# Patient Record
Sex: FEMALE | Race: WHITE | NOT HISPANIC OR LATINO | Employment: FULL TIME | URBAN - METROPOLITAN AREA
[De-identification: names, ages, dates, MRNs, and addresses within clinical notes are randomized per-mention and may not be internally consistent; named-entity substitution may affect disease eponyms.]

---

## 2020-01-23 ENCOUNTER — HOSPITAL ENCOUNTER (OUTPATIENT)
Dept: RADIOLOGY | Facility: HOSPITAL | Age: 30
Discharge: HOME | End: 2020-01-23
Attending: NURSE PRACTITIONER
Payer: MEDICAID

## 2020-01-23 DIAGNOSIS — N64.4 BREAST PAIN: ICD-10-CM

## 2020-01-23 PROCEDURE — 76642 ULTRASOUND BREAST LIMITED: CPT | Mod: 26,LT,, | Performed by: RADIOLOGY

## 2020-01-23 PROCEDURE — 76642 US BREAST LEFT LIMITED: ICD-10-PCS | Mod: 26,LT,, | Performed by: RADIOLOGY

## 2020-01-23 PROCEDURE — 76642 ULTRASOUND BREAST LIMITED: CPT | Mod: TC,LT

## 2021-02-03 ENCOUNTER — LAB VISIT (OUTPATIENT)
Dept: LAB | Facility: HOSPITAL | Age: 31
End: 2021-02-03
Attending: ADVANCED PRACTICE MIDWIFE
Payer: MEDICAID

## 2021-02-03 ENCOUNTER — TELEPHONE (OUTPATIENT)
Dept: OBSTETRICS AND GYNECOLOGY | Facility: CLINIC | Age: 31
End: 2021-02-03

## 2021-02-03 ENCOUNTER — OFFICE VISIT (OUTPATIENT)
Dept: OBSTETRICS AND GYNECOLOGY | Facility: CLINIC | Age: 31
End: 2021-02-03
Payer: MEDICAID

## 2021-02-03 ENCOUNTER — PROCEDURE VISIT (OUTPATIENT)
Dept: OBSTETRICS AND GYNECOLOGY | Facility: CLINIC | Age: 31
End: 2021-02-03
Payer: MEDICAID

## 2021-02-03 VITALS
BODY MASS INDEX: 20.3 KG/M2 | WEIGHT: 107.5 LBS | DIASTOLIC BLOOD PRESSURE: 83 MMHG | SYSTOLIC BLOOD PRESSURE: 128 MMHG | HEIGHT: 61 IN

## 2021-02-03 DIAGNOSIS — Z32.01 PREGNANCY EXAMINATION OR TEST, POSITIVE RESULT: Primary | ICD-10-CM

## 2021-02-03 DIAGNOSIS — O36.80X0 ENCOUNTER TO DETERMINE FETAL VIABILITY OF PREGNANCY, SINGLE OR UNSPECIFIED FETUS: ICD-10-CM

## 2021-02-03 DIAGNOSIS — Z32.01 PREGNANCY EXAMINATION OR TEST, POSITIVE RESULT: ICD-10-CM

## 2021-02-03 PROCEDURE — 80307 DRUG TEST PRSMV CHEM ANLYZR: CPT

## 2021-02-03 PROCEDURE — 99204 PR OFFICE/OUTPT VISIT, NEW, LEVL IV, 45-59 MIN: ICD-10-PCS | Mod: S$PBB,TH,, | Performed by: ADVANCED PRACTICE MIDWIFE

## 2021-02-03 PROCEDURE — 99999 PR PBB SHADOW E&M-EST. PATIENT-LVL II: CPT | Mod: PBBFAC,,, | Performed by: ADVANCED PRACTICE MIDWIFE

## 2021-02-03 PROCEDURE — 99204 OFFICE O/P NEW MOD 45 MIN: CPT | Mod: S$PBB,TH,, | Performed by: ADVANCED PRACTICE MIDWIFE

## 2021-02-03 PROCEDURE — 87086 URINE CULTURE/COLONY COUNT: CPT

## 2021-02-03 PROCEDURE — 87591 N.GONORRHOEAE DNA AMP PROB: CPT

## 2021-02-03 PROCEDURE — 76801 PR US, OB <14WKS, TRANSABD, SINGLE GESTATION: ICD-10-PCS | Mod: 26,S$PBB,, | Performed by: OBSTETRICS & GYNECOLOGY

## 2021-02-03 PROCEDURE — 87491 CHLMYD TRACH DNA AMP PROBE: CPT

## 2021-02-03 PROCEDURE — 76801 OB US < 14 WKS SINGLE FETUS: CPT | Mod: PBBFAC,PN | Performed by: OBSTETRICS & GYNECOLOGY

## 2021-02-03 PROCEDURE — 99212 OFFICE O/P EST SF 10 MIN: CPT | Mod: PBBFAC,25,TH,PN | Performed by: ADVANCED PRACTICE MIDWIFE

## 2021-02-03 PROCEDURE — 76801 OB US < 14 WKS SINGLE FETUS: CPT | Mod: 26,S$PBB,, | Performed by: OBSTETRICS & GYNECOLOGY

## 2021-02-03 PROCEDURE — 99999 PR PBB SHADOW E&M-EST. PATIENT-LVL II: ICD-10-PCS | Mod: PBBFAC,,, | Performed by: ADVANCED PRACTICE MIDWIFE

## 2021-02-04 ENCOUNTER — TELEPHONE (OUTPATIENT)
Dept: OBSTETRICS AND GYNECOLOGY | Facility: CLINIC | Age: 31
End: 2021-02-04

## 2021-02-04 DIAGNOSIS — Z32.01 PREGNANCY EXAMINATION OR TEST, POSITIVE RESULT: Primary | ICD-10-CM

## 2021-02-04 LAB
AMPHET+METHAMPHET UR QL: NORMAL
BARBITURATES UR QL SCN>200 NG/ML: NEGATIVE
BENZODIAZ UR QL SCN>200 NG/ML: NEGATIVE
BZE UR QL SCN: NORMAL
CANNABINOIDS UR QL SCN: NEGATIVE
CREAT UR-MCNC: 112 MG/DL (ref 15–325)
METHADONE UR QL SCN>300 NG/ML: NEGATIVE
OPIATES UR QL SCN: NORMAL
PCP UR QL SCN>25 NG/ML: NEGATIVE
TOXICOLOGY INFORMATION: NORMAL

## 2021-02-05 LAB
BACTERIA UR CULT: NORMAL
C TRACH DNA SPEC QL NAA+PROBE: NOT DETECTED
N GONORRHOEA DNA SPEC QL NAA+PROBE: NOT DETECTED

## 2021-02-11 PROBLEM — O36.80X0 ENCOUNTER TO DETERMINE FETAL VIABILITY OF PREGNANCY: Status: ACTIVE | Noted: 2021-02-11

## 2022-02-10 ENCOUNTER — TELEPHONE (OUTPATIENT)
Dept: OBSTETRICS AND GYNECOLOGY | Facility: CLINIC | Age: 32
End: 2022-02-10
Payer: MEDICAID

## 2022-02-10 NOTE — TELEPHONE ENCOUNTER
----- Message from Saskia Lazaro sent at 2/10/2022  1:49 PM CST -----  Contact: Nomra  Type:  Sooner Apoointment Request    Caller is requesting a sooner appointment.  Caller declined first available appointment listed below.  Caller will not accept being placed on the waitlist and is requesting a message be sent to doctor.  Name of Caller: Norma  When is the first available appointment?4/2022  Symptoms:WWE, Pap, Hormone consult for fertility  Would the patient rather a call back or a response via [facility]? Call back  Best Call Back Number: [phone]  Additional Information: Soonest in [place]

## 2022-02-10 NOTE — TELEPHONE ENCOUNTER
Contacted pt to schedule an appointment. Pt confirmed appointment date/time/location and voiced understanding.

## 2022-02-23 ENCOUNTER — LAB VISIT (OUTPATIENT)
Dept: LAB | Facility: HOSPITAL | Age: 32
End: 2022-02-23
Attending: OBSTETRICS & GYNECOLOGY
Payer: MEDICAID

## 2022-02-23 ENCOUNTER — OFFICE VISIT (OUTPATIENT)
Dept: OBSTETRICS AND GYNECOLOGY | Facility: CLINIC | Age: 32
End: 2022-02-23
Payer: MEDICAID

## 2022-02-23 VITALS — WEIGHT: 104.25 LBS | BODY MASS INDEX: 19.7 KG/M2

## 2022-02-23 DIAGNOSIS — Z11.3 SCREEN FOR STD (SEXUALLY TRANSMITTED DISEASE): ICD-10-CM

## 2022-02-23 DIAGNOSIS — Z12.4 PAP SMEAR FOR CERVICAL CANCER SCREENING: Primary | ICD-10-CM

## 2022-02-23 DIAGNOSIS — Z01.419 ENCOUNTER FOR GYNECOLOGICAL EXAMINATION WITHOUT ABNORMAL FINDING: ICD-10-CM

## 2022-02-23 PROBLEM — Z32.01 PREGNANCY EXAMINATION OR TEST, POSITIVE RESULT: Status: RESOLVED | Noted: 2021-02-03 | Resolved: 2022-02-23

## 2022-02-23 PROBLEM — O36.80X0 ENCOUNTER TO DETERMINE FETAL VIABILITY OF PREGNANCY: Status: RESOLVED | Noted: 2021-02-11 | Resolved: 2022-02-23

## 2022-02-23 PROCEDURE — 87491 CHLMYD TRACH DNA AMP PROBE: CPT | Performed by: OBSTETRICS & GYNECOLOGY

## 2022-02-23 PROCEDURE — 87624 HPV HI-RISK TYP POOLED RSLT: CPT | Performed by: OBSTETRICS & GYNECOLOGY

## 2022-02-23 PROCEDURE — 88141 PR  CYTOPATH CERV/VAG INTERPRET: ICD-10-PCS | Mod: ,,, | Performed by: PATHOLOGY

## 2022-02-23 PROCEDURE — 1159F PR MEDICATION LIST DOCUMENTED IN MEDICAL RECORD: ICD-10-PCS | Mod: CPTII,,, | Performed by: OBSTETRICS & GYNECOLOGY

## 2022-02-23 PROCEDURE — 99999 PR PBB SHADOW E&M-EST. PATIENT-LVL II: CPT | Mod: PBBFAC,,, | Performed by: OBSTETRICS & GYNECOLOGY

## 2022-02-23 PROCEDURE — 86592 SYPHILIS TEST NON-TREP QUAL: CPT | Performed by: OBSTETRICS & GYNECOLOGY

## 2022-02-23 PROCEDURE — 80074 ACUTE HEPATITIS PANEL: CPT | Performed by: OBSTETRICS & GYNECOLOGY

## 2022-02-23 PROCEDURE — 88141 CYTOPATH C/V INTERPRET: CPT | Mod: ,,, | Performed by: PATHOLOGY

## 2022-02-23 PROCEDURE — 99212 OFFICE O/P EST SF 10 MIN: CPT | Mod: PBBFAC | Performed by: OBSTETRICS & GYNECOLOGY

## 2022-02-23 PROCEDURE — 99395 PREV VISIT EST AGE 18-39: CPT | Mod: S$PBB,,, | Performed by: OBSTETRICS & GYNECOLOGY

## 2022-02-23 PROCEDURE — 87389 HIV-1 AG W/HIV-1&-2 AB AG IA: CPT | Performed by: OBSTETRICS & GYNECOLOGY

## 2022-02-23 PROCEDURE — 88142 CYTOPATH C/V THIN LAYER: CPT | Performed by: PATHOLOGY

## 2022-02-23 PROCEDURE — 99395 PR PREVENTIVE VISIT,EST,18-39: ICD-10-PCS | Mod: S$PBB,,, | Performed by: OBSTETRICS & GYNECOLOGY

## 2022-02-23 PROCEDURE — 99999 PR PBB SHADOW E&M-EST. PATIENT-LVL II: ICD-10-PCS | Mod: PBBFAC,,, | Performed by: OBSTETRICS & GYNECOLOGY

## 2022-02-23 PROCEDURE — 3008F BODY MASS INDEX DOCD: CPT | Mod: CPTII,,, | Performed by: OBSTETRICS & GYNECOLOGY

## 2022-02-23 PROCEDURE — 87591 N.GONORRHOEAE DNA AMP PROB: CPT | Performed by: OBSTETRICS & GYNECOLOGY

## 2022-02-23 PROCEDURE — 1159F MED LIST DOCD IN RCRD: CPT | Mod: CPTII,,, | Performed by: OBSTETRICS & GYNECOLOGY

## 2022-02-23 PROCEDURE — 3008F PR BODY MASS INDEX (BMI) DOCUMENTED: ICD-10-PCS | Mod: CPTII,,, | Performed by: OBSTETRICS & GYNECOLOGY

## 2022-02-23 PROCEDURE — 36415 COLL VENOUS BLD VENIPUNCTURE: CPT | Performed by: OBSTETRICS & GYNECOLOGY

## 2022-02-23 RX ORDER — ARIPIPRAZOLE 2 MG/1
TABLET ORAL EVERY MORNING
COMMUNITY
Start: 2022-01-03 | End: 2023-01-11

## 2022-02-23 RX ORDER — BUPRENORPHINE HYDROCHLORIDE, NALOXONE HYDROCHLORIDE 8; 2 MG/1; MG/1
FILM, SOLUBLE BUCCAL; SUBLINGUAL 2 TIMES DAILY
COMMUNITY
Start: 2021-11-15 | End: 2022-03-30

## 2022-02-23 RX ORDER — QUETIAPINE FUMARATE 50 MG/1
TABLET, FILM COATED ORAL NIGHTLY
COMMUNITY
Start: 2022-01-03 | End: 2023-01-11

## 2022-02-23 RX ORDER — QUETIAPINE 150 MG/1
150 TABLET, FILM COATED, EXTENDED RELEASE ORAL NIGHTLY
COMMUNITY
Start: 2021-09-08 | End: 2022-02-23

## 2022-02-23 NOTE — PROGRESS NOTES
Subjective:       Patient ID: Norma De La Garza is a 31 y.o. female.    Chief Complaint:  Gynecologic Exam      History of Present Illness  HPI  The patient presents for exam with no complaints, regular menses, no gyn issues  Contraceptive measures are addressed.   Not interested in BC at this time   Reviewed OB and medical history with patient   Significant history of heart disease, Hep C with activity unknown, previous Hep B, Addiction history, Has ABO antibody titer positive   On multiple medications for mood, Positive Cig smoker   Advised that clearance of medical issues and cease smoking should be done prior to any pregnancy   Preventive testing reviewed and discussed.  Pap needed  STD screening       Health Maintenance   Topic Date Due    Hepatitis C Screening  Never done    Lipid Panel  Never done    TETANUS VACCINE  Never done     GYN & OB History  Patient's last menstrual period was 2022 (approximate).   Date of Last Pap: 2022    OB History    Para Term  AB Living   12 2 2   10 3   SAB IAB Ectopic Multiple Live Births   10     1 3      # Outcome Date GA Lbr Husyein/2nd Weight Sex Delivery Anes PTL Lv   12 SAB            11 SAB            10 SAB            9 SAB            8 SAB            7 SAB            6 SAB            5 SAB            4 SAB            3 SAB            2 Term     F CS-LTranv   ELTON   1A Term     F CS-LTranv   ELTON   1B Term     M CS-LTranv  N ELTON       Review of Systems  Review of Systems        Objective:   Wt 47.3 kg (104 lb 4.4 oz)   LMP 2022 (Approximate)   BMI 19.70 kg/m²    Physical Exam:   Constitutional: She appears well-developed and well-nourished. No distress.      Neck: No JVD present. No thyroid mass and no thyromegaly present.    Cardiovascular: Normal rate and regular rhythm.          Abdominal: Soft. Bowel sounds are normal. No hernia. Hernia confirmed negative in the ventral area, confirmed negative in the right inguinal area and  confirmed negative in the left inguinal area.     Genitourinary:    Vagina, uterus and rectum normal.   Labial bartholins normal.There is no rash, tenderness, lesion or injury on the right labia. There is no rash, tenderness, lesion or injury on the left labia. Cervix is normal. Right adnexum displays no mass, no tenderness and no fullness. Left adnexum displays no mass, no tenderness and no fullness. No erythema,  no vaginal discharge, tenderness or bleeding in the vagina.    No foreign body in the vagina.   Cervix exhibits no motion tenderness, no discharge and no friability. Additional cervical findings: pap smear abnormal Uterus is not deviated, not enlarged, not fixed and not tender.                 Cervical DNA screen done      Assessment:        1. Pap smear for cervical cancer screening    2. Screen for STD (sexually transmitted disease)    3. Encounter for gynecological examination without abnormal finding                Plan:            Norma was seen today for gynecologic exam.    Diagnoses and all orders for this visit:    Pap smear for cervical cancer screening  -     Liquid-Based Pap Smear, Screening  -     HPV High Risk Genotypes, PCR    Screen for STD (sexually transmitted disease)  -     C. trachomatis/N. gonorrhoeae by AMP DNA  -     HIV 1/2 Ag/Ab (4th Gen); Future  -     RPR; Future  -     Hepatitis Panel, Acute; Future    Encounter for gynecological examination without abnormal finding      Discussed Hep C treatment at [facility] and state of [place] if positive

## 2022-02-24 LAB — RPR SER QL: NORMAL

## 2022-02-25 LAB
C TRACH DNA SPEC QL NAA+PROBE: NOT DETECTED
HAV IGM SERPL QL IA: NEGATIVE
HBV CORE IGM SERPL QL IA: NEGATIVE
HBV SURFACE AG SERPL QL IA: NEGATIVE
HCV AB SERPL QL IA: POSITIVE
HIV 1+2 AB+HIV1 P24 AG SERPL QL IA: NEGATIVE
N GONORRHOEA DNA SPEC QL NAA+PROBE: NOT DETECTED

## 2022-02-27 PROBLEM — B19.20 HEPATITIS C VIRUS INFECTION WITHOUT HEPATIC COMA: Status: ACTIVE | Noted: 2022-02-27

## 2022-03-01 ENCOUNTER — TELEPHONE (OUTPATIENT)
Dept: OBSTETRICS AND GYNECOLOGY | Facility: CLINIC | Age: 32
End: 2022-03-01
Payer: MEDICAID

## 2022-03-01 LAB
HPV HR 12 DNA SPEC QL NAA+PROBE: NEGATIVE
HPV16 AG SPEC QL: POSITIVE
HPV18 DNA SPEC QL NAA+PROBE: NEGATIVE

## 2022-03-01 NOTE — TELEPHONE ENCOUNTER
DESTINY Mariee MD   2/28/2022  9:14 AM CST         Inform patient that her Hepatitis C antibody remains positive   She really needs to move forward with [facility] and the state for treatment      Called patient and after verifying with 2 identifiers the above results discussed.  Patient verbalized understanding.

## 2022-03-01 NOTE — TELEPHONE ENCOUNTER
----- Message from Judith Pierre sent at 3/1/2022  3:52 PM CST -----  Contact: pt  Type:  Patient Returning Call    Who Called: pt  Who Left Message for Patient: unknown   Does the patient know what this is regarding?: no  Would the patient rather a call back or a response via [facility]? Call back  Best Call Back Number: [phone]  Additional Information: n/a

## 2022-03-07 LAB
FINAL PATHOLOGIC DIAGNOSIS: ABNORMAL
Lab: ABNORMAL

## 2022-03-15 ENCOUNTER — TELEPHONE (OUTPATIENT)
Dept: OBSTETRICS AND GYNECOLOGY | Facility: CLINIC | Age: 32
End: 2022-03-15
Payer: MEDICAID

## 2022-03-15 NOTE — TELEPHONE ENCOUNTER
Called pt to notify that Dr. Mariee will not be in clinic on the day of her appt and to help reschedule. No answer. LVM.

## 2022-03-30 ENCOUNTER — PROCEDURE VISIT (OUTPATIENT)
Dept: OBSTETRICS AND GYNECOLOGY | Facility: CLINIC | Age: 32
End: 2022-03-30
Payer: MEDICAID

## 2022-03-30 VITALS
SYSTOLIC BLOOD PRESSURE: 120 MMHG | WEIGHT: 109.81 LBS | BODY MASS INDEX: 20.74 KG/M2 | DIASTOLIC BLOOD PRESSURE: 60 MMHG

## 2022-03-30 DIAGNOSIS — R87.611 ATYPICAL SQUAMOUS CELLS CANNOT EXCLUDE HIGH GRADE SQUAMOUS INTRAEPITHELIAL LESION ON CYTOLOGIC SMEAR OF CERVIX (ASC-H): Primary | ICD-10-CM

## 2022-03-30 PROCEDURE — 57454 BX/CURETT OF CERVIX W/SCOPE: CPT | Mod: S$PBB,,, | Performed by: NURSE PRACTITIONER

## 2022-03-30 PROCEDURE — 88305 TISSUE EXAM BY PATHOLOGIST: CPT | Mod: 59 | Performed by: PATHOLOGY

## 2022-03-30 PROCEDURE — 81025 URINE PREGNANCY TEST: CPT | Mod: PBBFAC | Performed by: NURSE PRACTITIONER

## 2022-03-30 PROCEDURE — 57454 BX/CURETT OF CERVIX W/SCOPE: CPT | Mod: PBBFAC | Performed by: NURSE PRACTITIONER

## 2022-03-30 PROCEDURE — 57454 COLPOSCOPY: ICD-10-PCS | Mod: S$PBB,,, | Performed by: NURSE PRACTITIONER

## 2022-03-30 RX ORDER — IBUPROFEN 200 MG
1 TABLET ORAL DAILY
COMMUNITY
Start: 2022-03-08 | End: 2023-01-11

## 2022-03-30 NOTE — LETTER
March 30, 2022    Norma De La Garza  [address] [place] [address]         [facility]  [address] [facility] [facility] [address]  Phone: [phone]  Fax: [phone] March 30, 2022     Patient: Norma De La Garza   YOB: 1990   Date of Visit: 3/30/2022       To Whom It May Concern:    It is my medical opinion that Norma De La Garza be put on pelvic rest for the next 7 days. No intercourse for the next 7 days as well as nothing inside of the vagina including a tampon.  If you have any questions or concerns, please don't hesitate to call.    Sincerely,        Swathi Kaur NP/Shruti Tse

## 2022-03-31 PROCEDURE — 88305 TISSUE EXAM BY PATHOLOGIST: CPT | Mod: 26,,, | Performed by: PATHOLOGY

## 2022-03-31 PROCEDURE — 88305 TISSUE EXAM BY PATHOLOGIST: ICD-10-PCS | Mod: 26,,, | Performed by: PATHOLOGY

## 2022-03-31 NOTE — PROCEDURES
Colposcopy    Date/Time: 3/30/2022 3:15 PM  Performed by: Swathi Kaur NP  Authorized by: Swathi Kaur NP     Consent Done?:  Yes (Verbal) and Yes (Written)  Prep:Patient was prepped and draped in the usual sterile fashion  Assistants?: Yes    List of assistants:  Shruti nation I was present for the entire procedure.    Colposcopy Site:  Cervix  Position:  Supine   Patient was prepped and draped in the normal sterile fashion.  Acrowhite Lesion? Yes    Atypical Vessels: No    Transformation Zone Adequate?: Yes    Biopsy?: Yes         Location:  Cervix ((9 00, 3 00, 6 00 and 12 00))  ECC Performed?: Yes    LEEP Performed?: No    Estimated blood loss (cc):  10   Patient tolerated the procedure well with no immediate complications.   Post-operative instructions were provided for the patient.   Patient was discharged and will follow up if any complications occur     PAP SMEAR:  ASC-H HPV POS   Impression CIN2

## 2022-04-01 ENCOUNTER — NURSE TRIAGE (OUTPATIENT)
Dept: ADMINISTRATIVE | Facility: CLINIC | Age: 32
End: 2022-04-01
Payer: MEDICAID

## 2022-04-01 NOTE — TELEPHONE ENCOUNTER
Biopsy done yesterday. Standing up a lot today. Bleeding a lot and having a lot of pain. Rates abominal pain as 8/10. She went to work and states her work requires lifting an straining and she will need a work excuse as she cannot lift and strain after her procedure as its causing her pain and bleeding. Recommended ED now for severe abdominal pain. Advised triage nurse would send request to doctor's office requesting excuse for a period of time doctor thinks is reasonable given her recent biopsy. Please contact caller with any further care advice.     Reason for Disposition   SEVERE abdominal pain    Additional Information   Negative: Shock suspected (e.g., cold/pale/clammy skin, too weak to stand, low BP, rapid pulse)   Negative: Difficult to awaken or acting confused  (e.g., disoriented, slurred speech)   Negative: Passed out (i.e., lost consciousness, collapsed and was not responding)   Negative: Sounds like a life-threatening emergency to the triager   Negative: Followed a genital area injury   Negative: Pregnant > 20 weeks  (5 months or more)   Negative: Pregnant < 20 weeks  (less than 5 months)   Negative: Bleeding occurring > 12 months after menopause   Negative: Bleeding from sexual abuse or rape   Negative: [1] Vaginal discharge is main symptom AND [2] small amount of blood    Protocols used: ST VAGINAL BLEEDING - [id number]

## 2022-04-06 ENCOUNTER — TELEPHONE (OUTPATIENT)
Dept: OBSTETRICS AND GYNECOLOGY | Facility: CLINIC | Age: 32
End: 2022-04-06
Payer: MEDICAID

## 2022-04-06 ENCOUNTER — PATIENT MESSAGE (OUTPATIENT)
Dept: OBSTETRICS AND GYNECOLOGY | Facility: CLINIC | Age: 32
End: 2022-04-06
Payer: MEDICAID

## 2022-04-06 LAB
FINAL PATHOLOGIC DIAGNOSIS: NORMAL
GROSS: NORMAL
Lab: NORMAL

## 2022-04-06 NOTE — TELEPHONE ENCOUNTER
Notified pt her pathology is still in process. Pt verbalized understanding.   
PAST SURGICAL HISTORY:  No significant past surgical history

## 2022-04-11 ENCOUNTER — TELEPHONE (OUTPATIENT)
Dept: OBSTETRICS AND GYNECOLOGY | Facility: CLINIC | Age: 32
End: 2022-04-11
Payer: MEDICAID

## 2022-04-11 DIAGNOSIS — D06.9 SEVERE DYSPLASIA OF CERVIX: Primary | ICD-10-CM

## 2022-04-11 NOTE — TELEPHONE ENCOUNTER
Pt called to try and schedule her surgery. Pt stated that she would like to be put under anesthesia for her excisional procedure. Would you like pt scheduled with you for a consult before surgery? Or would you like for me to send a message to Tamica to schedule the surgery? Please advise.

## 2022-04-11 NOTE — TELEPHONE ENCOUNTER
----- Message from Coby Mckenna sent at 4/11/2022  1:30 PM CDT -----  Pt is returning nurse call. Pt can be reached at [phone] (cvsj)

## 2022-05-02 NOTE — PRE ADMISSION SCREENING
Pre op instructions reviewed with pt per phone: Spoke about pre op process and surgery instructions, verbalized understanding.    Surgery is scheduled on 5/6/22. Please arrive at 1020am. We will call you the afternoon prior to surgery to confirm arrival time, as it is subject to change due to cancellations & emergencies.    Please report to the [facility] (1st Floor) at [facility] located off of [facility] (2nd building on the left, in front of the [facility] pole).  Address: [address] [place] [address] [place]. [address]    Your Covid Test appointment is scheduled on day of surgery.      INSTRUCTIONS IMPORTANT!!!  Do Not Eat, Drink, or Smoke after 12 midnight! NO WATER after midnight! OK to brush teeth, no gum, candy or mints!      *Take only these medicines with a small swallow of water-morning of surgery.  abilify     ____  NO Acrylic/fake nails or nail polish worn day of surgery (specifically hand/arm & foot surgeries).  ____  NO powder, lotions, deodorants, oils or creams on body.  ____  Please Remove All jewelry & piercings prior to surgery.  ____  Please Remove Dentures, Hearing Aids & Contact Lens prior to the start of surgery.  ____  Please bring photo ID and insurance information to hospital (Leave Valuables at Home).  ____  If going home the same day, arrange for a ride home. You will not be able to drive 24 hrs if Anesthesia was used.   ____  Females (ages 11-60) may need to give a urine sample the morning of surgery; please see Pre op Nurse prior to voiding.  ____  Wear clean, loose fitting clothing. Allow for dressings, bandages.  ____  Stop all Aspirin products, Ibuprofen, Advil, Motrin & Aleve at least 5-7 days before surgery, unless otherwise instructed by your doctor, or the nurse.   ____  Blood Thinners are stopped based on your Provider's recommendation; Call Surgeon's Office to inquire when to stop/hold.  ____  Stop taking any Fish Oil supplements or Vitamins at least 5 days prior to surgery, unless  instructed otherwise by your Doctor.            Diabetic Patients: If you take diabetic medication, do NOT take morning of surgery unless instructed by             Doctor. Metformin to be stopped 24 hrs prior to surgery time. DO NOT take long-acting insulin the evening before surgery. Blood sugars will be checked in pre-op morning of.    Bathing Instructions:    -Do not shave your face or body the day before or the day of surgery.  -Do not shave pubic hair 7 days prior to surgery (gyn pt's).   -Shower & Rinse your body as usual with anti-bacterial Soap (Dial, Lever 2000, or Hibiclens)   -Do not use Hibiclens on your head, face, or genitals.   -Do not wash with anti-bacterial soap after you use the Hibiclens.   -Rinse your body thoroughly.      [facility] Visitor/Ride Policy:  Only 2 adults allowed (over the age of 18) to accompany you to the Hospital. You Must have a ride home from a responsible adult that you know and trust. Medical Transport, Uber or Lyft can only be used if patient has a responsible adult to accompany them during ride home.    Post-Op Instructions: You will receive Post-op/Discharge instructions by your Discharge Nurse prior to going home. Please call your Surgeon's office with any post-surgery questions/concerns.    *Call [facility] Pre-Admissions Department with surgery instruction questions @ [phone] or [phone] (Mon-Fri 7:30a to 3:45p)  *If you are running late or have questions the morning of surgery, please call the Surgery Dept @ [phone]  *Insurance/ Financial Questions, please call [phone].

## 2022-05-04 ENCOUNTER — OFFICE VISIT (OUTPATIENT)
Dept: OBSTETRICS AND GYNECOLOGY | Facility: CLINIC | Age: 32
End: 2022-05-04
Payer: MEDICAID

## 2022-05-04 ENCOUNTER — LAB VISIT (OUTPATIENT)
Dept: LAB | Facility: HOSPITAL | Age: 32
End: 2022-05-04
Attending: OBSTETRICS & GYNECOLOGY
Payer: MEDICAID

## 2022-05-04 VITALS
SYSTOLIC BLOOD PRESSURE: 100 MMHG | HEIGHT: 64 IN | WEIGHT: 108 LBS | DIASTOLIC BLOOD PRESSURE: 68 MMHG | BODY MASS INDEX: 18.44 KG/M2

## 2022-05-04 DIAGNOSIS — D06.9 HIGH GRADE SQUAMOUS INTRAEPITHELIAL LESION (HGSIL), GRADE 3 CIN, ON BIOPSY OF CERVIX: Primary | ICD-10-CM

## 2022-05-04 DIAGNOSIS — D06.9 SEVERE DYSPLASIA OF CERVIX: ICD-10-CM

## 2022-05-04 LAB
ANION GAP SERPL CALC-SCNC: 9 MMOL/L (ref 8–16)
BASOPHILS # BLD AUTO: 0.05 K/UL (ref 0–0.2)
BASOPHILS NFR BLD: 0.9 % (ref 0–1.9)
BUN SERPL-MCNC: 10 MG/DL (ref 6–20)
CALCIUM SERPL-MCNC: 9.1 MG/DL (ref 8.7–10.5)
CHLORIDE SERPL-SCNC: 106 MMOL/L (ref 95–110)
CO2 SERPL-SCNC: 23 MMOL/L (ref 23–29)
CREAT SERPL-MCNC: 0.6 MG/DL (ref 0.5–1.4)
DIFFERENTIAL METHOD: ABNORMAL
EOSINOPHIL # BLD AUTO: 0.2 K/UL (ref 0–0.5)
EOSINOPHIL NFR BLD: 2.9 % (ref 0–8)
ERYTHROCYTE [DISTWIDTH] IN BLOOD BY AUTOMATED COUNT: 13.7 % (ref 11.5–14.5)
EST. GFR  (AFRICAN AMERICAN): >60 ML/MIN/1.73 M^2
EST. GFR  (NON AFRICAN AMERICAN): >60 ML/MIN/1.73 M^2
GLUCOSE SERPL-MCNC: 102 MG/DL (ref 70–110)
HCG INTACT+B SERPL-ACNC: <2.4 MIU/ML
HCT VFR BLD AUTO: 44.4 % (ref 37–48.5)
HGB BLD-MCNC: 14.1 G/DL (ref 12–16)
IMM GRANULOCYTES # BLD AUTO: 0.03 K/UL (ref 0–0.04)
IMM GRANULOCYTES NFR BLD AUTO: 0.5 % (ref 0–0.5)
LYMPHOCYTES # BLD AUTO: 2 K/UL (ref 1–4.8)
LYMPHOCYTES NFR BLD: 34.6 % (ref 18–48)
MCH RBC QN AUTO: 28.1 PG (ref 27–31)
MCHC RBC AUTO-ENTMCNC: 31.8 G/DL (ref 32–36)
MCV RBC AUTO: 88 FL (ref 82–98)
MONOCYTES # BLD AUTO: 0.5 K/UL (ref 0.3–1)
MONOCYTES NFR BLD: 9.3 % (ref 4–15)
NEUTROPHILS # BLD AUTO: 3 K/UL (ref 1.8–7.7)
NEUTROPHILS NFR BLD: 51.8 % (ref 38–73)
NRBC BLD-RTO: 0 /100 WBC
PLATELET # BLD AUTO: 203 K/UL (ref 150–450)
PMV BLD AUTO: 11.8 FL (ref 9.2–12.9)
POTASSIUM SERPL-SCNC: 4.5 MMOL/L (ref 3.5–5.1)
RBC # BLD AUTO: 5.02 M/UL (ref 4–5.4)
SODIUM SERPL-SCNC: 138 MMOL/L (ref 136–145)
WBC # BLD AUTO: 5.83 K/UL (ref 3.9–12.7)

## 2022-05-04 PROCEDURE — 99213 OFFICE O/P EST LOW 20 MIN: CPT | Mod: PBBFAC | Performed by: OBSTETRICS & GYNECOLOGY

## 2022-05-04 PROCEDURE — 3078F PR MOST RECENT DIASTOLIC BLOOD PRESSURE < 80 MM HG: ICD-10-PCS | Mod: CPTII,,, | Performed by: OBSTETRICS & GYNECOLOGY

## 2022-05-04 PROCEDURE — 99999 PR PBB SHADOW E&M-EST. PATIENT-LVL III: CPT | Mod: PBBFAC,,, | Performed by: OBSTETRICS & GYNECOLOGY

## 2022-05-04 PROCEDURE — 36415 COLL VENOUS BLD VENIPUNCTURE: CPT | Performed by: OBSTETRICS & GYNECOLOGY

## 2022-05-04 PROCEDURE — 1160F PR REVIEW ALL MEDS BY PRESCRIBER/CLIN PHARMACIST DOCUMENTED: ICD-10-PCS | Mod: CPTII,,, | Performed by: OBSTETRICS & GYNECOLOGY

## 2022-05-04 PROCEDURE — 80048 BASIC METABOLIC PNL TOTAL CA: CPT | Performed by: OBSTETRICS & GYNECOLOGY

## 2022-05-04 PROCEDURE — 99999 PR PBB SHADOW E&M-EST. PATIENT-LVL III: ICD-10-PCS | Mod: PBBFAC,,, | Performed by: OBSTETRICS & GYNECOLOGY

## 2022-05-04 PROCEDURE — 1160F RVW MEDS BY RX/DR IN RCRD: CPT | Mod: CPTII,,, | Performed by: OBSTETRICS & GYNECOLOGY

## 2022-05-04 PROCEDURE — 3074F SYST BP LT 130 MM HG: CPT | Mod: CPTII,,, | Performed by: OBSTETRICS & GYNECOLOGY

## 2022-05-04 PROCEDURE — 1159F PR MEDICATION LIST DOCUMENTED IN MEDICAL RECORD: ICD-10-PCS | Mod: CPTII,,, | Performed by: OBSTETRICS & GYNECOLOGY

## 2022-05-04 PROCEDURE — 84702 CHORIONIC GONADOTROPIN TEST: CPT | Performed by: OBSTETRICS & GYNECOLOGY

## 2022-05-04 PROCEDURE — 3008F PR BODY MASS INDEX (BMI) DOCUMENTED: ICD-10-PCS | Mod: CPTII,,, | Performed by: OBSTETRICS & GYNECOLOGY

## 2022-05-04 PROCEDURE — 1159F MED LIST DOCD IN RCRD: CPT | Mod: CPTII,,, | Performed by: OBSTETRICS & GYNECOLOGY

## 2022-05-04 PROCEDURE — 3008F BODY MASS INDEX DOCD: CPT | Mod: CPTII,,, | Performed by: OBSTETRICS & GYNECOLOGY

## 2022-05-04 PROCEDURE — 85025 COMPLETE CBC W/AUTO DIFF WBC: CPT | Performed by: OBSTETRICS & GYNECOLOGY

## 2022-05-04 PROCEDURE — 99499 NO LOS: ICD-10-PCS | Mod: S$PBB,,, | Performed by: OBSTETRICS & GYNECOLOGY

## 2022-05-04 PROCEDURE — 3078F DIAST BP <80 MM HG: CPT | Mod: CPTII,,, | Performed by: OBSTETRICS & GYNECOLOGY

## 2022-05-04 PROCEDURE — 99499 UNLISTED E&M SERVICE: CPT | Mod: S$PBB,,, | Performed by: OBSTETRICS & GYNECOLOGY

## 2022-05-04 PROCEDURE — 3074F PR MOST RECENT SYSTOLIC BLOOD PRESSURE < 130 MM HG: ICD-10-PCS | Mod: CPTII,,, | Performed by: OBSTETRICS & GYNECOLOGY

## 2022-05-04 RX ORDER — DEXTROSE MONOHYDRATE AND SODIUM CHLORIDE 5; .9 G/100ML; G/100ML
INJECTION, SOLUTION INTRAVENOUS CONTINUOUS
Status: CANCELLED | OUTPATIENT
Start: 2022-05-04

## 2022-05-04 NOTE — PROGRESS NOTES
I have explained the risks, benefits , and alternatives of LEEP under anesthesia  in detail.  The patient voices understanding and all questions have been answered.  The patient agrees to proceed as planned.  Consent forms for the procedure have been reviewed and signed by the patient.

## 2022-05-04 NOTE — H&P
Subjective:       Patient ID: Norma De La Garza is a 31 y.o. female.    Chief Complaint:  Pre-op Exam  ERNST 3 on cervical biopsy     History of Present Illness  HPI  Recent abnormal pap with Colposcopy showing ERNST 3 on biopsy at 9:00 with negative ECC.  Patient requesting deep sedation for the LEEP     GYN & OB History  Patient's last menstrual period was 04/15/2022.   Date of Last Pap: 3/7/2022    OB History    Para Term  AB Living   12 2 2   10 3   SAB IAB Ectopic Multiple Live Births   10     1 3      # Outcome Date GA Lbr Huseyin/2nd Weight Sex Delivery Anes PTL Lv   12 SAB            11 SAB            10 SAB            9 SAB            8 SAB            7 SAB            6 SAB            5 SAB            4 SAB            3 SAB            2 Term     F CS-LTranv   ELTON   1A Term     F CS-LTranv   ELTON   1B Term     M CS-LTranv  N ELTON     Past Medical History:   Diagnosis Date    Deep vein thrombosis     Encounter for blood transfusion     Hepatitis B carrier     Hepatitis C      Past Surgical History:   Procedure Laterality Date     SECTION      DILATION AND CURETTAGE OF UTERUS      jaw reconstruction      TONSILLECTOMY       History reviewed. No pertinent family history.  Social History     Tobacco Use    Smoking status: Former Smoker     Packs/day: 1.00     Types: Cigarettes     Quit date: 2022     Years since quittin.0    Smokeless tobacco: Current User   Substance Use Topics    Alcohol use: No    Drug use: Yes     Comment: Heroine     (Not in a hospital admission)    Review of patient's allergies indicates:   Allergen Reactions    Penicillins     Meperidine Itching     Current Outpatient Medications   Medication Sig    ARIPiprazole (ABILIFY) 2 MG Tab every morning.    nicotine (NICODERM CQ) 21 mg/24 hr 1 patch once daily.    QUEtiapine (SEROQUEL) 50 MG tablet nightly.    rivaroxaban (XARELTO) 20 mg Tab Take 20 mg by mouth daily with dinner or evening meal.      No current facility-administered medications for this visit.     Review of Systems  Review of Systems   Constitutional: Negative for appetite change, chills, fatigue, fever and unexpected weight change.   HENT: Negative.    Eyes: Negative for visual disturbance.   Respiratory: Negative for shortness of breath and wheezing.    Cardiovascular: Negative for chest pain, palpitations and leg swelling.   Gastrointestinal: Negative for abdominal pain, bloating, blood in stool, constipation, diarrhea, nausea and vomiting.   Endocrine: Negative for hair loss, hot flashes and hypothyroidism.   Genitourinary: Negative for dysmenorrhea, dyspareunia, dysuria, flank pain, frequency, genital sores, hematuria, menorrhagia, menstrual problem, pelvic pain, urgency, vaginal bleeding, vaginal discharge, urinary incontinence and vaginal odor.   Musculoskeletal: Negative for back pain, joint swelling and myalgias.   Integumentary:  Negative for rash, hair changes, breast mass, nipple discharge and breast skin changes.   Neurological: Negative for syncope and headaches.   Hematological: Negative for adenopathy. Does not bruise/bleed easily.   Psychiatric/Behavioral: Negative for depression and sleep disturbance. The patient is not nervous/anxious.    Breast: Negative for mass, mastodynia, nipple discharge and skin changes          Objective:    Physical Exam:   Constitutional: She is oriented to person, place, and time. Vital signs are normal. She appears well-developed and well-nourished. No distress.    HENT:   Head: Normocephalic and atraumatic.   Right Ear: External ear normal.   Left Ear: External ear normal.   Nose: Nose normal.    Eyes: Pupils are equal, round, and reactive to light. Conjunctivae are normal.    Neck: Trachea normal. No JVD present. No thyroid mass and no thyromegaly present.    Cardiovascular: Normal rate and regular rhythm.     Pulmonary/Chest: Effort normal and breath sounds normal. No respiratory distress.         Abdominal: Soft. Bowel sounds are normal. She exhibits no distension and no mass. There is no abdominal tenderness. No hernia. Hernia confirmed negative in the ventral area, confirmed negative in the right inguinal area and confirmed negative in the left inguinal area.     Genitourinary:    Vagina, uterus and rectum normal.   Rectum:      No external hemorrhoid or abnormal anal tone.   Labial bartholins normal.There is no rash, tenderness or lesion on the right labia. There is no rash, tenderness or lesion on the left labia. Cervix is normal. Right adnexum displays no mass, no tenderness and no fullness. Left adnexum displays no mass, no tenderness and no fullness. No  no vaginal discharge, tenderness, bleeding, rectocele, cystocele or unspecified prolapse of vaginal walls in the vagina.    No foreign body in the vagina.   Cervix exhibits no motion tenderness, no discharge and no friability. Uterus is not deviated, not enlarged and not tender.           Musculoskeletal: Normal range of motion.       Neurological: She is alert and oriented to person, place, and time. She has normal reflexes.    Skin: Skin is warm and dry. She is not diaphoretic.    Psychiatric: She has a normal mood and affect. Her speech is normal and behavior is normal. Thought content normal.          Assessment:        1. High grade squamous intraepithelial lesion (HGSIL), grade 3 ERNST, on biopsy of cervix               Plan:      Norma was seen today for pre-op exam.    Diagnoses and all orders for this visit:    High grade squamous intraepithelial lesion (HGSIL), grade 3 ERNST, on biopsy of cervix  Comments:  LEEP under deep sedation

## 2022-05-05 ENCOUNTER — TELEPHONE (OUTPATIENT)
Dept: PREADMISSION TESTING | Facility: HOSPITAL | Age: 32
End: 2022-05-05
Payer: MEDICAID

## 2022-05-05 NOTE — TELEPHONE ENCOUNTER
PLEASE NOTE NEW ARRIVAL TIME:    Please arrive to [facility] main lobby at 07:30AM on 09:00 for your scheduled procedure. NOTHING to eat or drink after midnight, except medications instructed by the Pre-admit Nurse. (MyChart Message)

## 2022-05-06 ENCOUNTER — HOSPITAL ENCOUNTER (OUTPATIENT)
Facility: HOSPITAL | Age: 32
Discharge: HOME | End: 2022-05-06
Attending: OBSTETRICS & GYNECOLOGY | Admitting: OBSTETRICS & GYNECOLOGY
Payer: MEDICAID

## 2022-05-06 ENCOUNTER — ANESTHESIA EVENT (OUTPATIENT)
Dept: SURGERY | Facility: HOSPITAL | Age: 32
End: 2022-05-06
Payer: MEDICAID

## 2022-05-06 ENCOUNTER — ANESTHESIA (OUTPATIENT)
Dept: SURGERY | Facility: HOSPITAL | Age: 32
End: 2022-05-06
Payer: MEDICAID

## 2022-05-06 DIAGNOSIS — D06.9 HIGH GRADE SQUAMOUS INTRAEPITHELIAL LESION (HGSIL), GRADE 3 CIN, ON BIOPSY OF CERVIX: ICD-10-CM

## 2022-05-06 LAB
B-HCG UR QL: NEGATIVE
CTP QC/QA: YES
CTP QC/QA: YES
SARS-COV-2 AG RESP QL IA.RAPID: NEGATIVE

## 2022-05-06 PROCEDURE — 36000706: Performed by: OBSTETRICS & GYNECOLOGY

## 2022-05-06 PROCEDURE — 71000015 HC POSTOP RECOV 1ST HR: Performed by: OBSTETRICS & GYNECOLOGY

## 2022-05-06 PROCEDURE — 37000009 HC ANESTHESIA EA ADD 15 MINS: Performed by: OBSTETRICS & GYNECOLOGY

## 2022-05-06 PROCEDURE — 88307 TISSUE EXAM BY PATHOLOGIST: CPT | Performed by: STUDENT IN AN ORGANIZED HEALTH CARE EDUCATION/TRAINING PROGRAM

## 2022-05-06 PROCEDURE — 25000003 PHARM REV CODE 250: Performed by: OBSTETRICS & GYNECOLOGY

## 2022-05-06 PROCEDURE — 57522 PR CONIZATION CERVIX,LOOP ELECTRD: ICD-10-PCS | Mod: AS,,, | Performed by: PHYSICIAN ASSISTANT

## 2022-05-06 PROCEDURE — 71000033 HC RECOVERY, INTIAL HOUR: Performed by: OBSTETRICS & GYNECOLOGY

## 2022-05-06 PROCEDURE — 63600175 PHARM REV CODE 636 W HCPCS: Performed by: ANESTHESIOLOGY

## 2022-05-06 PROCEDURE — 63600175 PHARM REV CODE 636 W HCPCS: Performed by: NURSE ANESTHETIST, CERTIFIED REGISTERED

## 2022-05-06 PROCEDURE — 37000008 HC ANESTHESIA 1ST 15 MINUTES: Performed by: OBSTETRICS & GYNECOLOGY

## 2022-05-06 PROCEDURE — 57522 PR CONIZATION CERVIX,LOOP ELECTRD: ICD-10-PCS | Mod: ,,, | Performed by: OBSTETRICS & GYNECOLOGY

## 2022-05-06 PROCEDURE — 00940 ANES VAGINAL PX NOS: CPT | Performed by: OBSTETRICS & GYNECOLOGY

## 2022-05-06 PROCEDURE — 88307 TISSUE EXAM BY PATHOLOGIST: CPT | Mod: 26,,, | Performed by: STUDENT IN AN ORGANIZED HEALTH CARE EDUCATION/TRAINING PROGRAM

## 2022-05-06 PROCEDURE — 57522 CONIZATION OF CERVIX: CPT | Mod: AS,,, | Performed by: PHYSICIAN ASSISTANT

## 2022-05-06 PROCEDURE — 25000003 PHARM REV CODE 250: Performed by: NURSE ANESTHETIST, CERTIFIED REGISTERED

## 2022-05-06 PROCEDURE — 88307 PR  SURG PATH,LEVEL V: ICD-10-PCS | Mod: 26,,, | Performed by: STUDENT IN AN ORGANIZED HEALTH CARE EDUCATION/TRAINING PROGRAM

## 2022-05-06 PROCEDURE — 25000003 PHARM REV CODE 250: Performed by: ANESTHESIOLOGY

## 2022-05-06 PROCEDURE — 36000707: Performed by: OBSTETRICS & GYNECOLOGY

## 2022-05-06 PROCEDURE — 57522 CONIZATION OF CERVIX: CPT | Mod: ,,, | Performed by: OBSTETRICS & GYNECOLOGY

## 2022-05-06 RX ORDER — MIDAZOLAM HYDROCHLORIDE 1 MG/ML
INJECTION, SOLUTION INTRAMUSCULAR; INTRAVENOUS
Status: DISCONTINUED | OUTPATIENT
Start: 2022-05-06 | End: 2022-05-06

## 2022-05-06 RX ORDER — OXYCODONE AND ACETAMINOPHEN 5; 325 MG/1; MG/1
1 TABLET ORAL
Status: DISCONTINUED | OUTPATIENT
Start: 2022-05-06 | End: 2022-05-06 | Stop reason: HOSPADM

## 2022-05-06 RX ORDER — ONDANSETRON 2 MG/ML
4 INJECTION INTRAMUSCULAR; INTRAVENOUS DAILY PRN
Status: DISCONTINUED | OUTPATIENT
Start: 2022-05-06 | End: 2022-05-06 | Stop reason: HOSPADM

## 2022-05-06 RX ORDER — ONDANSETRON 8 MG/1
8 TABLET, ORALLY DISINTEGRATING ORAL EVERY 8 HOURS PRN
Status: DISCONTINUED | OUTPATIENT
Start: 2022-05-06 | End: 2022-05-06 | Stop reason: HOSPADM

## 2022-05-06 RX ORDER — HYDROMORPHONE HYDROCHLORIDE 2 MG/ML
0.2 INJECTION, SOLUTION INTRAMUSCULAR; INTRAVENOUS; SUBCUTANEOUS EVERY 5 MIN PRN
Status: DISCONTINUED | OUTPATIENT
Start: 2022-05-06 | End: 2022-05-06 | Stop reason: HOSPADM

## 2022-05-06 RX ORDER — ONDANSETRON 2 MG/ML
INJECTION INTRAMUSCULAR; INTRAVENOUS
Status: DISCONTINUED | OUTPATIENT
Start: 2022-05-06 | End: 2022-05-06

## 2022-05-06 RX ORDER — KETOROLAC TROMETHAMINE 30 MG/ML
15 INJECTION, SOLUTION INTRAMUSCULAR; INTRAVENOUS EVERY 8 HOURS PRN
Status: DISCONTINUED | OUTPATIENT
Start: 2022-05-06 | End: 2022-05-06 | Stop reason: HOSPADM

## 2022-05-06 RX ORDER — PROPOFOL 10 MG/ML
VIAL (ML) INTRAVENOUS
Status: DISCONTINUED | OUTPATIENT
Start: 2022-05-06 | End: 2022-05-06

## 2022-05-06 RX ORDER — FENTANYL CITRATE 50 UG/ML
INJECTION, SOLUTION INTRAMUSCULAR; INTRAVENOUS
Status: DISCONTINUED | OUTPATIENT
Start: 2022-05-06 | End: 2022-05-06

## 2022-05-06 RX ORDER — KETOROLAC TROMETHAMINE 30 MG/ML
15 INJECTION, SOLUTION INTRAMUSCULAR; INTRAVENOUS EVERY 6 HOURS PRN
Status: DISCONTINUED | OUTPATIENT
Start: 2022-05-06 | End: 2022-05-06 | Stop reason: HOSPADM

## 2022-05-06 RX ORDER — DEXTROSE MONOHYDRATE AND SODIUM CHLORIDE 5; .9 G/100ML; G/100ML
INJECTION, SOLUTION INTRAVENOUS CONTINUOUS
Status: DISCONTINUED | OUTPATIENT
Start: 2022-05-06 | End: 2022-05-06 | Stop reason: HOSPADM

## 2022-05-06 RX ORDER — LIDOCAINE HYDROCHLORIDE 10 MG/ML
INJECTION, SOLUTION EPIDURAL; INFILTRATION; INTRACAUDAL; PERINEURAL
Status: DISCONTINUED | OUTPATIENT
Start: 2022-05-06 | End: 2022-05-06

## 2022-05-06 RX ADMIN — ONDANSETRON 4 MG: 2 INJECTION, SOLUTION INTRAMUSCULAR; INTRAVENOUS at 11:05

## 2022-05-06 RX ADMIN — SODIUM CHLORIDE, POTASSIUM CHLORIDE, SODIUM LACTATE AND CALCIUM CHLORIDE: 600; 310; 30; 20 INJECTION, SOLUTION INTRAVENOUS at 10:05

## 2022-05-06 RX ADMIN — PROPOFOL 100 MG: 10 INJECTION, EMULSION INTRAVENOUS at 11:05

## 2022-05-06 RX ADMIN — OXYCODONE HYDROCHLORIDE AND ACETAMINOPHEN 1 TABLET: 5; 325 TABLET ORAL at 11:05

## 2022-05-06 RX ADMIN — MIDAZOLAM 2 MG: 1 INJECTION INTRAMUSCULAR; INTRAVENOUS at 10:05

## 2022-05-06 RX ADMIN — KETOROLAC TROMETHAMINE 15 MG: 30 INJECTION, SOLUTION INTRAMUSCULAR at 11:05

## 2022-05-06 RX ADMIN — LIDOCAINE HYDROCHLORIDE 50 MG: 10 INJECTION, SOLUTION EPIDURAL; INFILTRATION; INTRACAUDAL; PERINEURAL at 11:05

## 2022-05-06 RX ADMIN — FENTANYL CITRATE 100 MCG: 50 INJECTION, SOLUTION INTRAMUSCULAR; INTRAVENOUS at 11:05

## 2022-05-06 NOTE — ANESTHESIA PREPROCEDURE EVALUATION
2022  Norma De La Garza is a 31 y.o., female.    Patient Active Problem List   Diagnosis    Hepatitis C virus infection without hepatic coma     Past Surgical History:   Procedure Laterality Date     SECTION      DILATION AND CURETTAGE OF UTERUS      jaw reconstruction  2008    TONSILLECTOMY         Pre-op Assessment    I have reviewed the Patient Summary Reports.     I have reviewed the Nursing Notes. I have reviewed the NPO Status.   I have reviewed the Medications.     Review of Systems  Anesthesia Hx:  No problems with previous Anesthesia    Social:  Former Smoker Smokeless Tobacco Status: Current User    Drug use: Yes, unspecified amount   Hematology/Oncology:  Hematology Normal        Cardiovascular:  Cardiovascular Normal     Pulmonary:  Pulmonary Normal    Renal/:  Renal/ Normal     Hepatic/GI:   Hepatitis, C Hepatitis C virus infection without hepatic coma   Neurological:  Neurology Normal    Endocrine:  Endocrine Normal        Physical Exam  General: Cachexia    Airway:  Mallampati: I   Mouth Opening: Normal  TM Distance: Normal  Neck ROM: Normal ROM    Dental:  Loose teeth, Periodontal disease  Very poor dentition.  States many of her teeth are loose.  I mentioned dental injury as a risk. She understands and wishes to proceed      Anesthesia Plan  Type of Anesthesia, risks & benefits discussed:    Anesthesia Type: Gen ETT, Gen Supraglottic Airway, MAC  Intra-op Monitoring Plan: Standard ASA Monitors  Post Op Pain Control Plan: multimodal analgesia  Induction:  IV  Airway Plan: , Post-Induction  Informed Consent: Informed consent signed with the Patient and all parties understand the risks and agree with anesthesia plan.  All questions answered.   ASA Score: 2    Ready For Surgery From Anesthesia Perspective.     .      Chemistry        Component Value Date/Time      05/04/2022 0831    K 4.5 05/04/2022 0831     05/04/2022 0831    CO2 23 05/04/2022 0831    BUN 10 05/04/2022 0831    CREATININE 0.6 05/04/2022 0831     05/04/2022 0831        Component Value Date/Time    CALCIUM 9.1 05/04/2022 0831    ALKPHOS 59 05/25/2020 1746    AST 45 (H) 05/25/2020 1746    ALT 25 05/25/2020 1746    BILITOT 0.3 05/25/2020 1746    ESTGFRAFRICA >60.0 05/04/2022 0831    EGFRNONAA >60.0 05/04/2022 0831        Lab Results   Component Value Date    WBC 5.83 05/04/2022    HGB 14.1 05/04/2022    HCT 44.4 05/04/2022    MCV 88 05/04/2022     05/04/2022

## 2022-05-06 NOTE — DISCHARGE SUMMARY
[facility]  Discharge Note  Short Stay    Procedure(s) (LRB):  LEEP CONIZATION, CERVIX (N/A)    OUTCOME: Condition has improved and patient is now ready for discharge.    DISPOSITION: Home or Self Care    FINAL DIAGNOSIS:  <principal problem not specified>    FOLLOWUP: In clinic  In 4 weeks     DISCHARGE INSTRUCTIONS:    Discharge Procedure Orders   Diet general     No dressing needed     Call MD for:  temperature >100.4     Call MD for:  persistent nausea and vomiting     Call MD for:  severe uncontrolled pain     Call MD for:  difficulty breathing, headache or visual disturbances     Call MD for:  redness, tenderness, or signs of infection (pain, swelling, redness, odor or green/yellow discharge around incision site)     Call MD for:  hives     Call MD for:  persistent dizziness or light-headedness     Call MD for:  extreme fatigue     Shower on day dressing removed (No bath)        TIME SPENT ON DISCHARGE: 5  minutes

## 2022-05-06 NOTE — TRANSFER OF CARE
"Anesthesia Transfer of Care Note    Patient: Norma De La Garza    Procedure(s) Performed: Procedure(s) (LRB):  LEEP CONIZATION, CERVIX (N/A)    Patient location: PACU    Anesthesia Type: general    Transport from OR: Transported from OR on room air with adequate spontaneous ventilation    Post pain: adequate analgesia    Post assessment: no apparent anesthetic complications and tolerated procedure well    Post vital signs: stable    Level of consciousness: sedated    Nausea/Vomiting: no nausea/vomiting    Complications: none    Transfer of care protocol was followed      Last vitals:   Visit Vitals  /66 (BP Location: Left arm, Patient Position: Lying)   Pulse 76   Temp 36.7 °C (98 °F) (Temporal)   Resp 16   Ht 5' 4" (1.626 m)   Wt 48.9 kg (107 lb 12.9 oz)   LMP 04/15/2022   SpO2 100%   Breastfeeding No   BMI 18.50 kg/m²     "

## 2022-05-06 NOTE — OP NOTE
[facility] - Surgery ([facility])  Surgery Department  Operative Note    SUMMARY     Date of Procedure: 5/6/2022     Procedure: Procedure(s) (LRB):  LEEP CONIZATION, CERVIX (N/A)     Surgeon(s) and Role:     * DESTINY Mariee MD - Primary    Assisting Surgeon:Katie PICKETT     Pre-Operative Diagnosis: Severe dysplasia of cervix [D06.9]    Post-Operative Diagnosis: Post-Op Diagnosis Codes:     * Severe dysplasia of cervix [D06.9]    Anesthesia: General/MAC    Operative Findings (including complications, if any):       Cervix appears normal , no vaginal lesions     Description of Technical Procedures:       Procedure:  LEEP Excision of the cervix  DX:  Lori 3  Anesth:  General    Lidocaine with epi paracervical block:   8 cc total  Time out done with all participants.   The cervix was visualized using weighted speculum and right angle retractor   local placed paracervically    The correct size loop selected and excision performed with cut power , blend, set at 50/ 50 nick.     Base of the excised area cauterized with ball cautery attachment    Specimen collected and sent to pathology     EBL: 1 cc  Complication:  None  Condition of Patient:  Stable    Post op instructions:  Pelvic rest for 3 weeks, call for excessive vaginal bleeding , pelvic pain,      Follow up   6 months for pap     Significant Surgical Tasks Conducted by the Assistant(s), if Applicable: first assist     Estimated Blood Loss (EBL): 1cc           Implants: * No implants in log *    Specimens:        Cervical top hat            Condition: Good    Disposition: PACU - hemodynamically stable.    Attestation: I was present and scrubbed for the entire procedure.

## 2022-05-06 NOTE — ANESTHESIA POSTPROCEDURE EVALUATION
Anesthesia Post Evaluation    Patient: Norma De La Garza    Procedure(s) Performed: Procedure(s) (LRB):  LEEP CONIZATION, CERVIX (N/A)    Final Anesthesia Type: general      Patient location during evaluation: PACU  Patient participation: Yes- Able to Participate  Level of consciousness: sedated and awake and alert  Post-procedure vital signs: reviewed and stable  Pain management: adequate  Airway patency: patent    PONV status at discharge: No PONV  Anesthetic complications: no      Cardiovascular status: blood pressure returned to baseline  Respiratory status: spontaneous ventilation  Hydration status: euvolemic  Follow-up not needed.          Vitals Value Taken Time   /68 05/06/22 1140   Temp 36.7 °C (98 °F) 05/06/22 1131   Pulse 59 05/06/22 1142   Resp 15 05/06/22 1142   SpO2 99 % 05/06/22 1142   Vitals shown include unvalidated device data.      No case tracking events are documented in the log.      Pain/Alem Score: Alem Score: 5 (5/6/2022 11:30 AM)

## 2022-05-11 VITALS
HEIGHT: 64 IN | BODY MASS INDEX: 18.4 KG/M2 | RESPIRATION RATE: 13 BRPM | DIASTOLIC BLOOD PRESSURE: 68 MMHG | WEIGHT: 107.81 LBS | OXYGEN SATURATION: 99 % | SYSTOLIC BLOOD PRESSURE: 107 MMHG | TEMPERATURE: 98 F | HEART RATE: 53 BPM

## 2022-05-12 ENCOUNTER — TELEPHONE (OUTPATIENT)
Dept: OBSTETRICS AND GYNECOLOGY | Facility: CLINIC | Age: 32
End: 2022-05-12
Payer: MEDICAID

## 2022-05-12 NOTE — TELEPHONE ENCOUNTER
----- Message from Judith Pierre sent at 5/12/2022  9:03 AM CDT -----  Contact: pt  The pt request a return call concerning her test results, no additional info given and can be reached at [phone]///[id number]

## 2022-05-12 NOTE — TELEPHONE ENCOUNTER
Patient calling asking about result from LEEP.  Still in process.  Patient verbalized understanding.

## 2022-05-17 LAB
FINAL PATHOLOGIC DIAGNOSIS: NORMAL
GROSS: NORMAL
Lab: NORMAL

## 2022-05-24 ENCOUNTER — TELEPHONE (OUTPATIENT)
Dept: OBSTETRICS AND GYNECOLOGY | Facility: CLINIC | Age: 32
End: 2022-05-24
Payer: MEDICAID

## 2022-05-24 NOTE — TELEPHONE ENCOUNTER
Notified pt pathology results for LEEP Procedure, Pt Verbalized understanding     Beatrice JACOB LPN  OB/GYN

## 2022-05-24 NOTE — TELEPHONE ENCOUNTER
----- Message from Laurence Cummins sent at 5/24/2022  3:55 PM CDT -----  Contact: Norma  .Type:  Test Results    Who Called: Norma  Name of Test (Lab/Mammo/Etc): Demetrio Notrh  Date of Test: 5/6/22  Ordering Provider: Dr. Mariee  Where the test was performed: [facility]  Would the patient rather a call back or a response via [facility]? call  Best Call Back Number: [phone]   Additional Information:  Patient request a call back regarding test results when possible.  Thank you,  GH

## 2022-06-01 ENCOUNTER — TELEPHONE (OUTPATIENT)
Dept: OBSTETRICS AND GYNECOLOGY | Facility: CLINIC | Age: 32
End: 2022-06-01
Payer: MEDICAID

## 2022-06-01 NOTE — TELEPHONE ENCOUNTER
----- Message from Rachel De La Garza sent at 6/1/2022  3:35 PM CDT -----  Pt states she has found out some info and is needing advice on what she needs to do next. Please call [phone] ASAP

## 2022-06-01 NOTE — TELEPHONE ENCOUNTER
Called patient and she found out she is pregnant and is trying to be admitted to Detox.  Patient currently at [facility].  Advised patient she will need to be seen by OB and given release for detox.  She will call back if she needs appointment.

## 2022-06-02 ENCOUNTER — TELEPHONE (OUTPATIENT)
Dept: OBSTETRICS AND GYNECOLOGY | Facility: CLINIC | Age: 32
End: 2022-06-02
Payer: MEDICAID

## 2022-06-02 NOTE — TELEPHONE ENCOUNTER
Incoming call from pt. Pt calling regarding questions about detoxing and LEEP procedure while pregnant. Informed pt that I would route a message to Dr. Mariee regarding her concerns and someone will get back to her. Pt verbalized understanding.

## 2022-06-02 NOTE — TELEPHONE ENCOUNTER
Returned call to pt. Per Dr. Mariee, I informed pt that her LEEP is not of concern at this time and that she should be able to undergo detox. Advised pt with scheduling a pregnancy confirmation visit. Confirmed time/date/location of appt with pt. Pt verbalized understanding.

## 2022-06-13 ENCOUNTER — TELEPHONE (OUTPATIENT)
Dept: OBSTETRICS AND GYNECOLOGY | Facility: CLINIC | Age: 32
End: 2022-06-13
Payer: MEDICAID

## 2022-06-13 NOTE — TELEPHONE ENCOUNTER
----- Message from Carolina Mckee sent at 6/13/2022  7:32 AM CDT -----  Regarding: appt  Contact: patient  Patient would like to reschedule her appt, please call her back at [phone]

## 2022-06-15 ENCOUNTER — LAB VISIT (OUTPATIENT)
Dept: LAB | Facility: HOSPITAL | Age: 32
End: 2022-06-15
Attending: MIDWIFE
Payer: MEDICAID

## 2022-06-15 ENCOUNTER — TELEPHONE (OUTPATIENT)
Dept: HEMATOLOGY/ONCOLOGY | Facility: CLINIC | Age: 32
End: 2022-06-15
Payer: MEDICAID

## 2022-06-15 ENCOUNTER — OFFICE VISIT (OUTPATIENT)
Dept: OBSTETRICS AND GYNECOLOGY | Facility: CLINIC | Age: 32
End: 2022-06-15
Payer: MEDICAID

## 2022-06-15 VITALS
BODY MASS INDEX: 19.35 KG/M2 | HEIGHT: 64 IN | SYSTOLIC BLOOD PRESSURE: 100 MMHG | WEIGHT: 113.31 LBS | DIASTOLIC BLOOD PRESSURE: 60 MMHG

## 2022-06-15 DIAGNOSIS — B18.1 HEPATITIS B CARRIER: ICD-10-CM

## 2022-06-15 DIAGNOSIS — I82.4Y1 ACUTE DEEP VEIN THROMBOSIS (DVT) OF PROXIMAL VEIN OF RIGHT LOWER EXTREMITY: ICD-10-CM

## 2022-06-15 DIAGNOSIS — O26.841 UTERINE SIZE DATE DISCREPANCY PREGNANCY, FIRST TRIMESTER: ICD-10-CM

## 2022-06-15 DIAGNOSIS — Z32.01 POSITIVE PREGNANCY TEST: Primary | ICD-10-CM

## 2022-06-15 DIAGNOSIS — Z32.01 POSITIVE PREGNANCY TEST: ICD-10-CM

## 2022-06-15 DIAGNOSIS — N91.2 AMENORRHEA: ICD-10-CM

## 2022-06-15 LAB
ABO + RH BLD: ABNORMAL
APTT BLDCRRT: 29 SEC (ref 21–32)
B-HCG UR QL: POSITIVE
BASOPHILS # BLD AUTO: 0.04 K/UL (ref 0–0.2)
BASOPHILS NFR BLD: 0.5 % (ref 0–1.9)
BLD GP AB SCN CELLS X3 SERPL QL: ABNORMAL
CTP QC/QA: YES
DIFFERENTIAL METHOD: ABNORMAL
EOSINOPHIL # BLD AUTO: 0.2 K/UL (ref 0–0.5)
EOSINOPHIL NFR BLD: 2.1 % (ref 0–8)
ERYTHROCYTE [DISTWIDTH] IN BLOOD BY AUTOMATED COUNT: 13.3 % (ref 11.5–14.5)
FIBRINOGEN PPP-MCNC: 295 MG/DL (ref 182–400)
HCT VFR BLD AUTO: 42 % (ref 37–48.5)
HGB BLD-MCNC: 13.3 G/DL (ref 12–16)
IMM GRANULOCYTES # BLD AUTO: 0.05 K/UL (ref 0–0.04)
IMM GRANULOCYTES NFR BLD AUTO: 0.6 % (ref 0–0.5)
INR PPP: 1 (ref 0.8–1.2)
LYMPHOCYTES # BLD AUTO: 2.6 K/UL (ref 1–4.8)
LYMPHOCYTES NFR BLD: 31.8 % (ref 18–48)
MCH RBC QN AUTO: 28.2 PG (ref 27–31)
MCHC RBC AUTO-ENTMCNC: 31.7 G/DL (ref 32–36)
MCV RBC AUTO: 89 FL (ref 82–98)
MONOCYTES # BLD AUTO: 0.5 K/UL (ref 0.3–1)
MONOCYTES NFR BLD: 6 % (ref 4–15)
NEUTROPHILS # BLD AUTO: 4.7 K/UL (ref 1.8–7.7)
NEUTROPHILS NFR BLD: 59 % (ref 38–73)
NRBC BLD-RTO: 0 /100 WBC
PLATELET # BLD AUTO: 245 K/UL (ref 150–450)
PMV BLD AUTO: 10.9 FL (ref 9.2–12.9)
PROTHROMBIN TIME: 10.8 SEC (ref 9–12.5)
RBC # BLD AUTO: 4.71 M/UL (ref 4–5.4)
WBC # BLD AUTO: 8.03 K/UL (ref 3.9–12.7)

## 2022-06-15 PROCEDURE — 81025 URINE PREGNANCY TEST: CPT | Mod: PBBFAC | Performed by: MIDWIFE

## 2022-06-15 PROCEDURE — 85384 FIBRINOGEN ACTIVITY: CPT | Performed by: MIDWIFE

## 2022-06-15 PROCEDURE — 3078F PR MOST RECENT DIASTOLIC BLOOD PRESSURE < 80 MM HG: ICD-10-PCS | Mod: CPTII,,, | Performed by: MIDWIFE

## 2022-06-15 PROCEDURE — 85610 PROTHROMBIN TIME: CPT | Performed by: MIDWIFE

## 2022-06-15 PROCEDURE — 3008F PR BODY MASS INDEX (BMI) DOCUMENTED: ICD-10-PCS | Mod: CPTII,,, | Performed by: MIDWIFE

## 2022-06-15 PROCEDURE — 3074F PR MOST RECENT SYSTOLIC BLOOD PRESSURE < 130 MM HG: ICD-10-PCS | Mod: CPTII,,, | Performed by: MIDWIFE

## 2022-06-15 PROCEDURE — 3074F SYST BP LT 130 MM HG: CPT | Mod: CPTII,,, | Performed by: MIDWIFE

## 2022-06-15 PROCEDURE — 99999 PR PBB SHADOW E&M-EST. PATIENT-LVL III: ICD-10-PCS | Mod: PBBFAC,,, | Performed by: MIDWIFE

## 2022-06-15 PROCEDURE — 86870 RBC ANTIBODY IDENTIFICATION: CPT | Performed by: MIDWIFE

## 2022-06-15 PROCEDURE — 99999 PR PBB SHADOW E&M-EST. PATIENT-LVL III: CPT | Mod: PBBFAC,,, | Performed by: MIDWIFE

## 2022-06-15 PROCEDURE — 99213 PR OFFICE/OUTPT VISIT, EST, LEVL III, 20-29 MIN: ICD-10-PCS | Mod: S$PBB,TH,, | Performed by: MIDWIFE

## 2022-06-15 PROCEDURE — 3078F DIAST BP <80 MM HG: CPT | Mod: CPTII,,, | Performed by: MIDWIFE

## 2022-06-15 PROCEDURE — 87086 URINE CULTURE/COLONY COUNT: CPT | Performed by: MIDWIFE

## 2022-06-15 PROCEDURE — 99213 OFFICE O/P EST LOW 20 MIN: CPT | Mod: PBBFAC,25,TH | Performed by: MIDWIFE

## 2022-06-15 PROCEDURE — 3008F BODY MASS INDEX DOCD: CPT | Mod: CPTII,,, | Performed by: MIDWIFE

## 2022-06-15 PROCEDURE — 99213 OFFICE O/P EST LOW 20 MIN: CPT | Mod: S$PBB,TH,, | Performed by: MIDWIFE

## 2022-06-15 PROCEDURE — 86886 COOMBS TEST INDIRECT TITER: CPT | Mod: 91 | Performed by: MIDWIFE

## 2022-06-15 PROCEDURE — 86592 SYPHILIS TEST NON-TREP QUAL: CPT | Performed by: MIDWIFE

## 2022-06-15 PROCEDURE — 86905 BLOOD TYPING RBC ANTIGENS: CPT | Performed by: MIDWIFE

## 2022-06-15 PROCEDURE — 86762 RUBELLA ANTIBODY: CPT | Performed by: MIDWIFE

## 2022-06-15 PROCEDURE — 85025 COMPLETE CBC W/AUTO DIFF WBC: CPT | Performed by: MIDWIFE

## 2022-06-15 PROCEDURE — 86901 BLOOD TYPING SEROLOGIC RH(D): CPT | Performed by: MIDWIFE

## 2022-06-15 PROCEDURE — 87389 HIV-1 AG W/HIV-1&-2 AB AG IA: CPT | Performed by: MIDWIFE

## 2022-06-15 PROCEDURE — 85730 THROMBOPLASTIN TIME PARTIAL: CPT | Performed by: MIDWIFE

## 2022-06-15 PROCEDURE — 36415 COLL VENOUS BLD VENIPUNCTURE: CPT | Performed by: MIDWIFE

## 2022-06-15 PROCEDURE — 83020 HEMOGLOBIN ELECTROPHORESIS: CPT | Performed by: MIDWIFE

## 2022-06-15 PROCEDURE — 80074 ACUTE HEPATITIS PANEL: CPT | Performed by: MIDWIFE

## 2022-06-15 RX ORDER — AMOXICILLIN 500 MG/1
500 CAPSULE ORAL 2 TIMES DAILY
COMMUNITY
Start: 2022-06-08 | End: 2022-06-23

## 2022-06-15 NOTE — TELEPHONE ENCOUNTER
Spoke to patient in reference to Hematology referral from Mosoe Hendrix CNM .  Appointment scheduled per patient's request next available.  Appointment notice via Remind[facility].

## 2022-06-16 LAB
BLD GP AB SCN TITR SERPL: 4 {TITER}
BLD GP AB SCN TITR SERPL: 8 {TITER}
BLOOD GROUP ANTIBODIES SERPL: NORMAL
HIV 1+2 AB+HIV1 P24 AG SERPL QL IA: NEGATIVE
RPR SER QL: NORMAL
RUBV IGG SER-ACNC: 11.4 IU/ML
RUBV IGG SER-IMP: REACTIVE

## 2022-06-17 LAB
BACTERIA UR CULT: NORMAL
HAV IGM SERPL QL IA: NEGATIVE
HBV CORE IGM SERPL QL IA: NEGATIVE
HBV SURFACE AG SERPL QL IA: NEGATIVE
HCV AB SERPL QL IA: POSITIVE
HGB A2 MFR BLD HPLC: 2.8 % (ref 2.2–3.2)
HGB FRACT BLD ELPH-IMP: NORMAL
HGB FRACT BLD ELPH-IMP: NORMAL

## 2022-06-23 ENCOUNTER — PROCEDURE VISIT (OUTPATIENT)
Dept: OBSTETRICS AND GYNECOLOGY | Facility: CLINIC | Age: 32
End: 2022-06-23
Payer: MEDICAID

## 2022-06-23 ENCOUNTER — LAB VISIT (OUTPATIENT)
Dept: LAB | Facility: HOSPITAL | Age: 32
End: 2022-06-23
Attending: OBSTETRICS & GYNECOLOGY
Payer: MEDICAID

## 2022-06-23 ENCOUNTER — OFFICE VISIT (OUTPATIENT)
Dept: OBSTETRICS AND GYNECOLOGY | Facility: CLINIC | Age: 32
End: 2022-06-23
Payer: MEDICAID

## 2022-06-23 VITALS
SYSTOLIC BLOOD PRESSURE: 114 MMHG | HEIGHT: 64 IN | WEIGHT: 114.63 LBS | BODY MASS INDEX: 19.57 KG/M2 | DIASTOLIC BLOOD PRESSURE: 76 MMHG

## 2022-06-23 DIAGNOSIS — O26.841 UTERINE SIZE DATE DISCREPANCY PREGNANCY, FIRST TRIMESTER: ICD-10-CM

## 2022-06-23 DIAGNOSIS — O20.0 THREATENED ABORTION: Primary | ICD-10-CM

## 2022-06-23 DIAGNOSIS — R30.0 DYSURIA: ICD-10-CM

## 2022-06-23 DIAGNOSIS — O20.0 THREATENED ABORTION: ICD-10-CM

## 2022-06-23 LAB
ABO + RH BLD: ABNORMAL
BASOPHILS # BLD AUTO: 0.04 K/UL (ref 0–0.2)
BASOPHILS NFR BLD: 0.4 % (ref 0–1.9)
BLD GP AB SCN CELLS X3 SERPL QL: ABNORMAL
DIFFERENTIAL METHOD: NORMAL
EOSINOPHIL # BLD AUTO: 0.3 K/UL (ref 0–0.5)
EOSINOPHIL NFR BLD: 3.1 % (ref 0–8)
ERYTHROCYTE [DISTWIDTH] IN BLOOD BY AUTOMATED COUNT: 12.9 % (ref 11.5–14.5)
HCG INTACT+B SERPL-ACNC: NORMAL MIU/ML
HCT VFR BLD AUTO: 37.6 % (ref 37–48.5)
HGB BLD-MCNC: 12.9 G/DL (ref 12–16)
IMM GRANULOCYTES # BLD AUTO: 0.03 K/UL (ref 0–0.04)
IMM GRANULOCYTES NFR BLD AUTO: 0.3 % (ref 0–0.5)
LYMPHOCYTES # BLD AUTO: 3.5 K/UL (ref 1–4.8)
LYMPHOCYTES NFR BLD: 39.4 % (ref 18–48)
MCH RBC QN AUTO: 28.6 PG (ref 27–31)
MCHC RBC AUTO-ENTMCNC: 34.3 G/DL (ref 32–36)
MCV RBC AUTO: 83 FL (ref 82–98)
MONOCYTES # BLD AUTO: 0.5 K/UL (ref 0.3–1)
MONOCYTES NFR BLD: 5.8 % (ref 4–15)
NEUTROPHILS # BLD AUTO: 4.5 K/UL (ref 1.8–7.7)
NEUTROPHILS NFR BLD: 51 % (ref 38–73)
NRBC BLD-RTO: 0 /100 WBC
PLATELET # BLD AUTO: 275 K/UL (ref 150–450)
PMV BLD AUTO: 9.9 FL (ref 9.2–12.9)
RBC # BLD AUTO: 4.51 M/UL (ref 4–5.4)
WBC # BLD AUTO: 8.91 K/UL (ref 3.9–12.7)

## 2022-06-23 PROCEDURE — 99999 PR PBB SHADOW E&M-EST. PATIENT-LVL III: ICD-10-PCS | Mod: PBBFAC,,, | Performed by: OBSTETRICS & GYNECOLOGY

## 2022-06-23 PROCEDURE — 99213 PR OFFICE/OUTPT VISIT, EST, LEVL III, 20-29 MIN: ICD-10-PCS | Mod: 24,S$PBB,TH, | Performed by: OBSTETRICS & GYNECOLOGY

## 2022-06-23 PROCEDURE — 84702 CHORIONIC GONADOTROPIN TEST: CPT | Performed by: OBSTETRICS & GYNECOLOGY

## 2022-06-23 PROCEDURE — 99213 OFFICE O/P EST LOW 20 MIN: CPT | Mod: PBBFAC,TH | Performed by: OBSTETRICS & GYNECOLOGY

## 2022-06-23 PROCEDURE — 3078F DIAST BP <80 MM HG: CPT | Mod: CPTII,,, | Performed by: OBSTETRICS & GYNECOLOGY

## 2022-06-23 PROCEDURE — 85025 COMPLETE CBC W/AUTO DIFF WBC: CPT | Performed by: OBSTETRICS & GYNECOLOGY

## 2022-06-23 PROCEDURE — 76801 US OB/GYN PROCEDURE (VIEWPOINT): ICD-10-PCS | Mod: 26,S$PBB,, | Performed by: OBSTETRICS & GYNECOLOGY

## 2022-06-23 PROCEDURE — 1159F MED LIST DOCD IN RCRD: CPT | Mod: CPTII,,, | Performed by: OBSTETRICS & GYNECOLOGY

## 2022-06-23 PROCEDURE — 3078F PR MOST RECENT DIASTOLIC BLOOD PRESSURE < 80 MM HG: ICD-10-PCS | Mod: CPTII,,, | Performed by: OBSTETRICS & GYNECOLOGY

## 2022-06-23 PROCEDURE — 87591 N.GONORRHOEAE DNA AMP PROB: CPT | Performed by: OBSTETRICS & GYNECOLOGY

## 2022-06-23 PROCEDURE — 99213 OFFICE O/P EST LOW 20 MIN: CPT | Mod: 24,S$PBB,TH, | Performed by: OBSTETRICS & GYNECOLOGY

## 2022-06-23 PROCEDURE — 86886 COOMBS TEST INDIRECT TITER: CPT | Performed by: OBSTETRICS & GYNECOLOGY

## 2022-06-23 PROCEDURE — 87491 CHLMYD TRACH DNA AMP PROBE: CPT | Performed by: OBSTETRICS & GYNECOLOGY

## 2022-06-23 PROCEDURE — 1160F PR REVIEW ALL MEDS BY PRESCRIBER/CLIN PHARMACIST DOCUMENTED: ICD-10-PCS | Mod: CPTII,,, | Performed by: OBSTETRICS & GYNECOLOGY

## 2022-06-23 PROCEDURE — 86850 RBC ANTIBODY SCREEN: CPT | Performed by: OBSTETRICS & GYNECOLOGY

## 2022-06-23 PROCEDURE — 81002 URINALYSIS NONAUTO W/O SCOPE: CPT | Mod: PBBFAC | Performed by: OBSTETRICS & GYNECOLOGY

## 2022-06-23 PROCEDURE — 76801 OB US < 14 WKS SINGLE FETUS: CPT | Mod: PBBFAC | Performed by: OBSTETRICS & GYNECOLOGY

## 2022-06-23 PROCEDURE — 86870 RBC ANTIBODY IDENTIFICATION: CPT | Performed by: OBSTETRICS & GYNECOLOGY

## 2022-06-23 PROCEDURE — 3074F SYST BP LT 130 MM HG: CPT | Mod: CPTII,,, | Performed by: OBSTETRICS & GYNECOLOGY

## 2022-06-23 PROCEDURE — 3008F PR BODY MASS INDEX (BMI) DOCUMENTED: ICD-10-PCS | Mod: CPTII,,, | Performed by: OBSTETRICS & GYNECOLOGY

## 2022-06-23 PROCEDURE — 1160F RVW MEDS BY RX/DR IN RCRD: CPT | Mod: CPTII,,, | Performed by: OBSTETRICS & GYNECOLOGY

## 2022-06-23 PROCEDURE — 3074F PR MOST RECENT SYSTOLIC BLOOD PRESSURE < 130 MM HG: ICD-10-PCS | Mod: CPTII,,, | Performed by: OBSTETRICS & GYNECOLOGY

## 2022-06-23 PROCEDURE — 3008F BODY MASS INDEX DOCD: CPT | Mod: CPTII,,, | Performed by: OBSTETRICS & GYNECOLOGY

## 2022-06-23 PROCEDURE — 1159F PR MEDICATION LIST DOCUMENTED IN MEDICAL RECORD: ICD-10-PCS | Mod: CPTII,,, | Performed by: OBSTETRICS & GYNECOLOGY

## 2022-06-23 PROCEDURE — 99999 PR PBB SHADOW E&M-EST. PATIENT-LVL III: CPT | Mod: PBBFAC,,, | Performed by: OBSTETRICS & GYNECOLOGY

## 2022-06-23 NOTE — PROGRESS NOTES
Subjective:       Patient ID: Norma De La Garza is a 32 y.o. female.    Chief Complaint:  Vaginal Bleeding      History of Present Illness  HPI  Pt reports complaints of vaginal spotting since this morning.  Pt is early pregnant and is concerned.  Denies any pains.  Pregnancy was not planned but is desired.  Pt admits to daily Heroin use and is currently waiting on Detox.  Pt has a history of DVT that was treated with Xarelto.  Has not used Xarelto in over 1 yr.    GYN & OB History  Patient's last menstrual period was 04/15/2022.   Date of Last Pap: 3/7/2022    OB History    Para Term  AB Living   6 2 0 2 3 3   SAB IAB Ectopic Multiple Live Births   3     1 3      # Outcome Date GA Lbr Huseyin/2nd Weight Sex Delivery Anes PTL Lv   6 Current            5 SAB 21 5w0d          4  / 32w0d   F CS-Unspec EPI  ELTON   3 SAB 06/15/10 17w0d   F  EPI  FD      Complications: Rh incompatibility   2A  09 32w0d   F CS-Unspec OTHER, EPI  ELTON   2B  09 32w0d   M CS-Unspec EPI N ELTON   1 SAB                Review of Systems  Review of Systems   Constitutional: Negative for activity change, appetite change, chills, fatigue, fever and unexpected weight change.   Respiratory: Negative for shortness of breath.    Cardiovascular: Negative for chest pain, palpitations and leg swelling.   Gastrointestinal: Negative for abdominal pain, bloating, blood in stool, constipation, diarrhea, nausea and vomiting.   Genitourinary: Positive for dysuria and vaginal bleeding. Negative for dysmenorrhea, dyspareunia, flank pain, frequency, genital sores, hematuria, menorrhagia, menstrual problem, pelvic pain, urgency, vaginal discharge, vaginal pain, urinary incontinence, postcoital bleeding, vaginal dryness and vaginal odor.   Musculoskeletal: Negative for back pain.   Neurological: Negative for syncope and headaches.           Objective:    Physical Exam:   Constitutional: She is oriented to  person, place, and time. She appears well-developed and well-nourished. No distress.       Cardiovascular: Normal rate and regular rhythm.     Pulmonary/Chest: Effort normal and breath sounds normal.        Abdominal: Soft. Bowel sounds are normal. She exhibits no distension. There is no abdominal tenderness.     Genitourinary:    Uterus, right adnexa and left adnexa normal.      Pelvic exam was performed with patient supine.   There is no rash, tenderness, lesion or injury on the right labia. There is no rash, tenderness, lesion or injury on the left labia. Cervix is normal. Right adnexum displays no mass, no tenderness and no fullness. Left adnexum displays no mass, no tenderness and no fullness. There is bleeding (trace amount blood in vault; no active bleeding) in the vagina. No erythema,  no vaginal discharge or tenderness in the vagina.    No foreign body in the vagina.      No signs of injury in the vagina.   Cervix exhibits no motion tenderness, no discharge and no friability. Uterus is not deviated, not enlarged and not tender.    Genitourinary Comments: Cervix closed  UA today: 2+ leuk; 2+ blood; negative nitrites             Musculoskeletal: Normal range of motion and moves all extremeties. No tenderness or edema.       Neurological: She is alert and oriented to person, place, and time.    Skin: Skin is warm and dry.    Psychiatric: She has a normal mood and affect. Her behavior is normal. Thought content normal.          US today: GS with fetal pole (CRL 22.6; 9w0d) with no cardiac activity; normal appearing uterus and right adnexa; left adnexal cyst (2.7 x 2.3 x 2.4 cm); no free fluid noted     Assessment:        1. Threatened     2. Dysuria               Plan:      Threatened   -     CBC Auto Differential; Future; Expected date: 2022  -     HCG, Quantitative; Future; Expected date: 2022  -     Type & Screen; Future  -     HCG, Quantitative; Future; Expected date:  06/23/2022  -     C. trachomatis/N. gonorrhoeae by AMP DNA  -     Findings are concerning for SAB.  Pt with history of SAB x 5.  Recommend follow up with labs.  Pt counseled on management options if SAB confirmed.  Is most interested in expectant management.  Pt counseled on SAB precautions.    Dysuria  -     POCT URINE DIPSTICK WITHOUT MICROSCOPE  -     No evidence of UTI.  Pt reassured.      Follow up in about 1 week (around 6/30/2022).

## 2022-06-24 ENCOUNTER — NURSE TRIAGE (OUTPATIENT)
Dept: ADMINISTRATIVE | Facility: CLINIC | Age: 32
End: 2022-06-24
Payer: MEDICAID

## 2022-06-24 ENCOUNTER — HOSPITAL ENCOUNTER (EMERGENCY)
Facility: HOSPITAL | Age: 32
Discharge: HOME | End: 2022-06-24
Attending: EMERGENCY MEDICINE
Payer: MEDICAID

## 2022-06-24 VITALS
SYSTOLIC BLOOD PRESSURE: 118 MMHG | DIASTOLIC BLOOD PRESSURE: 70 MMHG | WEIGHT: 114 LBS | TEMPERATURE: 98 F | BODY MASS INDEX: 19.46 KG/M2 | HEIGHT: 64 IN | OXYGEN SATURATION: 100 % | RESPIRATION RATE: 20 BRPM | HEART RATE: 74 BPM

## 2022-06-24 DIAGNOSIS — O03.9 MISCARRIAGE: Primary | ICD-10-CM

## 2022-06-24 LAB
ALBUMIN SERPL BCP-MCNC: 3.7 G/DL (ref 3.5–5.2)
ALP SERPL-CCNC: 67 U/L (ref 55–135)
ALT SERPL W/O P-5'-P-CCNC: 34 U/L (ref 10–44)
ANION GAP SERPL CALC-SCNC: 12 MMOL/L (ref 8–16)
AST SERPL-CCNC: 22 U/L (ref 10–40)
BACTERIA #/AREA URNS HPF: ABNORMAL /HPF
BASOPHILS # BLD AUTO: 0.04 K/UL (ref 0–0.2)
BASOPHILS NFR BLD: 0.5 % (ref 0–1.9)
BILIRUB SERPL-MCNC: 0.6 MG/DL (ref 0.1–1)
BILIRUB UR QL STRIP: NEGATIVE
BUN SERPL-MCNC: 6 MG/DL (ref 6–20)
CALCIUM SERPL-MCNC: 9.1 MG/DL (ref 8.7–10.5)
CHLORIDE SERPL-SCNC: 104 MMOL/L (ref 95–110)
CLARITY UR: ABNORMAL
CO2 SERPL-SCNC: 20 MMOL/L (ref 23–29)
COLOR UR: ABNORMAL
CREAT SERPL-MCNC: 0.6 MG/DL (ref 0.5–1.4)
DIFFERENTIAL METHOD: NORMAL
EOSINOPHIL # BLD AUTO: 0.1 K/UL (ref 0–0.5)
EOSINOPHIL NFR BLD: 1.1 % (ref 0–8)
ERYTHROCYTE [DISTWIDTH] IN BLOOD BY AUTOMATED COUNT: 13 % (ref 11.5–14.5)
EST. GFR  (AFRICAN AMERICAN): >60 ML/MIN/1.73 M^2
EST. GFR  (NON AFRICAN AMERICAN): >60 ML/MIN/1.73 M^2
GLUCOSE SERPL-MCNC: 61 MG/DL (ref 70–110)
GLUCOSE UR QL STRIP: NEGATIVE
HCG INTACT+B SERPL-ACNC: NORMAL MIU/ML
HCT VFR BLD AUTO: 39.6 % (ref 37–48.5)
HGB BLD-MCNC: 13.2 G/DL (ref 12–16)
HGB UR QL STRIP: ABNORMAL
HYALINE CASTS #/AREA URNS LPF: 0 /LPF
IMM GRANULOCYTES # BLD AUTO: 0.04 K/UL (ref 0–0.04)
IMM GRANULOCYTES NFR BLD AUTO: 0.5 % (ref 0–0.5)
KETONES UR QL STRIP: ABNORMAL
LEUKOCYTE ESTERASE UR QL STRIP: ABNORMAL
LYMPHOCYTES # BLD AUTO: 2 K/UL (ref 1–4.8)
LYMPHOCYTES NFR BLD: 22 % (ref 18–48)
MCH RBC QN AUTO: 28 PG (ref 27–31)
MCHC RBC AUTO-ENTMCNC: 33.3 G/DL (ref 32–36)
MCV RBC AUTO: 84 FL (ref 82–98)
MICROSCOPIC COMMENT: ABNORMAL
MONOCYTES # BLD AUTO: 0.5 K/UL (ref 0.3–1)
MONOCYTES NFR BLD: 5.4 % (ref 4–15)
NEUTROPHILS # BLD AUTO: 6.3 K/UL (ref 1.8–7.7)
NEUTROPHILS NFR BLD: 70.5 % (ref 38–73)
NITRITE UR QL STRIP: NEGATIVE
NRBC BLD-RTO: 0 /100 WBC
PH UR STRIP: 7 [PH] (ref 5–8)
PLATELET # BLD AUTO: 265 K/UL (ref 150–450)
PMV BLD AUTO: 9.5 FL (ref 9.2–12.9)
POTASSIUM SERPL-SCNC: 4 MMOL/L (ref 3.5–5.1)
PROT SERPL-MCNC: 6.9 G/DL (ref 6–8.4)
PROT UR QL STRIP: ABNORMAL
RBC # BLD AUTO: 4.72 M/UL (ref 4–5.4)
RBC #/AREA URNS HPF: >100 /HPF (ref 0–4)
SODIUM SERPL-SCNC: 136 MMOL/L (ref 136–145)
SP GR UR STRIP: 1.03 (ref 1–1.03)
SQUAMOUS #/AREA URNS HPF: 3 /HPF
URN SPEC COLLECT METH UR: ABNORMAL
UROBILINOGEN UR STRIP-ACNC: ABNORMAL EU/DL
WBC # BLD AUTO: 8.86 K/UL (ref 3.9–12.7)
WBC #/AREA URNS HPF: 30 /HPF (ref 0–5)

## 2022-06-24 PROCEDURE — 87086 URINE CULTURE/COLONY COUNT: CPT | Performed by: EMERGENCY MEDICINE

## 2022-06-24 PROCEDURE — 84702 CHORIONIC GONADOTROPIN TEST: CPT | Performed by: EMERGENCY MEDICINE

## 2022-06-24 PROCEDURE — 81000 URINALYSIS NONAUTO W/SCOPE: CPT | Performed by: EMERGENCY MEDICINE

## 2022-06-24 PROCEDURE — 80053 COMPREHEN METABOLIC PANEL: CPT | Performed by: EMERGENCY MEDICINE

## 2022-06-24 PROCEDURE — 87088 URINE BACTERIA CULTURE: CPT | Performed by: EMERGENCY MEDICINE

## 2022-06-24 PROCEDURE — 87077 CULTURE AEROBIC IDENTIFY: CPT | Performed by: EMERGENCY MEDICINE

## 2022-06-24 PROCEDURE — 99284 EMERGENCY DEPT VISIT MOD MDM: CPT

## 2022-06-24 PROCEDURE — 87186 SC STD MICRODIL/AGAR DIL: CPT | Performed by: EMERGENCY MEDICINE

## 2022-06-24 PROCEDURE — 85025 COMPLETE CBC W/AUTO DIFF WBC: CPT | Performed by: EMERGENCY MEDICINE

## 2022-06-24 NOTE — TELEPHONE ENCOUNTER
Reason for Disposition   Caller has already spoken with another triager and has no further questions    Additional Information   Negative: Caller has already spoken with the PCP (or office), and has no further questions    Protocols used: NO CONTACT OR DUPLICATE CONTACT CALL-A-OH  pt spoke with another triager.

## 2022-06-24 NOTE — TELEPHONE ENCOUNTER
Pt reports having a miscarriage, was seen in ER this morning but having worsening lower abdominal pain, rates pain as severe, also c/o diarrhea & vomiting. Advised pt she needs to go to ER now per protocol & another adult should drive her, verbalized understanding.    Reason for Disposition   SEVERE abdominal pain    Additional Information   Negative: Shock suspected (e.g., cold/pale/clammy skin, too weak to stand, low BP, rapid pulse)   Negative: Difficult to awaken or acting confused (e.g., disoriented, slurred speech)   Negative: Passed out (i.e., lost consciousness, collapsed and was not responding)   Negative: Sounds like a life-threatening emergency to the triager    Protocols used:  - THREATENED MISCARRIAGE FOLLOW-UP CALL-A-OH

## 2022-06-24 NOTE — ED PROVIDER NOTES
SCRIBE #1 NOTE: IEstephanie, am scribing for, and in the presence of, Krishna Flower MD. I have scribed the entire note.      History      Chief Complaint   Patient presents with    Vaginal Bleeding     Pt states she is 9 weeks preg and started bleeding yesterday        Review of patient's allergies indicates:   Allergen Reactions    Penicillins     Meperidine Itching        HPI   HPI    2022, 8:48 AM   History obtained from the patient      History of Present Illness: Norma De La Garza is a 32 y.o. female patient who is 9 weeks pregnant with a PMHx of DVT, hepatitis B carrier, and hepatitis C who presents to the Emergency Department for vaginal bleeding which onset gradually yesterday and worsened this morning. Yesterday, the pt was seen by Dr. Arredondo (OB/GYN) for this complaint. During her appointment yesterday, she reports that the baby did not have a heart beat on the US. Today, the pt reports that she came to the ED for an evaluation because she started to have blood clots in the vaginal bleeding. Symptoms are constant and moderate in severity. No mitigating or exacerbating factors reported. No associated sxs reported. Patient denies any abdominal pain, fever, chills, N/V/D, SOB, CP, weakness, and all other sxs at this time. No further complaints or concerns at this time.         Arrival mode: Personal vehicle    PCP: Ron Mckinnon MD       Past Medical History:  Past Medical History:   Diagnosis Date    Abnormal Pap smear of cervix     Deep vein thrombosis     Encounter for blood transfusion     Hepatitis B carrier     Hepatitis C        Past Surgical History:  Past Surgical History:   Procedure Laterality Date     SECTION      CONIZATION OF CERVIX USING LOOP ELECTROSURGICAL EXCISION PROCEDURE (LEEP) N/A 2022    Procedure: LEEP CONIZATION, CERVIX;  Surgeon: DESTINY Mariee MD;  Location: [facility] OR;  Service: OB/GYN;  Laterality: N/A;    DILATION AND CURETTAGE OF  UTERUS      jaw reconstruction  2008    TONSILLECTOMY           Family History:  No family history on file.    Social History:  Social History     Tobacco Use    Smoking status: Former Smoker     Packs/day: 1.00     Types: Cigarettes     Quit date: 2022     Years since quittin.1    Smokeless tobacco: Current User   Substance and Sexual Activity    Alcohol use: No    Drug use: Yes     Comment: Heroine    Sexual activity: Yes     Partners: Male       ROS   Review of Systems   Constitutional: Negative for chills and fever.   HENT: Negative for sore throat.    Respiratory: Negative for shortness of breath.    Cardiovascular: Negative for chest pain.   Gastrointestinal: Negative for abdominal pain, diarrhea, nausea and vomiting.   Genitourinary: Positive for vaginal bleeding. Negative for dysuria.   Musculoskeletal: Negative for back pain.   Skin: Negative for rash.   Neurological: Negative for weakness.   Hematological: Does not bruise/bleed easily.   All other systems reviewed and are negative.    Physical Exam      Initial Vitals [22 0812]   BP Pulse Resp Temp SpO2   119/67 75 18 98 °F (36.7 °C) 100 %      MAP       --          Physical Exam  Nursing Notes and Vital Signs Reviewed.  Constitutional: Patient is in no acute distress. Well-developed and well-nourished.  Head: Atraumatic. Normocephalic.  Eyes: PERRL. EOM intact. Conjunctivae are not pale. No scleral icterus.  ENT: Mucous membranes are moist. Oropharynx is clear and symmetric.    Neck: Supple. Full ROM. No lymphadenopathy.  Cardiovascular: Regular rate. Regular rhythm. No murmurs, rubs, or gallops. Distal pulses are 2+ and symmetric.  Pulmonary/Chest: No respiratory distress. Clear to auscultation bilaterally. No wheezing or rales.  Abdominal: Soft and non-distended.  There is no tenderness.  No rebound, guarding, or rigidity.   Musculoskeletal: Moves all extremities. No obvious deformities. No edema.  Skin: Warm and  "dry.  Neurological:  Alert, awake, and appropriate.  Normal speech.  No acute focal neurological deficits are appreciated.  Psychiatric: Normal affect. Good eye contact. Appropriate in content.  Pelvic: A female chaperone was present for this examination. Nl external inspection. No lesions or abnormalities were visible on the labia majora or minora. There is a mild amount of blood in the vaginal vault. Cervical os is closed. There is no CMT. No discharge. No adnexal tenderness. No adnexal masses.    ED Course    Procedures  ED Vital Signs:  Vitals:    06/24/22 0812 06/24/22 1035   BP: 119/67 118/70   Pulse: 75 74   Resp: 18 20   Temp: 98 °F (36.7 °C) 98.2 °F (36.8 °C)   TempSrc: Oral Oral   SpO2: 100% 100%   Weight: 51.7 kg (114 lb)    Height: 5' 4" (1.626 m)        Abnormal Lab Results:  Labs Reviewed   COMPREHENSIVE METABOLIC PANEL - Abnormal; Notable for the following components:       Result Value    CO2 20 (*)     Glucose 61 (*)     All other components within normal limits   URINALYSIS, REFLEX TO URINE CULTURE - Abnormal; Notable for the following components:    Color, UA Orange (*)     Appearance, UA Hazy (*)     Protein, UA 1+ (*)     Ketones, UA 1+ (*)     Occult Blood UA 3+ (*)     Urobilinogen, UA 2.0-3.0 (*)     Leukocytes, UA 3+ (*)     All other components within normal limits    Narrative:     Specimen Source->Urine   URINALYSIS MICROSCOPIC - Abnormal; Notable for the following components:    RBC, UA >100 (*)     WBC, UA 30 (*)     Bacteria Few (*)     All other components within normal limits    Narrative:     Specimen Source->Urine   CULTURE, URINE   CBC W/ AUTO DIFFERENTIAL   HCG, QUANTITATIVE        All Lab Results:  Results for orders placed or performed during the hospital encounter of 06/24/22   Comprehensive Metabolic Panel   Result Value Ref Range    Sodium 136 136 - 145 mmol/L    Potassium 4.0 3.5 - 5.1 mmol/L    Chloride 104 95 - 110 mmol/L    CO2 20 (L) 23 - 29 mmol/L    Glucose 61 (L) 70 " - 110 mg/dL    BUN 6 6 - 20 mg/dL    Creatinine 0.6 0.5 - 1.4 mg/dL    Calcium 9.1 8.7 - 10.5 mg/dL    Total Protein 6.9 6.0 - 8.4 g/dL    Albumin 3.7 3.5 - 5.2 g/dL    Total Bilirubin 0.6 0.1 - 1.0 mg/dL    Alkaline Phosphatase 67 55 - 135 U/L    AST 22 10 - 40 U/L    ALT 34 10 - 44 U/L    Anion Gap 12 8 - 16 mmol/L    eGFR if African American >60 >60 mL/min/1.73 m^2    eGFR if non African American >60 >60 mL/min/1.73 m^2   CBC Auto Differential   Result Value Ref Range    WBC 8.86 3.90 - 12.70 K/uL    RBC 4.72 4.00 - 5.40 M/uL    Hemoglobin 13.2 12.0 - 16.0 g/dL    Hematocrit 39.6 37.0 - 48.5 %    MCV 84 82 - 98 fL    MCH 28.0 27.0 - 31.0 pg    MCHC 33.3 32.0 - 36.0 g/dL    RDW 13.0 11.5 - 14.5 %    Platelets 265 150 - 450 K/uL    MPV 9.5 9.2 - 12.9 fL    Immature Granulocytes 0.5 0.0 - 0.5 %    Gran # (ANC) 6.3 1.8 - 7.7 K/uL    Immature Grans (Abs) 0.04 0.00 - 0.04 K/uL    Lymph # 2.0 1.0 - 4.8 K/uL    Mono # 0.5 0.3 - 1.0 K/uL    Eos # 0.1 0.0 - 0.5 K/uL    Baso # 0.04 0.00 - 0.20 K/uL    nRBC 0 0 /100 WBC    Gran % 70.5 38.0 - 73.0 %    Lymph % 22.0 18.0 - 48.0 %    Mono % 5.4 4.0 - 15.0 %    Eosinophil % 1.1 0.0 - 8.0 %    Basophil % 0.5 0.0 - 1.9 %    Differential Method Automated    Urinalysis, Reflex to Urine Culture Urine, Clean Catch    Specimen: Urine   Result Value Ref Range    Specimen UA Urine, Clean Catch     Color, UA Orange (A) Yellow, Straw, Aubree    Appearance, UA Hazy (A) Clear    pH, UA 7.0 5.0 - 8.0    Specific Gravity, UA 1.030 1.005 - 1.030    Protein, UA 1+ (A) Negative    Glucose, UA Negative Negative    Ketones, UA 1+ (A) Negative    Bilirubin (UA) Negative Negative    Occult Blood UA 3+ (A) Negative    Nitrite, UA Negative Negative    Urobilinogen, UA 2.0-3.0 (A) <2.0 EU/dL    Leukocytes, UA 3+ (A) Negative   HCG, Quantitative   Result Value Ref Range    HCG Quant 20039 See Text mIU/mL   Urinalysis Microscopic   Result Value Ref Range    RBC, UA >100 (H) 0 - 4 /hpf    WBC, UA 30 (H) 0 -  5 /hpf    Bacteria Few (A) None-Occ /hpf    Squam Epithel, UA 3 /hpf    Hyaline Casts, UA 0 0-1/lpf /lpf    Microscopic Comment SEE COMMENT          Imaging Results:  Imaging Results    None                 The Emergency Provider reviewed the vital signs and test results, which are outlined above.    ED Discussion     10:33 AM: Reassessed pt at this time. Discussed with pt all pertinent ED information and results. Discussed pt dx and plan of tx. Gave pt all f/u and return to the ED instructions. All questions and concerns were addressed at this time. Pt expresses understanding of information and instructions, and is comfortable with plan to discharge. Pt is stable for discharge.    I discussed with patient and/or family/caretaker that evaluation in the ED does not suggest any emergent or life threatening medical conditions requiring immediate intervention beyond what was provided in the ED, and I believe patient is safe for discharge.  Regardless, an unremarkable evaluation in the ED does not preclude the development or presence of a serious of life threatening condition. As such, patient was instructed to return immediately for any worsening or change in current symptoms.           ED Medication(s):  Medications - No data to display     Follow-up Information     OB/GYN.                       Discharge Medication List as of 6/24/2022 10:31 AM            Medical Decision Making    Medical Decision Making:   Clinical Tests:   Lab Tests: Ordered and Reviewed           Scribe Attestation:   Scribe #1: I performed the above scribed service and the documentation accurately describes the services I performed. I attest to the accuracy of the note.    Attending:   Physician Attestation Statement for Scribe #1: I, Krishna Flower MD, personally performed the services described in this documentation, as scribed by Estephanie Montelongo, in my presence, and it is both accurate and complete.          Clinical Impression        ICD-10-CM ICD-9-CM   1. Miscarriage  O03.9 634.90       Disposition:   Disposition: Discharged  Condition: Stable         Krishna Flower MD  06/24/22 1045

## 2022-06-24 NOTE — Clinical Note
"Norma Sharmayaneth De La Garza was seen and treated in our emergency department on 6/24/2022.  She may return to work on 06/25/2022.       If you have any questions or concerns, please don't hesitate to call.      Hanny JOHNSTON    "

## 2022-06-25 ENCOUNTER — LAB VISIT (OUTPATIENT)
Dept: LAB | Facility: HOSPITAL | Age: 32
End: 2022-06-25
Attending: OBSTETRICS & GYNECOLOGY
Payer: MEDICAID

## 2022-06-25 DIAGNOSIS — O20.0 THREATENED ABORTION: ICD-10-CM

## 2022-06-25 LAB
C TRACH DNA SPEC QL NAA+PROBE: NOT DETECTED
HCG INTACT+B SERPL-ACNC: 6149 MIU/ML
N GONORRHOEA DNA SPEC QL NAA+PROBE: NOT DETECTED

## 2022-06-25 PROCEDURE — 36415 COLL VENOUS BLD VENIPUNCTURE: CPT | Performed by: OBSTETRICS & GYNECOLOGY

## 2022-06-25 PROCEDURE — 84702 CHORIONIC GONADOTROPIN TEST: CPT | Performed by: OBSTETRICS & GYNECOLOGY

## 2022-06-27 LAB — BACTERIA UR CULT: ABNORMAL

## 2022-06-29 ENCOUNTER — TELEPHONE (OUTPATIENT)
Dept: EMERGENCY MEDICINE | Facility: HOSPITAL | Age: 32
End: 2022-06-29
Payer: MEDICAID

## 2022-06-30 RX ORDER — CEFDINIR 300 MG/1
300 CAPSULE ORAL 2 TIMES DAILY
Qty: 6 CAPSULE | Refills: 0 | Status: SHIPPED | OUTPATIENT
Start: 2022-06-30 | End: 2022-07-04

## 2022-06-30 NOTE — TELEPHONE ENCOUNTER
Patient returned call, she was informed of the need for a/b therapy.  Reactions to penicillin have been mild, nausea and itching.  [facility] above.  She will f/u with her OBGYN.  Phoned in to [facility] in [place].

## 2022-07-01 LAB
BLD GP AB SCN TITR SERPL: NORMAL {TITER}
BLOOD GROUP ANTIBODIES SERPL: NORMAL

## 2022-07-05 NOTE — PROGRESS NOTES
Norma De La Garza  complains of amenorrhea.  Patient's last menstrual period was 04/15/2022.. UPT is Positive.  She is taking a PNV.  She denies nausea/vomiting.    Past Medical History:   Diagnosis Date    Abnormal Pap smear of cervix     Deep vein thrombosis     Encounter for blood transfusion     Hepatitis B carrier     Hepatitis C      Past Surgical History:   Procedure Laterality Date     SECTION      CONIZATION OF CERVIX USING LOOP ELECTROSURGICAL EXCISION PROCEDURE (LEEP) N/A 2022    Procedure: LEEP CONIZATION, CERVIX;  Surgeon: DESTINY Mariee MD;  Location: [facility];  Service: OB/GYN;  Laterality: N/A;    DILATION AND CURETTAGE OF UTERUS      jaw reconstruction      TONSILLECTOMY       History reviewed. No pertinent family history.  Review of patient's allergies indicates:   Allergen Reactions    Penicillins     Meperidine Itching     Social History     Socioeconomic History    Marital status: Legally    Tobacco Use    Smoking status: Former Smoker     Packs/day: 1.00     Types: Cigarettes     Quit date: 2022     Years since quittin.1    Smokeless tobacco: Current User   Substance and Sexual Activity    Alcohol use: No    Drug use: Yes     Comment: Heroine    Sexual activity: Yes     Partners: Male       ROS:  GENERAL: No fever, chills, fatigability or weight loss.  VULVAR: No pain, no lesions and no itching.  VAGINAL: No relaxation, no itching, no discharge, no abnormal bleeding and no lesions.  ABDOMEN: No abdominal pain. Denies nausea. Denies vomiting. No diarrhea. No constipation  BREAST: Denies pain. No lumps. No discharge.  URINARY: No incontinence, no nocturia, no frequency and no dysuria.  CARDIOVASCULAR: No chest pain. No shortness of breath. No leg cramps.  NEUROLOGICAL: no headaches. No vision changes.      Vitals:    06/15/22 0914   BP: 100/60     GENERAL: healthy, alert, no distress, cooperative      Norma was seen today for  possible pregnancy.    Diagnoses and all orders for this visit:    Positive pregnancy test  -     Rubella Antibody, IgG; Future  -     HIV 1/2 Ag/Ab (4th Gen); Future  -     RPR; Future  -     Hemoglobin Electrophoresis,Hgb A2 Joel.; Future  -     Cancel: C. trachomatis/N. gonorrhoeae by AMP DNA  -     Type & Screen - Ob Profile; Future  -     CBC Auto Differential; Future  -     Urine culture  -     Hepatitis Panel, Acute; Future    Amenorrhea  -     POCT urine pregnancy    Uterine size date discrepancy pregnancy, first trimester  -     US OB/GYN Procedure ([facility]); Future    Acute deep vein thrombosis (DVT) of proximal vein of right lower extremity  -     Ambulatory referral/consult to Hematology / Oncology; Future  -     APTT; Future  -     PROTIME-INR; Future  -     FIBRINOGEN; Future    Hepatitis B carrier        Pt to see MD for Initial visit  Pt high risk and needs higher level of care

## 2022-07-06 ENCOUNTER — TELEPHONE (OUTPATIENT)
Dept: HEMATOLOGY/ONCOLOGY | Facility: CLINIC | Age: 32
End: 2022-07-06
Payer: MEDICAID

## 2022-07-22 ENCOUNTER — TELEPHONE (OUTPATIENT)
Dept: HEMATOLOGY/ONCOLOGY | Facility: CLINIC | Age: 32
End: 2022-07-22
Payer: MEDICAID

## 2022-07-22 ENCOUNTER — TELEPHONE (OUTPATIENT)
Dept: OBSTETRICS AND GYNECOLOGY | Facility: CLINIC | Age: 32
End: 2022-07-22
Payer: MEDICAID

## 2022-07-22 NOTE — TELEPHONE ENCOUNTER
----- Message from Destinee Strong LPN sent at 7/22/2022 11:46 AM CDT -----  Good morning, pt no showed her Hematology appointment.  Does she still need to seen?

## 2022-07-25 ENCOUNTER — TELEPHONE (OUTPATIENT)
Dept: HEMATOLOGY/ONCOLOGY | Facility: CLINIC | Age: 32
End: 2022-07-25
Payer: MEDICAID

## 2022-07-25 NOTE — TELEPHONE ENCOUNTER
Spoke to patient Hematology appointment rescheduled per request next available at the [place].  Appointment notice mailed.

## 2022-12-02 PROBLEM — Z32.00 ENCOUNTER FOR CONFIRMATION OF PREGNANCY TEST RESULT WITH PHYSICAL EXAMINATION: Status: ACTIVE | Noted: 2022-12-02

## 2022-12-05 ENCOUNTER — LAB VISIT (OUTPATIENT)
Dept: LAB | Facility: HOSPITAL | Age: 32
End: 2022-12-05
Payer: MEDICAID

## 2022-12-05 ENCOUNTER — OFFICE VISIT (OUTPATIENT)
Dept: OBSTETRICS AND GYNECOLOGY | Facility: CLINIC | Age: 32
End: 2022-12-05
Payer: MEDICAID

## 2022-12-05 VITALS
DIASTOLIC BLOOD PRESSURE: 68 MMHG | SYSTOLIC BLOOD PRESSURE: 102 MMHG | BODY MASS INDEX: 18.96 KG/M2 | WEIGHT: 110.44 LBS

## 2022-12-05 DIAGNOSIS — Z32.01 POSITIVE PREGNANCY TEST: ICD-10-CM

## 2022-12-05 DIAGNOSIS — N96 HABITUAL ABORTER: ICD-10-CM

## 2022-12-05 DIAGNOSIS — Z86.79 HISTORY OF ENDOCARDITIS: ICD-10-CM

## 2022-12-05 DIAGNOSIS — Z98.890 HISTORY OF LOOP ELECTRICAL EXCISION PROCEDURE (LEEP): ICD-10-CM

## 2022-12-05 DIAGNOSIS — I82.4Y1 ACUTE DEEP VEIN THROMBOSIS (DVT) OF PROXIMAL VEIN OF RIGHT LOWER EXTREMITY: ICD-10-CM

## 2022-12-05 DIAGNOSIS — B18.2 CHRONIC HEPATITIS C WITHOUT HEPATIC COMA: ICD-10-CM

## 2022-12-05 DIAGNOSIS — Z87.891 FORMER SMOKER: ICD-10-CM

## 2022-12-05 DIAGNOSIS — O34.219 HISTORY OF CESAREAN DELIVERY AFFECTING PREGNANCY: ICD-10-CM

## 2022-12-05 DIAGNOSIS — O26.841 UTERINE SIZE-DATE DISCREPANCY IN FIRST TRIMESTER: ICD-10-CM

## 2022-12-05 DIAGNOSIS — R76.8 POSITIVE COOMBS TEST: ICD-10-CM

## 2022-12-05 DIAGNOSIS — F33.2 SEVERE EPISODE OF RECURRENT MAJOR DEPRESSIVE DISORDER, WITHOUT PSYCHOTIC FEATURES: ICD-10-CM

## 2022-12-05 DIAGNOSIS — Z32.01 POSITIVE PREGNANCY TEST: Primary | ICD-10-CM

## 2022-12-05 PROBLEM — F41.9 ANXIETY: Status: ACTIVE | Noted: 2022-12-05

## 2022-12-05 PROBLEM — F33.9 RECURRENT MAJOR DEPRESSIVE DISORDER: Status: ACTIVE | Noted: 2022-12-05

## 2022-12-05 PROBLEM — F31.9 BIPOLAR 1 DISORDER: Status: ACTIVE | Noted: 2022-12-05

## 2022-12-05 LAB
ABO + RH BLD: ABNORMAL
AMPHET+METHAMPHET UR QL: NEGATIVE
BARBITURATES UR QL SCN>200 NG/ML: NEGATIVE
BASOPHILS # BLD AUTO: 0.03 K/UL (ref 0–0.2)
BASOPHILS NFR BLD: 0.4 % (ref 0–1.9)
BENZODIAZ UR QL SCN>200 NG/ML: NEGATIVE
BLD GP AB SCN CELLS X3 SERPL QL: ABNORMAL
BLD GP AB SCN TITR SERPL: 1 {TITER}
BLD GP AB SCN TITR SERPL: 2 {TITER}
BLOOD GROUP ANTIBODIES SERPL: NORMAL
BZE UR QL SCN: NEGATIVE
CANNABINOIDS UR QL SCN: NEGATIVE
CREAT UR-MCNC: 119 MG/DL (ref 15–325)
DIFFERENTIAL METHOD: NORMAL
EOSINOPHIL # BLD AUTO: 0.3 K/UL (ref 0–0.5)
EOSINOPHIL NFR BLD: 3.6 % (ref 0–8)
ERYTHROCYTE [DISTWIDTH] IN BLOOD BY AUTOMATED COUNT: 13.5 % (ref 11.5–14.5)
HAV IGM SERPL QL IA: ABNORMAL
HBV CORE IGM SERPL QL IA: ABNORMAL
HBV SURFACE AG SERPL QL IA: ABNORMAL
HCT VFR BLD AUTO: 37.6 % (ref 37–48.5)
HCV AB SERPL QL IA: REACTIVE
HGB BLD-MCNC: 12.4 G/DL (ref 12–16)
HIV 1+2 AB+HIV1 P24 AG SERPL QL IA: NORMAL
IMM GRANULOCYTES # BLD AUTO: 0.04 K/UL (ref 0–0.04)
IMM GRANULOCYTES NFR BLD AUTO: 0.5 % (ref 0–0.5)
LYMPHOCYTES # BLD AUTO: 2.2 K/UL (ref 1–4.8)
LYMPHOCYTES NFR BLD: 28.2 % (ref 18–48)
MCH RBC QN AUTO: 29.4 PG (ref 27–31)
MCHC RBC AUTO-ENTMCNC: 33 G/DL (ref 32–36)
MCV RBC AUTO: 89 FL (ref 82–98)
METHADONE UR QL SCN>300 NG/ML: NEGATIVE
MONOCYTES # BLD AUTO: 0.6 K/UL (ref 0.3–1)
MONOCYTES NFR BLD: 7.8 % (ref 4–15)
NEUTROPHILS # BLD AUTO: 4.6 K/UL (ref 1.8–7.7)
NEUTROPHILS NFR BLD: 59.5 % (ref 38–73)
NRBC BLD-RTO: 0 /100 WBC
OPIATES UR QL SCN: NEGATIVE
PCP UR QL SCN>25 NG/ML: NEGATIVE
PLATELET # BLD AUTO: 237 K/UL (ref 150–450)
PMV BLD AUTO: 11.3 FL (ref 9.2–12.9)
RBC # BLD AUTO: 4.22 M/UL (ref 4–5.4)
TOXICOLOGY INFORMATION: NORMAL
WBC # BLD AUTO: 7.79 K/UL (ref 3.9–12.7)

## 2022-12-05 PROCEDURE — 80074 ACUTE HEPATITIS PANEL: CPT

## 2022-12-05 PROCEDURE — 83021 HEMOGLOBIN CHROMOTOGRAPHY: CPT

## 2022-12-05 PROCEDURE — 3078F PR MOST RECENT DIASTOLIC BLOOD PRESSURE < 80 MM HG: ICD-10-PCS | Mod: CPTII,,,

## 2022-12-05 PROCEDURE — 3078F DIAST BP <80 MM HG: CPT | Mod: CPTII,,,

## 2022-12-05 PROCEDURE — 81514 NFCT DS BV&VAGINITIS DNA ALG: CPT

## 2022-12-05 PROCEDURE — 87491 CHLMYD TRACH DNA AMP PROBE: CPT

## 2022-12-05 PROCEDURE — 99203 PR OFFICE/OUTPT VISIT, NEW, LEVL III, 30-44 MIN: ICD-10-PCS | Mod: S$PBB,TH,,

## 2022-12-05 PROCEDURE — 99999 PR PBB SHADOW E&M-EST. PATIENT-LVL III: ICD-10-PCS | Mod: PBBFAC,,,

## 2022-12-05 PROCEDURE — 87389 HIV-1 AG W/HIV-1&-2 AB AG IA: CPT

## 2022-12-05 PROCEDURE — 3074F PR MOST RECENT SYSTOLIC BLOOD PRESSURE < 130 MM HG: ICD-10-PCS | Mod: CPTII,,,

## 2022-12-05 PROCEDURE — 86886 COOMBS TEST INDIRECT TITER: CPT

## 2022-12-05 PROCEDURE — 3008F BODY MASS INDEX DOCD: CPT | Mod: CPTII,,,

## 2022-12-05 PROCEDURE — 1159F PR MEDICATION LIST DOCUMENTED IN MEDICAL RECORD: ICD-10-PCS | Mod: CPTII,,,

## 2022-12-05 PROCEDURE — 86592 SYPHILIS TEST NON-TREP QUAL: CPT

## 2022-12-05 PROCEDURE — 1159F MED LIST DOCD IN RCRD: CPT | Mod: CPTII,,,

## 2022-12-05 PROCEDURE — 99213 OFFICE O/P EST LOW 20 MIN: CPT | Mod: PBBFAC,TH

## 2022-12-05 PROCEDURE — 3074F SYST BP LT 130 MM HG: CPT | Mod: CPTII,,,

## 2022-12-05 PROCEDURE — 87591 N.GONORRHOEAE DNA AMP PROB: CPT

## 2022-12-05 PROCEDURE — 85025 COMPLETE CBC W/AUTO DIFF WBC: CPT

## 2022-12-05 PROCEDURE — 99999 PR PBB SHADOW E&M-EST. PATIENT-LVL III: CPT | Mod: PBBFAC,,,

## 2022-12-05 PROCEDURE — 87086 URINE CULTURE/COLONY COUNT: CPT

## 2022-12-05 PROCEDURE — 86850 RBC ANTIBODY SCREEN: CPT

## 2022-12-05 PROCEDURE — 3008F PR BODY MASS INDEX (BMI) DOCUMENTED: ICD-10-PCS | Mod: CPTII,,,

## 2022-12-05 PROCEDURE — 80307 DRUG TEST PRSMV CHEM ANLYZR: CPT

## 2022-12-05 PROCEDURE — 36415 COLL VENOUS BLD VENIPUNCTURE: CPT

## 2022-12-05 PROCEDURE — 86870 RBC ANTIBODY IDENTIFICATION: CPT

## 2022-12-05 PROCEDURE — 99203 OFFICE O/P NEW LOW 30 MIN: CPT | Mod: S$PBB,TH,,

## 2022-12-05 PROCEDURE — 86762 RUBELLA ANTIBODY: CPT
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
## 2022-12-06 PROBLEM — Z87.891 FORMER SMOKER: Status: ACTIVE | Noted: 2022-12-06

## 2022-12-06 PROBLEM — Z32.00 ENCOUNTER FOR CONFIRMATION OF PREGNANCY TEST RESULT WITH PHYSICAL EXAMINATION: Status: RESOLVED | Noted: 2022-12-02 | Resolved: 2022-12-06

## 2022-12-06 PROBLEM — R76.8 POSITIVE COOMBS TEST: Status: ACTIVE | Noted: 2022-12-06

## 2022-12-06 LAB
C TRACH DNA SPEC QL NAA+PROBE: NOT DETECTED
N GONORRHOEA DNA SPEC QL NAA+PROBE: NOT DETECTED
RPR SER QL: NORMAL
RUBV IGG SER-ACNC: 10.7 IU/ML
RUBV IGG SER-IMP: REACTIVE

## 2022-12-06 RX ORDER — ENOXAPARIN SODIUM 100 MG/ML
40 INJECTION SUBCUTANEOUS EVERY 12 HOURS
Qty: 24 ML | Refills: 7 | Status: SHIPPED | OUTPATIENT
Start: 2022-12-06 | End: 2023-01-23

## 2022-12-07 ENCOUNTER — PATIENT MESSAGE (OUTPATIENT)
Dept: OBSTETRICS AND GYNECOLOGY | Facility: CLINIC | Age: 32
End: 2022-12-07
Payer: MEDICAID

## 2022-12-07 LAB
BACTERIA UR CULT: NORMAL
BACTERIAL VAGINOSIS DNA: POSITIVE
CANDIDA GLABRATA DNA: NEGATIVE
CANDIDA KRUSEI DNA: NEGATIVE
CANDIDA RRNA VAG QL PROBE: NEGATIVE
HGB A2 MFR BLD HPLC: 2.9 % (ref 2.2–3.2)
HGB FRACT BLD ELPH-IMP: NORMAL
HGB FRACT BLD ELPH-IMP: NORMAL
T VAGINALIS RRNA GENITAL QL PROBE: POSITIVE

## 2022-12-08 ENCOUNTER — PATIENT MESSAGE (OUTPATIENT)
Dept: OBSTETRICS AND GYNECOLOGY | Facility: HOSPITAL | Age: 32
End: 2022-12-08
Payer: MEDICAID

## 2022-12-08 DIAGNOSIS — O23.591 TRICHOMONAL VAGINITIS IN PREGNANCY IN FIRST TRIMESTER: Primary | ICD-10-CM

## 2022-12-08 DIAGNOSIS — A59.01 TRICHOMONAL VAGINITIS IN PREGNANCY IN FIRST TRIMESTER: Primary | ICD-10-CM

## 2022-12-08 PROBLEM — B96.89 BV (BACTERIAL VAGINOSIS): Status: ACTIVE | Noted: 2022-12-08

## 2022-12-08 PROBLEM — N76.0 BV (BACTERIAL VAGINOSIS): Status: ACTIVE | Noted: 2022-12-08

## 2022-12-08 RX ORDER — METRONIDAZOLE 500 MG/1
2000 TABLET ORAL ONCE
Qty: 4 TABLET | Refills: 0 | Status: SHIPPED | OUTPATIENT
Start: 2022-12-08 | End: 2022-12-08

## 2022-12-21 ENCOUNTER — TELEPHONE (OUTPATIENT)
Dept: OBSTETRICS AND GYNECOLOGY | Facility: CLINIC | Age: 32
End: 2022-12-21
Payer: MEDICAID

## 2022-12-21 NOTE — TELEPHONE ENCOUNTER
----- Message from Shanna Coughlin sent at 12/21/2022  7:10 AM CST -----  Type:  Needs Medical Advice    Who Called: patient  Symptoms (please be specific): not able to urinate, vomiting for last two days , not eating  How long has patient had these symptoms:  2days  Pharmacy name and phone #:  [facility]  Would the patient rather a call back or a response via [facility]? Call back  Best Call Back Number: [phone]  Additional Information: pt is 14wks

## 2022-12-21 NOTE — TELEPHONE ENCOUNTER
Left message for patient to return call to [phone].    Advised patient in message to go to ED for evaluation.

## 2023-01-03 ENCOUNTER — TELEPHONE (OUTPATIENT)
Dept: OBSTETRICS AND GYNECOLOGY | Facility: CLINIC | Age: 33
End: 2023-01-03
Payer: MEDICAID

## 2023-01-03 ENCOUNTER — PATIENT MESSAGE (OUTPATIENT)
Dept: OBSTETRICS AND GYNECOLOGY | Facility: CLINIC | Age: 33
End: 2023-01-03
Payer: MEDICAID

## 2023-01-03 NOTE — TELEPHONE ENCOUNTER
----- Message from Julia Pierre sent at 1/3/2023  9:29 AM CST -----  Contact: self [phone]  Patient called in stating she needs to reschedule her appointment. Please give her a call back at [phone]

## 2023-01-03 NOTE — TELEPHONE ENCOUNTER
----- Message from Marleny Isabel sent at 1/3/2023 12:29 PM CST -----  Contact: Pt  Type:  Sooner Apoointment Request    Caller is requesting a sooner appointment.  Caller declined first available appointment listed below.  Caller will not accept being placed on the waitlist and is requesting a message be sent to doctor.  Name of Caller: pt   When is the first available appointment?   Symptoms: Routine OB/ w/ US   Would the patient rather a call back or a response via [facility]? phone  Best Call Back Number: [phone]  Additional Information: wants an appt earlier than 1/23/ missed her last appt

## 2023-01-11 ENCOUNTER — PROCEDURE VISIT (OUTPATIENT)
Dept: OBSTETRICS AND GYNECOLOGY | Facility: CLINIC | Age: 33
End: 2023-01-11
Payer: MEDICAID

## 2023-01-11 ENCOUNTER — INITIAL PRENATAL (OUTPATIENT)
Dept: OBSTETRICS AND GYNECOLOGY | Facility: CLINIC | Age: 33
End: 2023-01-11
Payer: MEDICAID

## 2023-01-11 VITALS
SYSTOLIC BLOOD PRESSURE: 110 MMHG | DIASTOLIC BLOOD PRESSURE: 60 MMHG | BODY MASS INDEX: 19.41 KG/M2 | WEIGHT: 113.13 LBS

## 2023-01-11 DIAGNOSIS — O21.9 NAUSEA AND VOMITING IN PREGNANCY: ICD-10-CM

## 2023-01-11 DIAGNOSIS — O28.3 ECHOGENIC BOWEL OF FETUS ON PRENATAL ULTRASOUND: ICD-10-CM

## 2023-01-11 DIAGNOSIS — Z36.89 ENCOUNTER FOR ULTRASOUND TO ASSESS FETAL GROWTH: ICD-10-CM

## 2023-01-11 DIAGNOSIS — F19.10 DRUG ABUSE: ICD-10-CM

## 2023-01-11 DIAGNOSIS — I82.4Y1 ACUTE DEEP VEIN THROMBOSIS (DVT) OF PROXIMAL VEIN OF RIGHT LOWER EXTREMITY: ICD-10-CM

## 2023-01-11 DIAGNOSIS — O23.591 TRICHOMONAL VAGINITIS DURING PREGNANCY IN FIRST TRIMESTER: ICD-10-CM

## 2023-01-11 DIAGNOSIS — Z36.89 ENCOUNTER FOR ULTRASOUND TO ASSESS FETAL GROWTH: Primary | ICD-10-CM

## 2023-01-11 DIAGNOSIS — A59.01 TRICHOMONAL VAGINITIS DURING PREGNANCY IN FIRST TRIMESTER: ICD-10-CM

## 2023-01-11 PROCEDURE — 80307 DRUG TEST PRSMV CHEM ANLYZR: CPT | Performed by: MIDWIFE

## 2023-01-11 PROCEDURE — 99999 PR PBB SHADOW E&M-EST. PATIENT-LVL III: CPT | Mod: PBBFAC,,, | Performed by: MIDWIFE

## 2023-01-11 PROCEDURE — 76801 US OB/GYN PROCEDURE (VIEWPOINT): ICD-10-PCS | Mod: 26,S$PBB,, | Performed by: OBSTETRICS & GYNECOLOGY

## 2023-01-11 PROCEDURE — 81001 URINALYSIS AUTO W/SCOPE: CPT | Mod: 59 | Performed by: MIDWIFE

## 2023-01-11 PROCEDURE — 99213 PR OFFICE/OUTPT VISIT, EST, LEVL III, 20-29 MIN: ICD-10-PCS | Mod: TH,S$PBB,, | Performed by: MIDWIFE

## 2023-01-11 PROCEDURE — 76801 OB US < 14 WKS SINGLE FETUS: CPT | Mod: PBBFAC | Performed by: OBSTETRICS & GYNECOLOGY

## 2023-01-11 PROCEDURE — 99999 PR PBB SHADOW E&M-EST. PATIENT-LVL III: ICD-10-PCS | Mod: PBBFAC,,, | Performed by: MIDWIFE

## 2023-01-11 PROCEDURE — 99213 OFFICE O/P EST LOW 20 MIN: CPT | Mod: PBBFAC,TH | Performed by: MIDWIFE

## 2023-01-11 PROCEDURE — 99213 OFFICE O/P EST LOW 20 MIN: CPT | Mod: TH,S$PBB,, | Performed by: MIDWIFE

## 2023-01-11 RX ORDER — PROMETHAZINE HYDROCHLORIDE 25 MG/1
25 TABLET ORAL EVERY 4 HOURS PRN
Qty: 30 TABLET | Refills: 1 | Status: SHIPPED | OUTPATIENT
Start: 2023-01-11 | End: 2023-01-23 | Stop reason: SDUPTHER

## 2023-01-11 NOTE — PROGRESS NOTES
Pt here for NOB visit, has not been seen since early December, US today shows echogenic bowel, will have pt see MFM for targeted US, pt states she is taking Lovenox daily as prescribed, Hem/Onc referral placed, reviewed labs, pt states she has been clean, UDS, UA for trich

## 2023-01-12 ENCOUNTER — TELEPHONE (OUTPATIENT)
Dept: OBSTETRICS AND GYNECOLOGY | Facility: CLINIC | Age: 33
End: 2023-01-12
Payer: MEDICAID

## 2023-01-12 ENCOUNTER — PATIENT MESSAGE (OUTPATIENT)
Dept: HEMATOLOGY/ONCOLOGY | Facility: CLINIC | Age: 33
End: 2023-01-12
Payer: MEDICAID

## 2023-01-12 ENCOUNTER — TELEPHONE (OUTPATIENT)
Dept: HEMATOLOGY/ONCOLOGY | Facility: CLINIC | Age: 33
End: 2023-01-12
Payer: MEDICAID

## 2023-01-12 LAB
AMORPH CRY UR QL COMP ASSIST: NORMAL
AMPHET+METHAMPHET UR QL: NEGATIVE
BACTERIA #/AREA URNS AUTO: NORMAL /HPF
BARBITURATES UR QL SCN>200 NG/ML: NEGATIVE
BENZODIAZ UR QL SCN>200 NG/ML: NEGATIVE
BILIRUB UR QL STRIP: NEGATIVE
BZE UR QL SCN: NEGATIVE
CANNABINOIDS UR QL SCN: NEGATIVE
CLARITY UR REFRACT.AUTO: ABNORMAL
COLOR UR AUTO: YELLOW
CREAT UR-MCNC: 66 MG/DL (ref 15–325)
GLUCOSE UR QL STRIP: NEGATIVE
HGB UR QL STRIP: NEGATIVE
KETONES UR QL STRIP: NEGATIVE
LEUKOCYTE ESTERASE UR QL STRIP: NEGATIVE
METHADONE UR QL SCN>300 NG/ML: NEGATIVE
MICROSCOPIC COMMENT: NORMAL
NITRITE UR QL STRIP: NEGATIVE
OPIATES UR QL SCN: NEGATIVE
PCP UR QL SCN>25 NG/ML: NEGATIVE
PH UR STRIP: 7 [PH] (ref 5–8)
PROT UR QL STRIP: NEGATIVE
RBC #/AREA URNS AUTO: 1 /HPF (ref 0–4)
SP GR UR STRIP: 1.02 (ref 1–1.03)
SQUAMOUS #/AREA URNS AUTO: 3 /HPF
TOXICOLOGY INFORMATION: NORMAL
URN SPEC COLLECT METH UR: ABNORMAL

## 2023-01-12 NOTE — TELEPHONE ENCOUNTER
Left message in reference to Hematology referral from Moose Hendrix CNM.  [facility] message sent.

## 2023-01-12 NOTE — TELEPHONE ENCOUNTER
----- Message from Lucia Dempsey sent at 1/12/2023 11:30 AM CST -----  Contact: [phone]  Pt is calling regarding referring to specialist, pt states no one has gotten back with her . Please call back at [phone] . Thanks/dj

## 2023-01-13 ENCOUNTER — TELEPHONE (OUTPATIENT)
Dept: HEMATOLOGY/ONCOLOGY | Facility: CLINIC | Age: 33
End: 2023-01-13
Payer: MEDICAID

## 2023-01-13 ENCOUNTER — TELEPHONE (OUTPATIENT)
Dept: MATERNAL FETAL MEDICINE | Facility: CLINIC | Age: 33
End: 2023-01-13
Payer: MEDICAID

## 2023-01-13 NOTE — TELEPHONE ENCOUNTER
Phone call to patient to schedule MFM ultrasound and consult. Patient declined appointment at [facility] and agreed to visit with our MFM team at the [place] office on 1/23/23 at 8am.     Pt verbalized understanding of information.

## 2023-01-18 ENCOUNTER — TELEPHONE (OUTPATIENT)
Dept: HEMATOLOGY/ONCOLOGY | Facility: CLINIC | Age: 33
End: 2023-01-18
Payer: MEDICAID

## 2023-01-23 ENCOUNTER — PROCEDURE VISIT (OUTPATIENT)
Dept: OBSTETRICS AND GYNECOLOGY | Facility: CLINIC | Age: 33
End: 2023-01-23
Payer: MEDICAID

## 2023-01-23 ENCOUNTER — ROUTINE PRENATAL (OUTPATIENT)
Dept: OBSTETRICS AND GYNECOLOGY | Facility: CLINIC | Age: 33
End: 2023-01-23
Payer: MEDICAID

## 2023-01-23 VITALS
WEIGHT: 108.94 LBS | SYSTOLIC BLOOD PRESSURE: 119 MMHG | DIASTOLIC BLOOD PRESSURE: 69 MMHG | BODY MASS INDEX: 18.6 KG/M2 | HEIGHT: 64 IN

## 2023-01-23 VITALS — SYSTOLIC BLOOD PRESSURE: 98 MMHG | DIASTOLIC BLOOD PRESSURE: 64 MMHG

## 2023-01-23 DIAGNOSIS — B18.2 CHRONIC HEPATITIS C WITHOUT HEPATIC COMA: ICD-10-CM

## 2023-01-23 DIAGNOSIS — O09.899 HISTORY OF PRETERM DELIVERY, CURRENTLY PREGNANT: ICD-10-CM

## 2023-01-23 DIAGNOSIS — Z86.718 PREVIOUS DEEP VEIN THROMBOSIS (DVT) AFFECTING PREGNANCY IN SECOND TRIMESTER: ICD-10-CM

## 2023-01-23 DIAGNOSIS — O34.219 HISTORY OF CESAREAN DELIVERY, CURRENTLY PREGNANT: ICD-10-CM

## 2023-01-23 DIAGNOSIS — O34.219 HISTORY OF CESAREAN DELIVERY AFFECTING PREGNANCY: ICD-10-CM

## 2023-01-23 DIAGNOSIS — O36.5920 POOR FETAL GROWTH AFFECTING MANAGEMENT OF MOTHER IN SECOND TRIMESTER, SINGLE OR UNSPECIFIED FETUS: ICD-10-CM

## 2023-01-23 DIAGNOSIS — Z86.79 HISTORY OF ENDOCARDITIS: ICD-10-CM

## 2023-01-23 DIAGNOSIS — O99.330 TOBACCO USE IN PREGNANCY, ANTEPARTUM: ICD-10-CM

## 2023-01-23 DIAGNOSIS — Z98.890 HISTORY OF LOOP ELECTRICAL EXCISION PROCEDURE (LEEP): ICD-10-CM

## 2023-01-23 DIAGNOSIS — O09.892 PREVIOUS DEEP VEIN THROMBOSIS (DVT) AFFECTING PREGNANCY IN SECOND TRIMESTER: ICD-10-CM

## 2023-01-23 DIAGNOSIS — O21.9 NAUSEA AND VOMITING IN PREGNANCY: ICD-10-CM

## 2023-01-23 DIAGNOSIS — O09.92 SUPERVISION OF HIGH RISK PREGNANCY IN SECOND TRIMESTER: Primary | ICD-10-CM

## 2023-01-23 DIAGNOSIS — N96 HABITUAL ABORTER: ICD-10-CM

## 2023-01-23 DIAGNOSIS — I82.4Y1 ACUTE DEEP VEIN THROMBOSIS (DVT) OF PROXIMAL VEIN OF RIGHT LOWER EXTREMITY: ICD-10-CM

## 2023-01-23 DIAGNOSIS — O28.3 ECHOGENIC BOWEL OF FETUS ON PRENATAL ULTRASOUND: ICD-10-CM

## 2023-01-23 DIAGNOSIS — O36.1990 MATERNAL RED CELL ALLOIMMUNIZATION, ANTEPARTUM, SINGLE OR UNSPECIFIED FETUS: ICD-10-CM

## 2023-01-23 DIAGNOSIS — O28.3 ABNORMAL FETAL ULTRASOUND: ICD-10-CM

## 2023-01-23 DIAGNOSIS — F41.9 ANXIETY: ICD-10-CM

## 2023-01-23 DIAGNOSIS — F33.2 SEVERE EPISODE OF RECURRENT MAJOR DEPRESSIVE DISORDER, WITHOUT PSYCHOTIC FEATURES: ICD-10-CM

## 2023-01-23 DIAGNOSIS — F19.11 HISTORY OF DRUG ABUSE IN REMISSION: ICD-10-CM

## 2023-01-23 DIAGNOSIS — R76.8 POSITIVE COOMBS TEST: ICD-10-CM

## 2023-01-23 DIAGNOSIS — F31.9 BIPOLAR 1 DISORDER: ICD-10-CM

## 2023-01-23 DIAGNOSIS — O09.299 HISTORY OF INTRAUTERINE GROWTH RESTRICTION IN PRIOR PREGNANCY, CURRENTLY PREGNANT: ICD-10-CM

## 2023-01-23 PROCEDURE — 99213 OFFICE O/P EST LOW 20 MIN: CPT | Mod: PBBFAC,TH,25 | Performed by: OBSTETRICS & GYNECOLOGY

## 2023-01-23 PROCEDURE — 99999 PR PBB SHADOW E&M-EST. PATIENT-LVL III: ICD-10-PCS | Mod: PBBFAC,,, | Performed by: OBSTETRICS & GYNECOLOGY

## 2023-01-23 PROCEDURE — 99213 OFFICE O/P EST LOW 20 MIN: CPT | Mod: TH,S$PBB,, | Performed by: OBSTETRICS & GYNECOLOGY

## 2023-01-23 PROCEDURE — 76811 PR US, OB FETAL EVAL & EXAM, TRANSABDOM,FIRST GESTATION: ICD-10-PCS | Mod: 26,S$PBB,, | Performed by: OBSTETRICS & GYNECOLOGY

## 2023-01-23 PROCEDURE — 99213 PR OFFICE/OUTPT VISIT, EST, LEVL III, 20-29 MIN: ICD-10-PCS | Mod: TH,S$PBB,, | Performed by: OBSTETRICS & GYNECOLOGY

## 2023-01-23 PROCEDURE — 76811 OB US DETAILED SNGL FETUS: CPT | Mod: PBBFAC,PO | Performed by: OBSTETRICS & GYNECOLOGY

## 2023-01-23 PROCEDURE — 99417 PR PROLONGED SVC, OUTPT, W/WO DIRECT PT CONTACT,  EA ADDTL 15 MIN: ICD-10-PCS | Mod: S$PBB,,, | Performed by: OBSTETRICS & GYNECOLOGY

## 2023-01-23 PROCEDURE — 99417 PROLNG OP E/M EACH 15 MIN: CPT | Mod: S$PBB,,, | Performed by: OBSTETRICS & GYNECOLOGY

## 2023-01-23 PROCEDURE — 99205 PR OFFICE/OUTPT VISIT, NEW, LEVL V, 60-74 MIN: ICD-10-PCS | Mod: S$PBB,TH,25, | Performed by: OBSTETRICS & GYNECOLOGY

## 2023-01-23 PROCEDURE — 76811 OB US DETAILED SNGL FETUS: CPT | Mod: 26,S$PBB,, | Performed by: OBSTETRICS & GYNECOLOGY

## 2023-01-23 PROCEDURE — 76817 TRANSVAGINAL US OBSTETRIC: CPT | Mod: 26,S$PBB,, | Performed by: OBSTETRICS & GYNECOLOGY

## 2023-01-23 PROCEDURE — 99999 PR PBB SHADOW E&M-EST. PATIENT-LVL III: CPT | Mod: PBBFAC,,, | Performed by: OBSTETRICS & GYNECOLOGY

## 2023-01-23 PROCEDURE — 76817 PR US, OB, TRANSVAG APPROACH: ICD-10-PCS | Mod: 26,S$PBB,, | Performed by: OBSTETRICS & GYNECOLOGY

## 2023-01-23 PROCEDURE — 76817 TRANSVAGINAL US OBSTETRIC: CPT | Mod: PBBFAC,PO | Performed by: OBSTETRICS & GYNECOLOGY

## 2023-01-23 PROCEDURE — 99205 OFFICE O/P NEW HI 60 MIN: CPT | Mod: S$PBB,TH,25, | Performed by: OBSTETRICS & GYNECOLOGY

## 2023-01-23 RX ORDER — PROMETHAZINE HYDROCHLORIDE 25 MG/1
25 TABLET ORAL EVERY 4 HOURS PRN
Qty: 30 TABLET | Refills: 3 | Status: SHIPPED | OUTPATIENT
Start: 2023-01-23 | End: 2023-12-05

## 2023-01-23 RX ORDER — QUETIAPINE FUMARATE 50 MG/1
50 TABLET, FILM COATED ORAL NIGHTLY
Qty: 30 TABLET | Refills: 11 | Status: SHIPPED | OUTPATIENT
Start: 2023-01-23 | End: 2023-12-05

## 2023-01-23 RX ORDER — ONDANSETRON 4 MG/1
4 TABLET, ORALLY DISINTEGRATING ORAL EVERY 12 HOURS PRN
Qty: 30 TABLET | Refills: 3 | Status: SHIPPED | OUTPATIENT
Start: 2023-01-23 | End: 2023-12-05

## 2023-01-23 RX ORDER — ENOXAPARIN SODIUM 100 MG/ML
40 INJECTION SUBCUTANEOUS DAILY
Qty: 12 ML | Refills: 6 | Status: SHIPPED | OUTPATIENT
Start: 2023-01-23 | End: 2023-09-20

## 2023-01-23 RX ORDER — ASPIRIN 81 MG/1
81 TABLET ORAL DAILY
Qty: 90 TABLET | Refills: 3 | Status: SHIPPED | OUTPATIENT
Start: 2023-01-23 | End: 2023-12-05

## 2023-01-23 RX ORDER — PROGESTERONE 200 MG/1
200 CAPSULE ORAL NIGHTLY
Qty: 30 CAPSULE | Refills: 6 | Status: SHIPPED | OUTPATIENT
Start: 2023-01-23 | End: 2023-04-03

## 2023-01-23 NOTE — PROGRESS NOTES
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 [place].  Due to this, she lost custody of this infant.  Management per primary OB provider      Recurrent major depressive disorder  Patient reports multiple psychiatric disease including depression, bipolar, anxiety.  She follows with [facility] in [place].  She was seeing Dr. Mahmood and Amanda is her counselor.  She was previously on Seroquel, Abilify and Topamax however these were self-discontinued by patient earlier this pregnancy.  Patient reports going through a lot of social issues at this time.  She denies any SI or HI today.    Management per primary OB provider        Patient was counseled that prenatal ultrasound studies have limitations. They do not detect all fetal, genetic, placental, and maternal abnormalities.     Patient's other comorbidites were not addressed in today's visit.  If primary OB provider would like MFM input on these, please feel free to reconsult as clinically indicated.  Otherwise, will defer management to primary OB provider.  Dr. Chin was  called to discuss management of this patient.      FOLLOW UP:   A follow up ultrasound will be made for 4 weeks from today. A follow up MFM MD visit will be made for same day.       The patient was given an opportunity to ask questions about the management of her high risk pregnancy problems. She expressed an understanding of and agreement to the above impression and plan. All questions were answered to her satisfaction.    140 minutes of total time spent on the encounter, which includes face to face time and non-face to face time preparing to see the patient (eg, review of tests), obtaining and/or reviewing separately obtained history, documenting clinical information in the electronic or other health record, independently interpreting results (not separately reported) and communicating results to the patient/family/caregiver, or care coordination (not separately reported).        Twan Damon MD   Maternal-Fetal Medicine      Electronically Signed by Twan Damon January 23, 2023

## 2023-01-23 NOTE — PROGRESS NOTES
Presents today for routine OB visit.  Pt saw Dr. Damon with [facility] who called me and reviewed her case.  He gave detailed and thorough recommendations for various tests and consultations.  See below:  1. Pregnancy:  -pt is having N/V, mainly at night.  Uses phenergan, but still wakes up vomiting.  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 Hx of PPROM/PTL  -cervical length normal today; repeat CL in 2 weeks  -start vaginal prometrium 200mg; Rx sent  4. Hep C  -check CMP and Hep C viral load  -referral placed to Hep C clinic  5. Hx of bacterial endocarditis  -echocardiogram ordered  -referral to cardiology ordered and scheduled  6. Hx of DVT  -occurred andrade pt had abscess on tail bone; was supposed to be on xarelto prior to pregnancy  -now on lovenox; dose of lovenox reviewed; pt needs to be on lovenox 40mg daily (not bid).  I made sure she is aware of this and dose adjusted in the chart  -check labs for APLAS, Factor V Leiden, and [id number] gene mutation  -start aspirin 81mg daily  7. Bipolar disorder  -was on seroquel, topamax, and abilify; stopped all when she became pregnant.  States she is struggling with anxiety.  Denies any hallucinations.  Will resume seroquel 50mg daily.  Rx sent  -is established with a mental health provider and has contact info for that provider at the [facility]  -will connect pt with our nurse navigatory [facility]  8. Anti-FyA and Anti-E antibodies  -repeat T&S and antibody titers today    RTC 2 weeks for routine OB visit and TVUS for cervical length

## 2023-01-23 NOTE — Clinical Note
José Manuel Gold, This is a high risk patient with a long problem list.  We ordered a bunch of labs, echo, and placed several referrals (mainly cardiology and the Hep C clinic).  I'll need your help making sure she makes it to these visits. Thank you so much! ~Dr. VALVERDE

## 2023-01-23 NOTE — Clinical Note
I think I got everything.  The only referral I didn't place yet was the hematology referral.  I'll see what her thrombophilia work-up looks like first, then take care of that at her next visit. Thanks for your help!

## 2023-01-23 NOTE — Clinical Note
Here is her note, sorry for the delay and length. Let me know if you need me to schedule her CL in 2 weeks with us. Thanks!

## 2023-01-24 DIAGNOSIS — Z36.2 ENCOUNTER FOR FOLLOW-UP ULTRASOUND OF FETAL ANATOMY: Primary | ICD-10-CM

## 2023-01-24 PROBLEM — F19.11 HISTORY OF DRUG ABUSE IN REMISSION: Status: ACTIVE | Noted: 2023-01-11

## 2023-01-24 PROBLEM — O99.330 TOBACCO USE IN PREGNANCY, ANTEPARTUM: Status: ACTIVE | Noted: 2022-12-06

## 2023-01-24 PROBLEM — O28.3 ABNORMAL FETAL ULTRASOUND: Status: ACTIVE | Noted: 2023-01-24

## 2023-01-24 PROBLEM — O09.899 HISTORY OF PRETERM DELIVERY, CURRENTLY PREGNANT: Status: ACTIVE | Noted: 2023-01-24

## 2023-01-24 PROBLEM — O09.299 HISTORY OF INTRAUTERINE GROWTH RESTRICTION IN PRIOR PREGNANCY, CURRENTLY PREGNANT: Status: ACTIVE | Noted: 2023-01-24

## 2023-01-24 PROBLEM — O36.1990 MATERNAL RED CELL ALLOIMMUNIZATION, ANTEPARTUM: Status: ACTIVE | Noted: 2022-12-06

## 2023-01-24 NOTE — ASSESSMENT & PLAN NOTE
Patient counseled on cessation of tobacco use and avoidance of second hand smoke inhalation for duration of the pregnancy and postpartum period secondary to the associated fetal/ risks that include the following: spontaneous , placental abruption, low birth weight, oligohydramnios, non-reassuring fetal status, and sudden infant death syndrome (SIDS).  Management per primary OB provider

## 2023-01-24 NOTE — ASSESSMENT & PLAN NOTE
I discussed today's ultrasound in detail with patient.  We discussed the finding of echogenic bowel as well as lagging long bone measurements consistent with micromelia.     Hyperechoic or echogenic fetal bowel is a non-specific ultrasound finding seen in about 1.8% of all fetal anatomy scans.   Although this can be a transient or idiopathic finding in normal fetuses, it can also be an indicator of fetal infection (CMV most commonly), cystic fibrosis, aneuploidy, intraamniotic bleeding, gastrointestinal abnormalities or eventual development of fetal growth restriction. Therefore, testing strategies for each of these conditions were discussed. The testing options discussed included amniocentesis, parental carrier screening for cystic fibrosis, maternal serologic testing for CMV, cell-free DNA screening, and subsequent ultrasound evaluation for fetal growth and surveillance for development of bowel obstruction or meconium peritonitis. The patient denies a recent history of a symptomatic maternal infection concerning for CMV.   We discussed this finding in the setting of likely micromelia.  The concern is increased for possible aneuploidy or other syndromes, including skeletal dysplasias.  The ultrasound remains incomplete due to suboptimal views and so exact etiology and differential is unable to be determined today.  We discussed that there is concern that this can develop into growth restriction although the AC is measuring normal and overall EFW is likely lagging due to long bone measurements.  The patient has not had screening. The differences between a screening and diagnostic test were reviewed with patient. We reviewed amniocentesis as a diagnostic test. The risks, benefits, and limitations of an amniocentesis were discussed. The risk of 1 in 800-1000 for pregnancy loss/miscarriage was discussed. Other possible complications discussed included, but were not limited to, rupture of membranes,  labor,  damage to the cord or fetus, leakage of fluid, vaginal bleeding or infection. I also reviewed the overall risk of no call and sample contamination. The patient was offered an amniocentesis for fetal CMA and infectious studies.  Patient reports that she needs to discuss this with her partner prior to making a decision.  However she would like to proceed with blood work in the meantime.     Recommendations:   Recommend primary OB to order the following labs:   o CMV IgG/IgM, toxoplasmosis IgG/IgM  o Cystic fibrosis mutation panel  o cfDNA screen.    Reviewed amniocentesis - patient will decide at later time   A follow up growth ultrasound in 4 weeks is recommended - scheduled today  o Will attempt to complete ultrasound at that time.  Mode, location and timing of delivery to be determines pending future ultrasounds.

## 2023-01-24 NOTE — ASSESSMENT & PLAN NOTE
Patient reports multiple psychiatric disease including depression, bipolar, anxiety.  She follows with [facility] in [place].  She was seeing Dr. Mahmood and Amanda is her counselor.  She was previously on Seroquel, Abilify and Topamax however these were self-discontinued by patient earlier this pregnancy.  Patient reports going through a lot of social issues at this time.  She denies any SI or HI today.    Management per primary OB provider

## 2023-01-24 NOTE — ASSESSMENT & PLAN NOTE
I counseled the patient regarding the risks of hepatitis C in pregnancy. In general, women chronically infected with hepatitis C have an uneventful pregnancy. In HIV-negative patients, the risk of vertical transmission is approximately 5%. The risk of vertical transmission increases in the presence of HIV co-infection. Pregnancy complications can include fetal growth restriction and low birthweight. Treatment with antiviral medications is not recommended during pregnancy.  Per records, this likely dates back to at least , if not earlier.    Recommendations:   Screen for concurrent HIV infection, hepatitis A and hepatitis B infection, and other sexually transmitted diseases (syphilis, gonorrhea, and chlamydia).  o Vaccinate for hepatitis A and hepatitis B if non-immune   Obtain baseline LFTs and hepatitis C viral load (to be ordered by primary OB provider)   Refer to hepatology for evaluation and consideration of treatment postpartum   Mode of delivery per other obstetric indications.   Avoid invasive fetal procedures intrapartum (early amniotomy, fetal scalp electrode, and episiotomy) unless necessary.   Breastfeeding is not contraindicated (recommend abstain from breastfeeding if nipples are bleeding or cracked).   Evaluation of  by pediatricians after delivery.    Amniocentesis does not appear to increase the risk of vertical transmission, although this is based on limited information. If invasive prenatal diagnosis is undertaken, patient was counseled that date on vertical transmission is reassuring, albeit limited.

## 2023-01-24 NOTE — ASSESSMENT & PLAN NOTE
I reviewed the patient's obstetric history which is remarkable for a previous pregnancy complicated by FGR (per patient report) in 2016.  Records are not available for this pregnancy that was in [place].  Patient reports that the infant was being followed closely due to 'being small' and she was iatrogenically delivered at 32 weeks by  after ultrasound revealed low fluid.  Patient reports that there was thought that she might have been PPROM'd, but also reports the membranes were intact at the time of her .  I am unable to determine if the urgency of delivery was due to nonreassuring fetal heart tones are other etiology.   Other conditions noted during that pregnancy include: drug use during that pregnancy. I counseled the patient regarding the recurrence risk for FGR.  Patient was counseled on modifiable risk factors including tobacco cessation, abstinence from alcohol and drug use, and maternal diet (ensuring appropriate gestational weight gain).     Recommendations:   Fetal growth ultrasounds every 4-6 weeks (per other abnormal ultrasound findings)   Antepartum surveillance is not indicated in absence of FGR diagnosis or other maternal-fetal indications for prenatal testing   Monitor gestational weight gain, encourage prenatal vitamin   Care per other comorbidities.

## 2023-01-24 NOTE — ASSESSMENT & PLAN NOTE
I counseled the patient regarding her obstetric history which is remarkable for a history of spontaneous  birth at 32 weeks in the setting of twins in 2009. Records for this are not available.  She also reports during her pregnancy in [place] in 2016 being followed closely due to her fetus 'being small'. She was iatrogenically delivered at 32 weeks by  after ultrasound revealed low fluid.  Patient reports that there was thought that she might have been PPROM'd, but also reports the membranes were intact at the time of her .  I am unable to determine if the urgency of delivery was due to nonreassuring fetal heart tones are other etiology, but no evidence of spontaneous labor.   In patients with a history of spontaneous  delivery prior to 37 weeks gestation, [facility] currently recommends consideration of the use of progesterone therapy for the prevention of recurrent  birth. We reviewed the evidence regarding the efficacy of progesteroine for reducing the risk of recurrent  birth including the recent data from the PROLONG and EPPPIC trials. Based on these studies, our group recommends consideration of progesterone supplementation. There is increasing evidence supporting the use of alternative forms of progesterone supplementation (ie vaginal progesterone 200 mg qhs) for women have been thoroughly counseled regarding the benefits, risks, costs, and logistics of each form of progesterone supplementation.  We also discussed recommendations for cervical length monitoring biweekly until 22 weeks 6 days gestation as this may also help identify patients in whom cerclage can be considered to reduce the risk of  birth as well.   Patient's CL today was reassuring.     Recommendations from our MFM group at [facility]:    Initiate vaginal progesterone therapy until 37 weeks gestation   o Supplementation with vaginal progesterone 200 mg qhs   Start cervical length surveillance until  22 weeks 6 days gestation. This is to be scheduled by primary OB.  o If the cervix measures <30 mm, perform weekly cervical length   o If the cervical length is <25 mm, cerclage should be offered in women with a prior  birth
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
## 2023-01-24 NOTE — ASSESSMENT & PLAN NOTE
Patient has had multiple SABs ranging from 2010 to most recently had 3 in the last 2 years.  Given this history, I would recommend working up for antiphospholipid antibody syndrome.  I do not see that these have been collected previously.    Recommendations:   Primary OB to order APLAS labs (please see below)   Further management per primary OB  '

## 2023-01-24 NOTE — ASSESSMENT & PLAN NOTE
"There are > 50 different red cell antigens that have been associated with hemolytic disease of the fetus and  (HDFN), but the antibodies frequently associated with severe HDFN include those against RhD, Rhc, and [place] (K1). Patient has been found to be Anti-Fya and E positive. These antibodies are known to cause HDFN.   We discussed the pathophysiology of antibody sensitization as well as the risk of fetal anemia and hydrops. HDFN does not usually occur until a critical titer is reached (1:16 at [facility]). Titers are not useful for monitoring fetal status when the mother has had a previously affected fetus or  -  management should assume a critical titer for current pregnancy.   Of note there appears to be a prior fetal demise at 17 weeks in .  Records for this pregnancy are not available ([facility]  She reports that my blood and father of the baby's blood were not compatible."  However, given that this is a new father and the questionable history with the fact that she was probably I saw immunize at a later time ([facility] ), I would treat this pregnancy with our (routine) algorithm below.    Recommendations:   Verification of the father of the babys blood group and antigen phenotype. This will reveal one of the following scenarios:  o If he is does not carry the Fya and E antigen (phenotype negative), there is nothing further to do as this fetus will not have inherited the antigens and will not be at risk for hemolytic disease.      o If he carries the antigen (phenotype positive), may opt to determine genotype/zygosity:  - If he is heterozygous, the fetus has a 50% chance of carrying the antigen. Amniocentesis may offered to verify fetal blood cell antigen genotyping, which is associated with a small risk of miscarriage.    - If he is homozygous for Fya and E, then the fetus carries the antigen and management is discussed below.       If the FOB is unknown, declines testing, FOB is homozygous " for the Fya and E antigen, or if the patient declines amniocentesis, recommend repeat antibody screen at 4-week intervals, using the same lab as the initial titers. If the patients antibody titer rises to a critical value of 1:16, there are two main options:   o Amniocentesis for fetal blood cell antigen genotyping. If the fetus does not carry the Fya and E antigen, it is not at risk for hemolytic disease.    o Fetal surveillance by assessment of the MCA peak systolic velocity (PSV) every one to two weeks as determined by MFM.  If the MCA PSV reaches > 1.5 MoM, fetal blood sampling and transfusion may be necessary. This will be coordinated by MFM if needed.    After reaching the critical titer for MCA PSV screening, it is no longer necessary to perform serial antibody titers.   If the fetus is at risk for HDFN (i.e.: undergoing MCA surveillance)  o Weekly  testing starting at 32 weeks  o Delivery between 37 0/7 - 37 6/7 weeks if no transfusion needed.   o Individualization of delivery timing would occur if transfusion is required. Because data are limited regarding timing of delivery, each case will be individualized by the MFM team.   If critical titer is not found, delivery per other comorbidities   Consider maternal blood cross-matching at admission for delivery (if rare antibody and increased risk of hemorrhage) due to potential delay in finding matched units.     Please note that this management scheme assumes that the patients partner is indeed the father of the baby.

## 2023-01-24 NOTE — ASSESSMENT & PLAN NOTE
Patient has an extensive history of infective endocarditis that was treated with multiple rounds of antibiotics in 2019.  Please refer to care everywhere for extensive notes detail and this.  Patient reports that she has a hole in her right heart valve. I do not see evidence of this.  Most recent echo during this 2019 admission:  · Moderate tricuspid regurgitation. There is a 1.1 x 1.2 cm mobile   echodensity noted on the atrial surface of the tricuspid valve.   · No evidence of additional valve involvement of presumed endocarditis by   available views and Doppler.   · Normal wall motion. Normal (55-65%) ejection fraction.   · Normal left ventricle diastolic function.   · Normal right ventricle cavity size and ejection fraction.     Patient reports that she was subsequently seen by a cardiologist as the [facility] [place] [facility] (records nor available). She then transferred her care to Dr. Little at [facility]. However, patient never made it to her appointments there and had never establish care.  Patient reports that she was supposed to have a treadmill test and this was also never performed.   I am unable to fully counseled patient regarding any possible risk during this pregnancy related to a possible cardiac disease.  Patient does not report any cardiac symptoms including chest pain, palpitations, shortness of breath.  A more recent EKG in May of 2020 was overall unremarkable (reassuring).  I would highly recommend further testing prior to final recommendations and counseling.    Recommendations:   Maternal echo to be ordered by primary OB.  This is to be performed ASAP   Referral to cardiology   Further recommendations based on further testing.

## 2023-01-25 ENCOUNTER — LAB VISIT (OUTPATIENT)
Dept: LAB | Facility: HOSPITAL | Age: 33
End: 2023-01-25
Attending: OBSTETRICS & GYNECOLOGY
Payer: MEDICAID

## 2023-01-25 ENCOUNTER — PATIENT MESSAGE (OUTPATIENT)
Dept: OTHER | Facility: OTHER | Age: 33
End: 2023-01-25
Payer: MEDICAID

## 2023-01-25 DIAGNOSIS — O36.5920 POOR FETAL GROWTH AFFECTING MANAGEMENT OF MOTHER IN SECOND TRIMESTER, SINGLE OR UNSPECIFIED FETUS: ICD-10-CM

## 2023-01-25 DIAGNOSIS — I82.4Y1 ACUTE DEEP VEIN THROMBOSIS (DVT) OF PROXIMAL VEIN OF RIGHT LOWER EXTREMITY: ICD-10-CM

## 2023-01-25 DIAGNOSIS — R76.8 POSITIVE COOMBS TEST: ICD-10-CM

## 2023-01-25 DIAGNOSIS — B18.2 CHRONIC HEPATITIS C WITHOUT HEPATIC COMA: ICD-10-CM

## 2023-01-25 LAB
ALBUMIN SERPL BCP-MCNC: 3 G/DL (ref 3.5–5.2)
ALP SERPL-CCNC: 147 U/L (ref 55–135)
ALT SERPL W/O P-5'-P-CCNC: 36 U/L (ref 10–44)
ANION GAP SERPL CALC-SCNC: 7 MMOL/L (ref 8–16)
AST SERPL-CCNC: 29 U/L (ref 10–40)
BILIRUB SERPL-MCNC: 0.3 MG/DL (ref 0.1–1)
BUN SERPL-MCNC: 6 MG/DL (ref 6–20)
CALCIUM SERPL-MCNC: 9.1 MG/DL (ref 8.7–10.5)
CHLORIDE SERPL-SCNC: 106 MMOL/L (ref 95–110)
CO2 SERPL-SCNC: 22 MMOL/L (ref 23–29)
CREAT SERPL-MCNC: 0.6 MG/DL (ref 0.5–1.4)
EST. GFR  (NO RACE VARIABLE): >60 ML/MIN/1.73 M^2
GLUCOSE SERPL-MCNC: 102 MG/DL (ref 70–110)
POTASSIUM SERPL-SCNC: 4.3 MMOL/L (ref 3.5–5.1)
PROT SERPL-MCNC: 6.8 G/DL (ref 6–8.4)
SODIUM SERPL-SCNC: 135 MMOL/L (ref 136–145)

## 2023-01-25 PROCEDURE — 36415 COLL VENOUS BLD VENIPUNCTURE: CPT | Performed by: OBSTETRICS & GYNECOLOGY

## 2023-01-25 PROCEDURE — 81220 CFTR GENE COM VARIANTS: CPT | Performed by: OBSTETRICS & GYNECOLOGY

## 2023-01-25 PROCEDURE — 86147 CARDIOLIPIN ANTIBODY EA IG: CPT | Performed by: OBSTETRICS & GYNECOLOGY

## 2023-01-25 PROCEDURE — 85613 RUSSELL VIPER VENOM DILUTED: CPT | Performed by: OBSTETRICS & GYNECOLOGY

## 2023-01-25 PROCEDURE — 86778 TOXOPLASMA ANTIBODY IGM: CPT | Performed by: OBSTETRICS & GYNECOLOGY

## 2023-01-25 PROCEDURE — 86900 BLOOD TYPING SEROLOGIC ABO: CPT | Performed by: OBSTETRICS & GYNECOLOGY

## 2023-01-25 PROCEDURE — 86644 CMV ANTIBODY: CPT | Performed by: OBSTETRICS & GYNECOLOGY

## 2023-01-25 PROCEDURE — 86870 RBC ANTIBODY IDENTIFICATION: CPT | Performed by: OBSTETRICS & GYNECOLOGY

## 2023-01-25 PROCEDURE — 80053 COMPREHEN METABOLIC PANEL: CPT | Performed by: OBSTETRICS & GYNECOLOGY

## 2023-01-25 PROCEDURE — 86886 COOMBS TEST INDIRECT TITER: CPT | Performed by: OBSTETRICS & GYNECOLOGY

## 2023-01-25 PROCEDURE — 86146 BETA-2 GLYCOPROTEIN ANTIBODY: CPT | Mod: 59 | Performed by: OBSTETRICS & GYNECOLOGY

## 2023-01-25 PROCEDURE — 85610 PROTHROMBIN TIME: CPT | Performed by: OBSTETRICS & GYNECOLOGY

## 2023-01-25 PROCEDURE — 86645 CMV ANTIBODY IGM: CPT | Performed by: OBSTETRICS & GYNECOLOGY

## 2023-01-25 PROCEDURE — 86777 TOXOPLASMA ANTIBODY: CPT | Performed by: OBSTETRICS & GYNECOLOGY

## 2023-01-25 PROCEDURE — 87522 HEPATITIS C REVRS TRNSCRPJ: CPT | Mod: 59 | Performed by: OBSTETRICS & GYNECOLOGY

## 2023-01-26 ENCOUNTER — TELEPHONE (OUTPATIENT)
Dept: OBSTETRICS AND GYNECOLOGY | Facility: CLINIC | Age: 33
End: 2023-01-26
Payer: MEDICAID

## 2023-01-26 LAB
ABO + RH BLD: ABNORMAL
BLD GP AB SCN CELLS X3 SERPL QL: ABNORMAL
BLOOD GROUP ANTIBODIES SERPL: NORMAL
HCV RNA SERPL NAA+PROBE-LOG IU: 5.38 LOGIU/ML
HCV RNA SERPL QL NAA+PROBE: DETECTED
HCV RNA SPEC NAA+PROBE-ACNC: ABNORMAL IU/ML

## 2023-01-27 ENCOUNTER — PATIENT MESSAGE (OUTPATIENT)
Dept: OBSTETRICS AND GYNECOLOGY | Facility: CLINIC | Age: 33
End: 2023-01-27
Payer: MEDICAID

## 2023-01-27 LAB — CMV IGG SERPL QL IA: REACTIVE

## 2023-01-29 LAB
B2 GLYCOPROT1 IGA SER QL: <9 SAU
B2 GLYCOPROT1 IGG SER QL: <9 SGU
B2 GLYCOPROT1 IGM SER QL: <9 SMU

## 2023-01-30 LAB
CMV IGM SERPL IA-ACNC: <8 AU/ML
T GONDII IGM SER-ACNC: <3 AU/ML

## 2023-01-31 ENCOUNTER — TELEPHONE (OUTPATIENT)
Dept: OBSTETRICS AND GYNECOLOGY | Facility: CLINIC | Age: 33
End: 2023-01-31

## 2023-01-31 DIAGNOSIS — O36.1990 MATERNAL RED CELL ALLOIMMUNIZATION, ANTEPARTUM, SINGLE OR UNSPECIFIED FETUS: Primary | ICD-10-CM

## 2023-01-31 NOTE — TELEPHONE ENCOUNTER
"Received this message from lab:  "Patient had type and screen drawn 1/25/23 and has two antiblodies requiring titers. The specimen did not have enough volume for titers. Could you please have patient come in and have redraw with possibly two pink tops drawn?"    Alla, can you please contact the patient and schedule a lab appointment for this?  If unable to come this week, maybe she can get this drawn when she sees Mo[facility] on 2/9?  "

## 2023-02-01 LAB
T GONDII IGG SER QL IA: NORMAL
T GONDII IGG SERPL IA-ACNC: <5 IU/ML (ref 0–6.4)

## 2023-02-02 ENCOUNTER — TELEPHONE (OUTPATIENT)
Dept: OBSTETRICS AND GYNECOLOGY | Facility: CLINIC | Age: 33
End: 2023-02-02
Payer: MEDICAID

## 2023-02-02 LAB
APTT IMM NP PPP: ABNORMAL SEC (ref 32–48)
APTT P HEP NEUT PPP: ABNORMAL SEC (ref 32–48)
CONFIRM APTT STACLOT: ABNORMAL
DRVVT SCREEN TO CONFIRM RATIO: ABNORMAL RATIO
LA 3 SCREEN W REFLEX-IMP: ABNORMAL
LA NT DPL PPP QL: ABNORMAL
MIXING DRVVT: ABNORMAL SEC (ref 33–44)
PROTHROMBIN TIME: 11.9 SEC (ref 12–15.5)
REPTILASE TIME: ABNORMAL SEC
SCREEN APTT: 39 SEC (ref 32–48)
SCREEN DRVVT: 32 SEC (ref 33–44)
THROMBIN TIME: ABNORMAL SEC (ref 14.7–19.5)

## 2023-02-02 NOTE — TELEPHONE ENCOUNTER
I spoke with Norma on yesterday in regards to her cardiology appt with Dr. Little in [place] and her additional titer labs needed. Norma stated that she did not attend her appt on yesterday. I asked if this was due to lack of resources such as transportation and she said she has dependable transportation, no further elaboration was made on the reason for not attending her appt. She said that she would be able to get her labs done on yesterday at the [facility] location.     I spoke directly to Dr. Mariee on this manner.

## 2023-02-07 ENCOUNTER — LAB VISIT (OUTPATIENT)
Dept: LAB | Facility: HOSPITAL | Age: 33
End: 2023-02-07
Attending: OBSTETRICS & GYNECOLOGY
Payer: MEDICAID

## 2023-02-07 DIAGNOSIS — O36.1990 MATERNAL RED CELL ALLOIMMUNIZATION, ANTEPARTUM, SINGLE OR UNSPECIFIED FETUS: ICD-10-CM

## 2023-02-07 LAB — BLD GP AB SCN TITR SERPL: NORMAL {TITER}

## 2023-02-07 PROCEDURE — 36415 COLL VENOUS BLD VENIPUNCTURE: CPT | Performed by: OBSTETRICS & GYNECOLOGY

## 2023-02-07 PROCEDURE — 86886 COOMBS TEST INDIRECT TITER: CPT | Performed by: OBSTETRICS & GYNECOLOGY

## 2023-02-08 ENCOUNTER — TELEPHONE (OUTPATIENT)
Dept: OBSTETRICS AND GYNECOLOGY | Facility: CLINIC | Age: 33
End: 2023-02-08
Payer: MEDICAID

## 2023-02-08 NOTE — TELEPHONE ENCOUNTER
I spoke with Ms. De La Garza today about her scheduled visits on 2/9. She stated that she has taken off of work and absolutely intends to make her appointments. I stressed the importance of making these appointments and offered any resources for assistance, which she declined. I will follow-up on tomorrow.

## 2023-02-09 ENCOUNTER — ROUTINE PRENATAL (OUTPATIENT)
Dept: OBSTETRICS AND GYNECOLOGY | Facility: CLINIC | Age: 33
End: 2023-02-09
Payer: MEDICAID

## 2023-02-09 ENCOUNTER — HOSPITAL ENCOUNTER (OUTPATIENT)
Dept: CARDIOLOGY | Facility: HOSPITAL | Age: 33
Discharge: HOME | End: 2023-02-09
Attending: OBSTETRICS & GYNECOLOGY
Payer: MEDICAID

## 2023-02-09 ENCOUNTER — PROCEDURE VISIT (OUTPATIENT)
Dept: OBSTETRICS AND GYNECOLOGY | Facility: CLINIC | Age: 33
End: 2023-02-09
Payer: MEDICAID

## 2023-02-09 VITALS
HEART RATE: 66 BPM | WEIGHT: 108 LBS | HEIGHT: 64 IN | SYSTOLIC BLOOD PRESSURE: 98 MMHG | DIASTOLIC BLOOD PRESSURE: 64 MMHG | BODY MASS INDEX: 18.44 KG/M2

## 2023-02-09 VITALS
BODY MASS INDEX: 18.69 KG/M2 | DIASTOLIC BLOOD PRESSURE: 64 MMHG | WEIGHT: 108.94 LBS | SYSTOLIC BLOOD PRESSURE: 110 MMHG

## 2023-02-09 DIAGNOSIS — O36.1990 MATERNAL RED CELL ALLOIMMUNIZATION, ANTEPARTUM, SINGLE OR UNSPECIFIED FETUS: Primary | ICD-10-CM

## 2023-02-09 DIAGNOSIS — O09.899 HISTORY OF PRETERM DELIVERY, CURRENTLY PREGNANT: ICD-10-CM

## 2023-02-09 DIAGNOSIS — Z86.79 HISTORY OF ENDOCARDITIS: ICD-10-CM

## 2023-02-09 LAB
AORTIC ROOT ANNULUS: 2.39 CM
ASCENDING AORTA: 2.15 CM
AV INDEX (PROSTH): 0.76
AV MEAN GRADIENT: 5 MMHG
AV PEAK GRADIENT: 9 MMHG
AV VALVE AREA: 2.26 CM2
AV VELOCITY RATIO: 0.8
BSA FOR ECHO PROCEDURE: 1.49 M2
CV ECHO LV RWT: 0.45 CM
DOP CALC AO PEAK VEL: 1.53 M/S
DOP CALC AO VTI: 29.9 CM
DOP CALC LVOT AREA: 3 CM2
DOP CALC LVOT DIAMETER: 1.95 CM
DOP CALC LVOT PEAK VEL: 1.22 M/S
DOP CALC LVOT STROKE VOLUME: 67.46 CM3
DOP CALC RVOT PEAK VEL: 0.69 M/S
DOP CALC RVOT VTI: 16.4 CM
DOP CALCLVOT PEAK VEL VTI: 22.6 CM
E WAVE DECELERATION TIME: 195.26 MSEC
E/A RATIO: 1.45
E/E' RATIO: 6.21 M/S
ECHO LV POSTERIOR WALL: 0.93 CM (ref 0.6–1.1)
EJECTION FRACTION: 60 %
FRACTIONAL SHORTENING: 29 % (ref 28–44)
INTERVENTRICULAR SEPTUM: 0.92 CM (ref 0.6–1.1)
IVRT: 94.2 MSEC
LA MAJOR: 3.31 CM
LA MINOR: 3.41 CM
LA WIDTH: 3 CM
LEFT ATRIUM SIZE: 3.04 CM
LEFT ATRIUM VOLUME INDEX MOD: 14.4 ML/M2
LEFT ATRIUM VOLUME INDEX: 17.2 ML/M2
LEFT ATRIUM VOLUME MOD: 21.7 CM3
LEFT ATRIUM VOLUME: 26.04 CM3
LEFT INTERNAL DIMENSION IN SYSTOLE: 2.92 CM (ref 2.1–4)
LEFT VENTRICLE DIASTOLIC VOLUME INDEX: 50.15 ML/M2
LEFT VENTRICLE DIASTOLIC VOLUME: 75.73 ML
LEFT VENTRICLE MASS INDEX: 80 G/M2
LEFT VENTRICLE SYSTOLIC VOLUME INDEX: 21.8 ML/M2
LEFT VENTRICLE SYSTOLIC VOLUME: 32.86 ML
LEFT VENTRICULAR INTERNAL DIMENSION IN DIASTOLE: 4.14 CM (ref 3.5–6)
LEFT VENTRICULAR MASS: 120.38 G
LV LATERAL E/E' RATIO: 6 M/S
LV SEPTAL E/E' RATIO: 6.43 M/S
LVOT MG: 3.26 MMHG
LVOT MV: 0.82 CM/S
MV PEAK A VEL: 0.62 M/S
MV PEAK E VEL: 0.9 M/S
MV STENOSIS PRESSURE HALF TIME: 56.62 MS
MV VALVE AREA P 1/2 METHOD: 3.89 CM2
PISA TR MAX VEL: 2.45 M/S
PULM VEIN S/D RATIO: 0.77
PV MEAN GRADIENT: 1.14 MMHG
PV PEAK D VEL: 0.64 M/S
PV PEAK S VEL: 0.49 M/S
PV PEAK VELOCITY: 1.05 CM/S
RA MAJOR: 3.42 CM
RA PRESSURE: 3 MMHG
RA WIDTH: 3.13 CM
RIGHT VENTRICULAR END-DIASTOLIC DIMENSION: 2.67 CM
SINUS: 2.08 CM
STJ: 1.92 CM
TDI LATERAL: 0.15 M/S
TDI SEPTAL: 0.14 M/S
TDI: 0.15 M/S
TR MAX PG: 24 MMHG
TRICUSPID ANNULAR PLANE SYSTOLIC EXCURSION: 1.51 CM
TV REST PULMONARY ARTERY PRESSURE: 27 MMHG

## 2023-02-09 PROCEDURE — 99999 PR PBB SHADOW E&M-EST. PATIENT-LVL III: CPT | Mod: PBBFAC,,, | Performed by: MIDWIFE

## 2023-02-09 PROCEDURE — 76817 US OB/GYN PROCEDURE (VIEWPOINT): ICD-10-PCS | Mod: 26,S$PBB,, | Performed by: OBSTETRICS & GYNECOLOGY

## 2023-02-09 PROCEDURE — 76817 TRANSVAGINAL US OBSTETRIC: CPT | Mod: PBBFAC | Performed by: OBSTETRICS & GYNECOLOGY

## 2023-02-09 PROCEDURE — 99214 PR OFFICE/OUTPT VISIT, EST, LEVL IV, 30-39 MIN: ICD-10-PCS | Mod: 25,TH,S$PBB, | Performed by: MIDWIFE

## 2023-02-09 PROCEDURE — 93306 TTE W/DOPPLER COMPLETE: CPT

## 2023-02-09 PROCEDURE — 99213 OFFICE O/P EST LOW 20 MIN: CPT | Mod: PBBFAC,TH,25 | Performed by: MIDWIFE

## 2023-02-09 PROCEDURE — 99999 PR PBB SHADOW E&M-EST. PATIENT-LVL III: ICD-10-PCS | Mod: PBBFAC,,, | Performed by: MIDWIFE

## 2023-02-09 PROCEDURE — 93306 ECHO (CUPID ONLY): ICD-10-PCS | Mod: 26,,, | Performed by: INTERNAL MEDICINE

## 2023-02-09 PROCEDURE — 93306 TTE W/DOPPLER COMPLETE: CPT | Mod: 26,,, | Performed by: INTERNAL MEDICINE

## 2023-02-09 PROCEDURE — 99214 OFFICE O/P EST MOD 30 MIN: CPT | Mod: 25,TH,S$PBB, | Performed by: MIDWIFE

## 2023-02-09 NOTE — PROGRESS NOTES
32 y.o. female  at 22w1d   Reports + FM, denies VB, LOF, or cramping  Reviewed MFM reports, labs today, keep appt w/ MFM   Pt has ECHO today  US shows cervical shortening from last appt 3 wks ago, from 39.3 to 25.4, reviewed w/ [facility] [place], repeat TV cervical length in 1 wk and continue vaginal progesterone, Matheus to call pt and inform of appt, pt had to go to cardiology appt   Reviewed warning signs, normal FM,  labor precautions and how/when to call.  RTC x 4 wks, call or present sooner prn.     I spent a total of 20 minutes on the day of the visit.This includes face to face time and non-face to face time preparing to see the patient (eg, review of tests), Obtaining and/or reviewing separately obtained history, Documenting clinical information in the electronic or other health record, Independently interpreting resultsand communicating results to the patient/family/caregiver, or Care coordination.

## 2023-02-16 ENCOUNTER — PATIENT MESSAGE (OUTPATIENT)
Dept: CARDIOLOGY | Facility: CLINIC | Age: 33
End: 2023-02-16
Payer: MEDICAID

## 2023-02-16 ENCOUNTER — PROCEDURE VISIT (OUTPATIENT)
Dept: OBSTETRICS AND GYNECOLOGY | Facility: CLINIC | Age: 33
End: 2023-02-16
Payer: MEDICAID

## 2023-02-16 DIAGNOSIS — O09.899 HISTORY OF PRETERM DELIVERY, CURRENTLY PREGNANT: ICD-10-CM

## 2023-02-16 PROCEDURE — 76817 TRANSVAGINAL US OBSTETRIC: CPT | Mod: PBBFAC | Performed by: OBSTETRICS & GYNECOLOGY

## 2023-02-16 PROCEDURE — 76817 US OB/GYN PROCEDURE (VIEWPOINT): ICD-10-PCS | Mod: 26,S$PBB,, | Performed by: OBSTETRICS & GYNECOLOGY

## 2023-02-17 ENCOUNTER — OFFICE VISIT (OUTPATIENT)
Dept: URGENT CARE | Facility: CLINIC | Age: 33
End: 2023-02-17
Payer: MEDICAID

## 2023-02-17 VITALS
DIASTOLIC BLOOD PRESSURE: 75 MMHG | OXYGEN SATURATION: 98 % | HEIGHT: 64 IN | RESPIRATION RATE: 18 BRPM | SYSTOLIC BLOOD PRESSURE: 127 MMHG | WEIGHT: 108 LBS | HEART RATE: 95 BPM | TEMPERATURE: 98 F | BODY MASS INDEX: 18.44 KG/M2

## 2023-02-17 DIAGNOSIS — R05.9 COUGH, UNSPECIFIED TYPE: ICD-10-CM

## 2023-02-17 DIAGNOSIS — J06.9 VIRAL URI: Primary | ICD-10-CM

## 2023-02-17 DIAGNOSIS — J02.9 SORE THROAT: ICD-10-CM

## 2023-02-17 DIAGNOSIS — Z20.822 CONTACT WITH AND (SUSPECTED) EXPOSURE TO COVID-19: ICD-10-CM

## 2023-02-17 LAB
CTP QC/QA: YES
SARS-COV-2 AG RESP QL IA.RAPID: NEGATIVE

## 2023-02-17 PROCEDURE — 3078F DIAST BP <80 MM HG: CPT | Mod: CPTII,S$GLB,, | Performed by: NURSE PRACTITIONER

## 2023-02-17 PROCEDURE — 3074F PR MOST RECENT SYSTOLIC BLOOD PRESSURE < 130 MM HG: ICD-10-PCS | Mod: CPTII,S$GLB,, | Performed by: NURSE PRACTITIONER

## 2023-02-17 PROCEDURE — 3074F SYST BP LT 130 MM HG: CPT | Mod: CPTII,S$GLB,, | Performed by: NURSE PRACTITIONER

## 2023-02-17 PROCEDURE — 1159F PR MEDICATION LIST DOCUMENTED IN MEDICAL RECORD: ICD-10-PCS | Mod: CPTII,S$GLB,, | Performed by: NURSE PRACTITIONER

## 2023-02-17 PROCEDURE — 1160F PR REVIEW ALL MEDS BY PRESCRIBER/CLIN PHARMACIST DOCUMENTED: ICD-10-PCS | Mod: CPTII,S$GLB,, | Performed by: NURSE PRACTITIONER

## 2023-02-17 PROCEDURE — 99213 OFFICE O/P EST LOW 20 MIN: CPT | Mod: S$GLB,,, | Performed by: NURSE PRACTITIONER

## 2023-02-17 PROCEDURE — 3008F BODY MASS INDEX DOCD: CPT | Mod: CPTII,S$GLB,, | Performed by: NURSE PRACTITIONER

## 2023-02-17 PROCEDURE — 87811 SARS-COV-2 COVID19 W/OPTIC: CPT | Mod: QW,S$GLB,, | Performed by: NURSE PRACTITIONER

## 2023-02-17 PROCEDURE — 1159F MED LIST DOCD IN RCRD: CPT | Mod: CPTII,S$GLB,, | Performed by: NURSE PRACTITIONER

## 2023-02-17 PROCEDURE — 3008F PR BODY MASS INDEX (BMI) DOCUMENTED: ICD-10-PCS | Mod: CPTII,S$GLB,, | Performed by: NURSE PRACTITIONER

## 2023-02-17 PROCEDURE — 87811 SARS CORONAVIRUS 2 ANTIGEN POCT, MANUAL READ: ICD-10-PCS | Mod: QW,S$GLB,, | Performed by: NURSE PRACTITIONER

## 2023-02-17 PROCEDURE — 1160F RVW MEDS BY RX/DR IN RCRD: CPT | Mod: CPTII,S$GLB,, | Performed by: NURSE PRACTITIONER

## 2023-02-17 PROCEDURE — 99213 PR OFFICE/OUTPT VISIT, EST, LEVL III, 20-29 MIN: ICD-10-PCS | Mod: S$GLB,,, | Performed by: NURSE PRACTITIONER

## 2023-02-17 PROCEDURE — 3078F PR MOST RECENT DIASTOLIC BLOOD PRESSURE < 80 MM HG: ICD-10-PCS | Mod: CPTII,S$GLB,, | Performed by: NURSE PRACTITIONER

## 2023-02-17 NOTE — LETTER
February 17, 2023      [facility]  [address], [address]  [place] [address]  Phone: [phone]  Fax: [phone]       Patient: Norma De La Garza   YOB: 1990  Date of Visit: 02/17/2023    To Whom It May Concern:    Dre De La Garza  was at [facility] on 02/17/2023. The patient may return to work/school on 02/20/2023 with no restrictions. If you have any questions or concerns, or if I can be of further assistance, please do not hesitate to contact me.    Sincerely,      Lsia Maguire, NP

## 2023-02-17 NOTE — PATIENT INSTRUCTIONS
Rest  Hydration/increase fluids  Zyrtec OTC as directed for nasal congestion  Tylenol OTC as directed for body aches  Caution with OTC medications  Signs and symptoms of worsening discussed  Follow up as needed

## 2023-02-17 NOTE — PROGRESS NOTES
"Subjective:       Patient ID: Norma De La Garza is a 32 y.o. female.    Vitals:  height is 5' 4" (1.626 m) and weight is 49 kg (108 lb). Her temperature is 98.2 °F (36.8 °C). Her blood pressure is 127/75 and her pulse is 95. Her respiration is 18 and oxygen saturation is 98%.     Chief Complaint: Cough    32 year old female presents for evaluation of cough, fatigue, body aches, and sweats since last night. Boyfriend tested positive for Covid 3 days ago. 23 weeks gestation.     Other  This is a new problem. The current episode started in the past 7 days. The problem occurs constantly. The problem has been unchanged. Associated symptoms include chills, congestion, coughing, diaphoresis, fatigue, headaches, myalgias, nausea, a sore throat and vomiting. Pertinent negatives include no abdominal pain, anorexia, arthralgias, change in bowel habit, chest pain, fever, joint swelling, neck pain, numbness, rash, swollen glands, urinary symptoms, vertigo, visual change or weakness. Nothing aggravates the symptoms. She has tried nothing for the symptoms.     Constitution: Positive for chills, sweating and fatigue. Negative for activity change, appetite change and fever.   HENT:  Positive for congestion and sore throat.    Neck: neck negative. Negative for neck pain.   Cardiovascular: Negative.  Negative for chest pain.   Eyes: Negative.    Respiratory:  Positive for cough and sputum production. Negative for chest tightness, bloody sputum, shortness of breath and wheezing.    Gastrointestinal:  Positive for nausea and vomiting. Negative for abdominal pain.   Endocrine: negative.   Genitourinary: Negative.    Musculoskeletal:  Positive for muscle ache. Negative for joint pain and joint swelling.   Skin:  Negative for rash.   Allergic/Immunologic: Negative for sneezing.   Neurological:  Positive for headaches. Negative for history of vertigo and numbness.   Hematologic/Lymphatic: Negative.    Psychiatric/Behavioral: Negative.     "   Objective:      Physical Exam   Constitutional: She is oriented to person, place, and time.  Non-toxic appearance. She does not appear ill. No distress. awake  HENT:   Head: Normocephalic and atraumatic.   Ears:   Right Ear: Tympanic membrane, external ear and ear canal normal. No cerumen not present. Tympanic membrane is not injected, not scarred, not perforated, not erythematous, not retracted and not bulging. impacted cerumen  Left Ear: Tympanic membrane, external ear and ear canal normal. No cerumen not present. Tympanic membrane is not injected, not scarred, not perforated, not erythematous, not retracted and not bulging. impacted cerumen  Nose: Nose normal. No mucosal edema, rhinorrhea or purulent discharge. Right sinus exhibits no maxillary sinus tenderness and no frontal sinus tenderness. Left sinus exhibits no maxillary sinus tenderness and no frontal sinus tenderness.   Mouth/Throat: Mucous membranes are normal. Mucous membranes are moist. Posterior oropharyngeal erythema and cobblestoning present. No oropharyngeal exudate, posterior oropharyngeal edema or tonsillar abscesses. Tonsils are 0 on the right. Tonsils are 0 on the left. No tonsillar exudate.   Eyes: Conjunctivae are normal. Pupils are equal, round, and reactive to light. Right eye exhibits no discharge. Left eye exhibits no discharge. No scleral icterus. Extraocular movement intact   Neck: Neck supple.   Cardiovascular: Normal rate, regular rhythm and normal heart sounds.   Pulmonary/Chest: Effort normal and breath sounds normal. No stridor. No respiratory distress. She has no decreased breath sounds. She has no wheezes. She has no rhonchi. She has no rales. She exhibits no tenderness.   Musculoskeletal: Normal range of motion.         General: Normal range of motion.   Neurological: no focal deficit. She is alert and oriented to person, place, and time.   Skin: Skin is warm, dry and not diaphoretic.   Psychiatric: Her behavior is normal.  Mood, judgment and thought content normal.   Nursing note and vitals reviewed.      Results for orders placed or performed in visit on 02/17/23   SARS Coronavirus 2 Antigen, POCT Manual Read   Result Value Ref Range    SARS Coronavirus 2 Antigen Negative Negative     Acceptable Yes        Assessment:       1. Viral URI    2. Cough, unspecified type    3. Sore throat    4. Contact with and (suspected) exposure to covid-19          Plan:       Patient presents with symptoms that are consistent with acute viral URI. Decision to swab for Covid based on recent exposure. Plan is to manage symptoms and prevent worsening. Discussed with patient who verbalizes understanding.    Viral URI    Cough, unspecified type  -     SARS Coronavirus 2 Antigen, POCT Manual Read    Sore throat  -     SARS Coronavirus 2 Antigen, POCT Manual Read    Contact with and (suspected) exposure to covid-19                 Patient Instructions   Rest  Hydration/increase fluids  Zyrtec OTC as directed for nasal congestion  Tylenol OTC as directed for body aches  Caution with OTC medications  Signs and symptoms of worsening discussed  Follow up as needed

## 2023-02-17 NOTE — LETTER
February 17, 2023      [facility]  [address], [address]  [place] [address]  Phone: [phone]  Fax: [phone]       Patient: Norma De La Garza   YOB: 1990  Date of Visit: 02/17/2023    To Whom It May Concern:    Dre De La Garza  was at [facility] on 02/17/2023. The patient may return to work/school on *** {With/no:77538} restrictions. If you have any questions or concerns, or if I can be of further assistance, please do not hesitate to contact me.    Sincerely,    Lisa Maguire NP

## 2023-02-20 ENCOUNTER — TELEPHONE (OUTPATIENT)
Dept: URGENT CARE | Facility: CLINIC | Age: 33
End: 2023-02-20
Payer: MEDICAID

## 2023-02-20 ENCOUNTER — TELEPHONE (OUTPATIENT)
Dept: OBSTETRICS AND GYNECOLOGY | Facility: CLINIC | Age: 33
End: 2023-02-20
Payer: MEDICAID

## 2023-02-20 LAB
CARDIOLIPIN IGG SER IA-ACNC: <9.4 GPL (ref 0–14.99)
CARDIOLIPIN IGM SER IA-ACNC: 18 MPL (ref 0–12.49)
CFTR MUT ANL BLD/T: NORMAL

## 2023-02-20 NOTE — TELEPHONE ENCOUNTER
----- Message from Ivonne Oviedo sent at 2/20/2023  8:29 AM CST -----  Contact: Norma Green would like a call back at [phone] in regards to seeing if she supposed to be having blood work done and if so when will the orders be put in.  Thanks   Am

## 2023-02-22 ENCOUNTER — PATIENT MESSAGE (OUTPATIENT)
Dept: OTHER | Facility: OTHER | Age: 33
End: 2023-02-22
Payer: MEDICAID

## 2023-02-27 ENCOUNTER — PATIENT MESSAGE (OUTPATIENT)
Dept: MATERNAL FETAL MEDICINE | Facility: CLINIC | Age: 33
End: 2023-02-27
Payer: MEDICAID

## 2023-02-27 ENCOUNTER — PROCEDURE VISIT (OUTPATIENT)
Dept: OBSTETRICS AND GYNECOLOGY | Facility: CLINIC | Age: 33
End: 2023-02-27
Payer: MEDICAID

## 2023-02-27 VITALS
DIASTOLIC BLOOD PRESSURE: 60 MMHG | SYSTOLIC BLOOD PRESSURE: 109 MMHG | WEIGHT: 106.25 LBS | HEIGHT: 64 IN | BODY MASS INDEX: 18.14 KG/M2

## 2023-02-27 DIAGNOSIS — O28.3 ABNORMAL FETAL ULTRASOUND: Primary | ICD-10-CM

## 2023-02-27 DIAGNOSIS — O28.3 ABNORMAL FETAL ULTRASOUND: ICD-10-CM

## 2023-02-27 DIAGNOSIS — B18.2 CHRONIC HEPATITIS C WITHOUT HEPATIC COMA: ICD-10-CM

## 2023-02-27 DIAGNOSIS — Z86.79 HISTORY OF ENDOCARDITIS: ICD-10-CM

## 2023-02-27 DIAGNOSIS — O36.5920 POOR FETAL GROWTH AFFECTING MANAGEMENT OF MOTHER IN SECOND TRIMESTER, SINGLE OR UNSPECIFIED FETUS: ICD-10-CM

## 2023-02-27 DIAGNOSIS — O09.892 PREVIOUS DEEP VEIN THROMBOSIS (DVT) AFFECTING PREGNANCY IN SECOND TRIMESTER: ICD-10-CM

## 2023-02-27 DIAGNOSIS — O21.9 NAUSEA AND VOMITING IN PREGNANCY: ICD-10-CM

## 2023-02-27 DIAGNOSIS — Z98.890 HISTORY OF LOOP ELECTRICAL EXCISION PROCEDURE (LEEP): ICD-10-CM

## 2023-02-27 DIAGNOSIS — Z36.89 ENCOUNTER FOR ULTRASOUND TO ASSESS FETAL GROWTH: Primary | ICD-10-CM

## 2023-02-27 DIAGNOSIS — O36.1990 MATERNAL RED CELL ALLOIMMUNIZATION, ANTEPARTUM, SINGLE OR UNSPECIFIED FETUS: ICD-10-CM

## 2023-02-27 DIAGNOSIS — O09.899 HISTORY OF PRETERM DELIVERY, CURRENTLY PREGNANT: ICD-10-CM

## 2023-02-27 DIAGNOSIS — F19.11 HISTORY OF DRUG ABUSE IN REMISSION: ICD-10-CM

## 2023-02-27 DIAGNOSIS — N96 HABITUAL ABORTER: ICD-10-CM

## 2023-02-27 DIAGNOSIS — Z86.718 PREVIOUS DEEP VEIN THROMBOSIS (DVT) AFFECTING PREGNANCY IN SECOND TRIMESTER: ICD-10-CM

## 2023-02-27 DIAGNOSIS — F33.2 SEVERE EPISODE OF RECURRENT MAJOR DEPRESSIVE DISORDER, WITHOUT PSYCHOTIC FEATURES: ICD-10-CM

## 2023-02-27 DIAGNOSIS — Z36.2 ENCOUNTER FOR FOLLOW-UP ULTRASOUND OF FETAL ANATOMY: ICD-10-CM

## 2023-02-27 PROCEDURE — 99215 PR OFFICE/OUTPT VISIT, EST, LEVL V, 40-54 MIN: ICD-10-PCS | Mod: S$PBB,TH,, | Performed by: OBSTETRICS & GYNECOLOGY

## 2023-02-27 PROCEDURE — 99215 OFFICE O/P EST HI 40 MIN: CPT | Mod: S$PBB,TH,, | Performed by: OBSTETRICS & GYNECOLOGY

## 2023-02-27 PROCEDURE — 76816 OB US FOLLOW-UP PER FETUS: CPT | Mod: PBBFAC,PO | Performed by: OBSTETRICS & GYNECOLOGY

## 2023-02-27 PROCEDURE — 76820 US MFM PROCEDURE (VIEWPOINT): ICD-10-PCS | Mod: 26,S$PBB,, | Performed by: OBSTETRICS & GYNECOLOGY

## 2023-02-27 PROCEDURE — 76820 UMBILICAL ARTERY ECHO: CPT | Mod: 26,S$PBB,, | Performed by: OBSTETRICS & GYNECOLOGY

## 2023-02-27 PROCEDURE — 76816 US MFM PROCEDURE (VIEWPOINT): ICD-10-PCS | Mod: 26,S$PBB,, | Performed by: OBSTETRICS & GYNECOLOGY

## 2023-02-27 NOTE — ASSESSMENT & PLAN NOTE
Please see prior note.  Patient had echo (see above).  Has not yet seen Dr. Nieves. Has appointment with him on 3/13.  Denies any cardiac symptoms or complaints currently.  Will  pending cardiology consultation.

## 2023-02-27 NOTE — ASSESSMENT & PLAN NOTE
"Viral load: [id number]  LFTS 1 month ago: normal  STI screening: + trichomonas 2022-defer to primary OB, otherwise negative    Per prior visit with Dr. Damon:    Recommendations:   Repeat screen for HIV infection, hepatitis A and hepatitis B infection, and other sexually transmitted diseases (syphilis, gonorrhea, and chlamydia) in 3rd trimester   Vaccinate for hepatitis A and hepatitis B if non-immune   Repeat LFTs q 1-2 months with primary OB   Refer to hepatology for evaluation and consideration of treatment postpartum   Mode of delivery per other obstetric indications.   Avoid invasive fetal procedures intrapartum (early amniotomy, fetal scalp electrode, and episiotomy) unless necessary.   Breastfeeding is not contraindicated (recommend abstain from breastfeeding if nipples are bleeding or cracked).   Evaluation of  by pediatricians after delivery.     Patient previously counseled on limited data re: vertical transmission of HCV in setting of amniocentesis. Per prior note:   "Amniocentesis does not appear to increase the risk of vertical transmission, although this is based on limited information. If invasive prenatal diagnosis is undertaken, patient was counseled that date on vertical transmission is reassuring, albeit limited."  "

## 2023-02-27 NOTE — ASSESSMENT & PLAN NOTE
Patient states severe N/V. Works at [employer] and is nauseated most of day. Losing weight.  Start Boost supplements.  Discussed hospitalization may be needed for IVF, patient declines.  Recommend primary OB refer patient to nutritionist.

## 2023-02-27 NOTE — ASSESSMENT & PLAN NOTE
Please see prior counseling.  Today's ultrasound is consistent with early onset FGR with micromelia with approximately 4 week lag in biometry for long bones. Much of the anatomy remains suboptimal due to fetal position (evidently in similar position as to prior visit).   TORCH titers were consistent with a likely prior history of CMV (IgG positive, IgM neg); although a periconceptual infection cannot be ruled out.  We discussed the potential for there to be an underlying skeletal dysplasia or other genetic syndrome. The patient has previously been counseled by Dr. Damon and desires an amniocentesis. We will assist in scheduling this.  Will schedule fetal echo.   Will recommend fetal appointments/ultrasound appointments be scheduled separately from maternal issues in the future given complexity of maternal and fetal conditions.  Will schedule genetic counseling visit prior to amniocentesis.

## 2023-02-27 NOTE — ASSESSMENT & PLAN NOTE
Recommendations for primary OB:  Please refer to initial consult for full details  Anticardiolipin inconclusive-please repeat > 12 weeks from initial lab draw  Please obtain prothrombin [id number] mutation and Factor V Leiden mutation analysis  Continue lovenox 40 mg daily and aspirin 81 mg daily.  HOLD LOVENOX 12-24 HOURS PRIOR TO AMNIOCENTESIS. Called listed number-no answer. Generic voicemail left to return call to [facility] clinic.

## 2023-02-27 NOTE — ASSESSMENT & PLAN NOTE
The patients fetus has been diagnosed with fetal growth restriction (FGR) based on an EFW less than the 10th percentile. Today on ultrasound, the EFW is at the 3rd percentile. The amniotic fluid volume is normal. UA dopplers are normal. 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 The survey remains incomplete. Potential etiologies of FGR include but are not limited to normal variation of stature, placental insufficiency, chromosomal abnormalities, genetic disorders, infections, medical conditions, teratogen exposure and other etiologies.  We discussed the increased risk of stillbirth and the potential need for earlier delivery. The patient has had low risk cfDNA. TORCH titers indicate a prior immunity to CMV.    Due to the diagnosis of early FGR which has a higher likelihood of association of chromosomal or genetic disorders, please see the following recommendations:   Monitor for signs and symptoms of preeclampsia    Patient desires amniocentesis, this will be scheduled in the next 2-3 weeks   Genetic counseling referral-will need to discuss possible skeletal dysplasia panel to be obtained at time of amniocentesis.   Consider testing for CMV at time of amniocentesis   Due to concerns of a possible skeletal dysplasia, a fetal echocardiogram was ordered.    The patient should refrain from smoking and using illicit drugs as these are modifiable risk factors for FGR   If FGR persists, as pregnancy progresses,  fetal surveillance, UA Doppler testing, and serial growth ultrasounds will be performed.   Obtain UA Doppler with primary OB next week   The patient has elected to defer  testing until further in gestation. This should be started by 28 weeks.   Delivery timing to be determined based on follow up ultrasounds.

## 2023-02-27 NOTE — ASSESSMENT & PLAN NOTE
Anticardiolipin inconclusive.  Cont lovenox, aspirin.  Recommend primary OB repeat anticardiolipin lab draw > 12 weeks from initial draw.

## 2023-02-27 NOTE — ASSESSMENT & PLAN NOTE
Anti-FyA and Anti-E  Last titer 2/7 too weak to titer.  Consider genotype at time of amniocentesis.  In interim, please continue to obtain antibody titers every 4 weeks with primary OB (next due in 1 week). This should be drawn at next prenatal visit, which is scheduled for 3/7.

## 2023-02-27 NOTE — PROGRESS NOTES
"Maternal Fetal Medicine follow up consult    SUBJECTIVE:     Norma De La Garza is a 32 y.o.  female with IUP at 24w5d who is seen in follow up consultation by MFM.  Pregnancy complications include:   Problem   Abnormal Fetal Ultrasound   History of  Delivery, Currently Pregnant   Poor Fetal Growth Affecting Management of Mother in Second Trimester   Nausea and Vomiting in Pregnancy   History of Drug Abuse in Remission   Maternal Red Cell Alloimmunization, Antepartum   Recurrent Major Depressive Disorder   History of Endocarditis   History of Loop Electrical Excision Procedure (Leep)   Habitual Aborter   Previous Deep Vein Thrombosis (Dvt) Affecting Pregnancy in Second Trimester   Hepatitis C Virus Infection Without Hepatic Coma       Previous notes reviewed.   No changes to medical, surgical, family, social, or obstetric history.    Interval history since last M visit: Significant N/V, weight loss.    Medications:  Current Outpatient Medications   Medication Instructions    aspirin (ECOTRIN) 81 mg, Oral, Daily    enoxaparin (LOVENOX) 40 mg, Subcutaneous, Daily    ondansetron (ZOFRAN-ODT) 4 mg, Oral, Every 12 hours PRN    prenatal 25/iron fum/folic/dha (PRENATAL-1 ORAL) Oral    progesterone (PROMETRIUM) 200 mg, Vaginal, Nightly    promethazine (PHENERGAN) 25 mg, Oral, Every 4 hours PRN    QUEtiapine (SEROQUEL) 50 mg, Oral, Nightly       Care team members:  Dr. Mariee - Primary OB     OBJECTIVE:   /60   Ht 5' 4" (1.626 m)   Wt 48.2 kg (106 lb 4.2 oz)   LMP 09/15/2022   BMI 18.24 kg/m²     Physical Exam  Significantly underweight    Ultrasound performed. See viewpoint for full ultrasound report.    Significant labs/imaging:  Reviewed in Epic    The left ventricle is normal in size with concentric remodeling and normal systolic function.  The estimated ejection fraction is 60%.  Normal left ventricular diastolic function.  Normal right ventricular size with mildly to moderately reduced " right ventricular systolic function.  There is moderate prolapse of the anterior tricuspid leaflet.  Moderate to severe tricuspid regurgitation.  Normal central venous pressure (3 mmHg).  The estimated PA systolic pressure is 27 mmHg.  Mild mitral regurgitation.    ASSESSMENT/PLAN:     32 y.o.  female with IUP at 24w5d    Poor fetal growth affecting management of mother in second trimester  The patients fetus has been diagnosed with fetal growth restriction (FGR) based on an EFW less than the 10th percentile. Today on ultrasound, the EFW is at the 3rd percentile. The amniotic fluid volume is normal. UA dopplers are normal. 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 The survey remains incomplete. Potential etiologies of FGR include but are not limited to normal variation of stature, placental insufficiency, chromosomal abnormalities, genetic disorders, infections, medical conditions, teratogen exposure and other etiologies.  We discussed the increased risk of stillbirth and the potential need for earlier delivery. The patient has had low risk cfDNA. TORCH titers indicate a prior immunity to CMV.    Due to the diagnosis of early FGR which has a higher likelihood of association of chromosomal or genetic disorders, please see the following recommendations:  Monitor for signs and symptoms of preeclampsia   Patient desires amniocentesis, this will be scheduled in the next 2-3 weeks  Genetic counseling referral-will need to discuss possible skeletal dysplasia panel to be obtained at time of amniocentesis.  Consider testing for CMV at time of amniocentesis  Due to concerns of a possible skeletal dysplasia, a fetal echocardiogram was ordered.   The patient should refrain from smoking and using illicit drugs as these are modifiable risk factors for FGR  If FGR persists, as pregnancy progresses,  fetal surveillance, UA Doppler testing, and serial  growth ultrasounds will be performed.  Obtain UA Doppler with primary OB next week  The patient has elected to defer  testing until further in gestation. This should be started by 28 weeks.  Delivery timing to be determined based on follow up ultrasounds.        Nausea and vomiting in pregnancy  Patient states severe N/V. Works at [employer] and is nauseated most of day. Losing weight.  Start Boost supplements.  Discussed hospitalization may be needed for IVF, patient declines.  Recommend primary OB refer patient to nutritionist.    Maternal red cell alloimmunization, antepartum  Anti-FyA and Anti-E  Last titer  too weak to titer.  Consider genotype at time of amniocentesis.  In interim, please continue to obtain antibody titers every 4 weeks with primary OB (next due in 1 week). This should be drawn at next prenatal visit, which is scheduled for 3/7.    Previous deep vein thrombosis (DVT) affecting pregnancy in second trimester  Recommendations for primary OB:  Please refer to initial consult for full details  Anticardiolipin inconclusive-please repeat > 12 weeks from initial lab draw  Please obtain prothrombin [id number] mutation and Factor V Leiden mutation analysis  Continue lovenox 40 mg daily and aspirin 81 mg daily.  HOLD LOVENOX 12-24 HOURS PRIOR TO AMNIOCENTESIS. Called listed number-no answer. Generic voicemail left to return call to [facility] clinic.    History of  delivery, currently pregnant  Per prior consultation. Rx for vaginal progesterone-does not appear as has been filled.   Monitor for signs/symptoms of PTL.    Habitual aborter  Anticardiolipin inconclusive.  Cont lovenox, aspirin.  Recommend primary OB repeat anticardiolipin lab draw > 12 weeks from initial draw.    Abnormal fetal ultrasound  Please see prior counseling.  Today's ultrasound is consistent with early onset FGR with micromelia with approximately 4 week lag in biometry for long bones. Much of the anatomy remains  "suboptimal due to fetal position (evidently in similar position as to prior visit).   TORCH titers were consistent with a likely prior history of CMV (IgG positive, IgM neg); although a periconceptual infection cannot be ruled out.  We discussed the potential for there to be an underlying skeletal dysplasia or other genetic syndrome. The patient has previously been counseled by Dr. Damon and desires an amniocentesis. We will assist in scheduling this.  Will schedule fetal echo.   Will recommend fetal appointments/ultrasound appointments be scheduled separately from maternal issues in the future given complexity of maternal and fetal conditions.  Will schedule genetic counseling visit prior to amniocentesis.    Hepatitis C virus infection without hepatic coma  Viral load: [id number]  LFTS 1 month ago: normal  STI screening: + trichomonas 2022-defer to primary OB, otherwise negative    Per prior visit with Dr. Damon:    Recommendations:  Repeat screen for HIV infection, hepatitis A and hepatitis B infection, and other sexually transmitted diseases (syphilis, gonorrhea, and chlamydia) in 3rd trimester  Vaccinate for hepatitis A and hepatitis B if non-immune  Repeat LFTs q 1-2 months with primary OB  Refer to hepatology for evaluation and consideration of treatment postpartum  Mode of delivery per other obstetric indications.  Avoid invasive fetal procedures intrapartum (early amniotomy, fetal scalp electrode, and episiotomy) unless necessary.  Breastfeeding is not contraindicated (recommend abstain from breastfeeding if nipples are bleeding or cracked).  Evaluation of  by pediatricians after delivery.     Patient previously counseled on limited data re: vertical transmission of HCV in setting of amniocentesis. Per prior note:   "Amniocentesis does not appear to increase the risk of vertical transmission, although this is based on limited information. If invasive prenatal diagnosis is undertaken, patient was " "counseled that date on vertical transmission is reassuring, albeit limited."    History of loop electrical excision procedure (LEEP)  ERNST 2-3 5/2022    Recommend primary OB assess whether repeat pap is indicated.    History of endocarditis  Please see prior note.  Patient had echo (see above).  Has not yet seen Dr. Nieves. Has appointment with him on 3/13.  Denies any cardiac symptoms or complaints currently.  Will  pending cardiology consultation.    Recurrent major depressive disorder  Per psychiatry and primary OB    History of drug abuse in remission  UDS neg 1/2023. Will defer to primary OB.    F/u 3/6-GC, 3/14-amnio, 3/30-MFM    >60 minutes of total time spent on the encounter, which includes face to face time and non-face to face time preparing to see the patient (eg, review of tests), obtaining and/or reviewing separately obtained history, documenting clinical information in the electronic or other health record, independently interpreting results (not separately reported) and communicating results to the patient/family/caregiver, or care coordination (not separately reported).    "

## 2023-02-27 NOTE — ASSESSMENT & PLAN NOTE
Per prior consultation. Rx for vaginal progesterone-does not appear as has been filled.   Monitor for signs/symptoms of PTL.

## 2023-02-28 ENCOUNTER — TELEPHONE (OUTPATIENT)
Dept: MATERNAL FETAL MEDICINE | Facility: CLINIC | Age: 33
End: 2023-02-28
Payer: MEDICAID

## 2023-02-28 ENCOUNTER — TELEPHONE (OUTPATIENT)
Dept: OBSTETRICS AND GYNECOLOGY | Facility: CLINIC | Age: 33
End: 2023-02-28
Payer: MEDICAID

## 2023-02-28 ENCOUNTER — PATIENT MESSAGE (OUTPATIENT)
Dept: MATERNAL FETAL MEDICINE | Facility: CLINIC | Age: 33
End: 2023-02-28
Payer: MEDICAID

## 2023-02-28 DIAGNOSIS — O36.5920 POOR FETAL GROWTH AFFECTING MANAGEMENT OF MOTHER IN SECOND TRIMESTER, SINGLE OR UNSPECIFIED FETUS: Primary | ICD-10-CM

## 2023-02-28 NOTE — TELEPHONE ENCOUNTER
Left message for patient to return call to [phone].    Ultrasound scheduled at 1:30 at [facility] on 3/7

## 2023-02-28 NOTE — TELEPHONE ENCOUNTER
----- Message from Estela Olivares RN sent at 2/28/2023  7:47 AM CST -----  Contact: Norma    ----- Message -----  From: Yudi Mejia RN  Sent: 2/28/2023   7:33 AM CST  To: Estela Olivares RN      ----- Message -----  From: Areli Mckenna  Sent: 2/28/2023   7:10 AM CST  To: [facility] Maternal Fetal Medicine Staff    Norma needs a call back at [phone], Regards to her My Chart megs for appointment verification.    Thanks  Td

## 2023-02-28 NOTE — TELEPHONE ENCOUNTER
Call to patient and gave her date and time of all her upcoming Maternal Fetal Medicine appointments. Gave her location of [facility] for her upcoming appointment. She wrote them down and explained to patient they would be in [facility] under visits. Explained to patient about virtual visit with Elida and to be sure to log in through the jessica a few min prior to appointment.  She stated understanding. Explained if she had any questions to please call the office.

## 2023-03-02 ENCOUNTER — TELEPHONE (OUTPATIENT)
Dept: MATERNAL FETAL MEDICINE | Facility: CLINIC | Age: 33
End: 2023-03-02
Payer: MEDICAID

## 2023-03-07 ENCOUNTER — OFFICE VISIT (OUTPATIENT)
Dept: MATERNAL FETAL MEDICINE | Facility: CLINIC | Age: 33
End: 2023-03-07
Payer: MEDICAID

## 2023-03-07 ENCOUNTER — ROUTINE PRENATAL (OUTPATIENT)
Dept: OBSTETRICS AND GYNECOLOGY | Facility: CLINIC | Age: 33
End: 2023-03-07
Payer: MEDICAID

## 2023-03-07 ENCOUNTER — PROCEDURE VISIT (OUTPATIENT)
Dept: OBSTETRICS AND GYNECOLOGY | Facility: CLINIC | Age: 33
End: 2023-03-07
Payer: MEDICAID

## 2023-03-07 VITALS
DIASTOLIC BLOOD PRESSURE: 60 MMHG | WEIGHT: 110.44 LBS | SYSTOLIC BLOOD PRESSURE: 104 MMHG | BODY MASS INDEX: 18.96 KG/M2

## 2023-03-07 DIAGNOSIS — O09.899 HISTORY OF PRETERM DELIVERY, CURRENTLY PREGNANT: ICD-10-CM

## 2023-03-07 DIAGNOSIS — Z3A.25 25 WEEKS GESTATION OF PREGNANCY: ICD-10-CM

## 2023-03-07 DIAGNOSIS — O09.892 PREVIOUS DEEP VEIN THROMBOSIS (DVT) AFFECTING PREGNANCY IN SECOND TRIMESTER: Primary | ICD-10-CM

## 2023-03-07 DIAGNOSIS — O34.219 HISTORY OF CESAREAN DELIVERY, CURRENTLY PREGNANT: ICD-10-CM

## 2023-03-07 DIAGNOSIS — Z86.718 PREVIOUS DEEP VEIN THROMBOSIS (DVT) AFFECTING PREGNANCY IN SECOND TRIMESTER: Primary | ICD-10-CM

## 2023-03-07 DIAGNOSIS — O28.3 ABNORMAL FETAL ULTRASOUND: Primary | ICD-10-CM

## 2023-03-07 DIAGNOSIS — R05.3 CHRONIC COUGH: ICD-10-CM

## 2023-03-07 DIAGNOSIS — O36.1990 MATERNAL RED CELL ALLOIMMUNIZATION, ANTEPARTUM, SINGLE OR UNSPECIFIED FETUS: ICD-10-CM

## 2023-03-07 DIAGNOSIS — O36.5920 POOR FETAL GROWTH AFFECTING MANAGEMENT OF MOTHER IN SECOND TRIMESTER, SINGLE OR UNSPECIFIED FETUS: Primary | ICD-10-CM

## 2023-03-07 DIAGNOSIS — U07.1 COVID-19: ICD-10-CM

## 2023-03-07 DIAGNOSIS — R76.0 ANTI-CARDIOLIPIN ANTIBODY POSITIVE: ICD-10-CM

## 2023-03-07 DIAGNOSIS — O36.5920 POOR FETAL GROWTH AFFECTING MANAGEMENT OF MOTHER IN SECOND TRIMESTER, SINGLE OR UNSPECIFIED FETUS: ICD-10-CM

## 2023-03-07 PROBLEM — O21.9 NAUSEA AND VOMITING IN PREGNANCY: Status: RESOLVED | Noted: 2023-01-23 | Resolved: 2023-03-07

## 2023-03-07 PROCEDURE — 99214 PR OFFICE/OUTPT VISIT, EST, LEVL IV, 30-39 MIN: ICD-10-PCS | Mod: TH,S$PBB,, | Performed by: MIDWIFE

## 2023-03-07 PROCEDURE — 99999 PR PBB SHADOW E&M-EST. PATIENT-LVL II: ICD-10-PCS | Mod: PBBFAC,,, | Performed by: MIDWIFE

## 2023-03-07 PROCEDURE — 99499 NO LOS: ICD-10-PCS | Mod: 95,,, | Performed by: GENETIC COUNSELOR, MS

## 2023-03-07 PROCEDURE — 76820 US OB/GYN PROCEDURE (VIEWPOINT): ICD-10-PCS | Mod: 26,S$PBB,, | Performed by: OBSTETRICS & GYNECOLOGY

## 2023-03-07 PROCEDURE — 76820 UMBILICAL ARTERY ECHO: CPT | Mod: PBBFAC | Performed by: OBSTETRICS & GYNECOLOGY

## 2023-03-07 PROCEDURE — 99212 OFFICE O/P EST SF 10 MIN: CPT | Mod: PBBFAC,TH | Performed by: MIDWIFE

## 2023-03-07 PROCEDURE — 99214 OFFICE O/P EST MOD 30 MIN: CPT | Mod: TH,S$PBB,, | Performed by: MIDWIFE

## 2023-03-07 PROCEDURE — 96040 PR GENETIC COUNSELING, EACH 30 MIN: CPT | Mod: 95,,, | Performed by: GENETIC COUNSELOR, MS

## 2023-03-07 PROCEDURE — 99499 UNLISTED E&M SERVICE: CPT | Mod: 95,,, | Performed by: GENETIC COUNSELOR, MS

## 2023-03-07 PROCEDURE — 99999 PR PBB SHADOW E&M-EST. PATIENT-LVL II: CPT | Mod: PBBFAC,,, | Performed by: MIDWIFE

## 2023-03-07 PROCEDURE — 96040 PR GENETIC COUNSELING, EACH 30 MIN: ICD-10-PCS | Mod: 95,,, | Performed by: GENETIC COUNSELOR, MS

## 2023-03-07 RX ORDER — AZITHROMYCIN 250 MG/1
TABLET, FILM COATED ORAL
Qty: 6 TABLET | Refills: 0 | Status: SHIPPED | OUTPATIENT
Start: 2023-03-07 | End: 2023-03-12

## 2023-03-07 NOTE — PROGRESS NOTES
Office Visit - Genetic Counseling Evaluation   Norma De La Garza  : 1990  MRN: [id number]  REFERRING PROVIDER: Moose Hendrix  PARTNER NAME: Mendel    DATE OF SERVICE: 3/7/23  TIME SPENT: 45 min    REASON FOR CONSULT:  Norma De La Garza, a 32 y.o. 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 She came to the appointment alone.     OBSETRIC HISTORY   AGE AT FER: 32y  FER: 2023  GESTATION: [place]  GESTATIONAL AGE:25w6d    PREGNANCY HISTORY  G1: SAB  G2: 32w- Twin- CS  G3: 17w- IUFD   G4: SAB  G5: SAB  G6: 32w- CS  G7: SAB  G8: SAB  G9: SAB  G10:Current    MEDICAL HISTORY:  MEDICATIONS/EXPOSURES: Not discussed today  Patient Active Problem List   Diagnosis    Hepatitis C virus infection without hepatic coma    Previous deep vein thrombosis (DVT) affecting pregnancy in second trimester    Anxiety    Bipolar 1 disorder    Recurrent major depressive disorder    History of endocarditis    History of loop electrical excision procedure (LEEP)    History of  delivery, currently pregnant    Habitual aborter    Maternal red cell alloimmunization, antepartum    Tobacco use in pregnancy, antepartum    BV (bacterial vaginosis)    Trichomonal vaginitis during pregnancy in first trimester    History of drug abuse in remission    Poor fetal growth affecting management of mother in second trimester    Abnormal fetal ultrasound    History of intrauterine growth restriction in prior pregnancy, currently pregnant    History of  delivery, currently pregnant       Current Outpatient Medications:     aspirin (ECOTRIN) 81 MG EC tablet, Take 1 tablet (81 mg total) by mouth once daily., Disp: 90 tablet, Rfl: 3    enoxaparin (LOVENOX) 40 mg/0.4 mL Syrg, Inject 0.4 mLs (40 mg total) into the skin once daily., Disp: 12 mL, Rfl: 6    ondansetron (ZOFRAN-ODT) 4 MG TbDL, Take 1 tablet (4 mg total) by mouth  every 12 (twelve) hours as needed (nausea)., Disp: 30 tablet, Rfl: 3    prenatal 25/iron fum/folic/dha (PRENATAL-1 ORAL), Take by mouth., Disp: , Rfl:     progesterone (PROMETRIUM) 200 MG capsule, Place 1 capsule (200 mg total) vaginally nightly. (Patient not taking: Reported on 3/7/2023), Disp: 30 capsule, Rfl: 6    promethazine (PHENERGAN) 25 MG tablet, Take 1 tablet (25 mg total) by mouth every 4 (four) hours as needed for Nausea., Disp: 30 tablet, Rfl: 3    QUEtiapine (SEROQUEL) 50 MG tablet, Take 1 tablet (50 mg total) by mouth nightly., Disp: 30 tablet, Rfl: 11     FAMILY HISTORY:  Please see scanned pedigree in patient's chart under media. Patient's ancestry is . FOB ancestry is . Consanguinity was denied.     This is the first pregnancy between Norma and her partner Mendel. Norma has three living children with previous partners who are healthy to her knowledge. Mendel has three living children with previous partners who are also healthy. Norma has one full sister with 5 children who are all healthy. Her mother passed away from an overdose. Her father has mental health issues, including Schizoprenia, as well as glaucoma. Norma also states people have asked if her father has Down Syndrome, she describes his mental state as being 'different'. Norma has two maternal half sisters who she does not have contact with. Mendel has three living brothers and a sister who passed away from cancer of unknown type in her 30's. His brothers all overuse alcohol. His parents are still living, he has several paternal half siblings but Norma knows little about their health.     Additional history negative for multiple miscarriages/stillbirth, developmental delay/intellectual disability, and known genetic disorders. Complete pedigree will be linked to this encounter and can be viewed under the Media tab. The information provided is based on the patient and/or their reproductive partner's recollection  of the family history and in the absence of complete medical records. If the family history changes or if more information is obtained, they were asked to contact us as this may alter the recommendations or impression of the family history.     PAST TESTING  Patient carrier screening: Low risk CF and Hemoglobin electrophoresis  Reproductive partner carrier screening: NA  Parental Karyotypes: NA    PREGNANCY TESTING  cfDNA for aneuploidy: Low risk   Diagnostic testing: NA  Fetal karyotype: NA    COUNSELING:   Possible skeletal dysplasia  Norma 's anatomic ultrasound was suggestive of a fetal skeletal dysplasia. The ultrasound noted shortened long bones, lagging by about 4w.     Skeletal dysplasias account for 5% of all genetic disorders seen in the  period. The three most common lethal skeletal dysplasias are  lethal osteogenesis imperfecta (formerly Type II), thanatophoric dysplasia and achondrogenesis type II. Achondroplasia is the most common non-lethal skeletal dysplasia. Deceleration of long bone growth in achondroplasia is not typically identified until after the 20th week of gestation. As a result, it is less likely to be diagnosed prior to birth.  There are 436 skeletal dysplasia disorders with 364 causative genes identified. Approximately 50 of these conditions are evident prior to birth. Autosomal recessive, autosomal dominant, X-linked recessive, X-linked dominant, and Y-linked inheritance have all been associated with skeletal dysplasias. Additionally, some disorders are associated with imprinting disorders and teratogenic exposures.     Recurrence risks are highly dependent on the underlying cause of the skeletal dysplasia. If a specific diagnosis is not obtained, exact recurrence risks may not be available for future pregnancies. If a molecular diagnosis is not obtained, diagnostic testing in future pregnancies may not be available. Chromosomal microarray analysis (CMA) can be  performed as the primary test and can replace conventional karyotype. For patients undergoing prenatal diagnosis for the indication of a fetal structural abnormality, this may be a useful first line test. Following a negative CMA, a prenatal skeletal dysplasia panel may be done to further evaluate for a possible skeletal dysplasia. The genes included in this panel are associated with skeletal dysplasias that are known to have abnormal prenatal ultrasound findings.       Norma has previously completed cfDNA screening, which was negative, significantly reducing the risk for one of the common fetal aneuploidies.     Prenatal diagnostic testing options for definitive diagnosis of cytogenetic abnormalities (amniocentesis) were discussed. The procedure-related risk for pregnancy loss of ~1:900 for amniocentesis was reviewed. For this indication, analysis of chorionic villi or amniocytes may include panel testing for genetic causes of skeletal dysplasia. Potential test results, including variants of unknown significance were reviewed. Turn-around time for this testing was discussed.    A referral to a pediatric geneticist after birth is recommended if prenatal genetic diagnosis is not pursued, does not lead to a diagnosis, or if additional abnormalities, dysmorphic features, and/or developmental delays are noted at delivery. In cases of intrauterine or  demise, or in cases of pregnancy termination, an autopsy should be offered. If not previously performed, CMA on fetal or placental tissue should be considered. Additional genetic testing should be recommended as indicated by clinical findings and/or family history. If a genetic etiology is strongly suspected, DNA banking for the purposes of future genetic testing should be considered.    After discussing the above information in detail, Norma decided to proceed with amniocentesis.      DISCUSSION & IMPRESSION:  Norma is a 32 y.o. female with a suspected fetal  skeletal dysplasia identified on fetal ultrasounds and specifically noting mesomelia of long bones. We discussed the above information in great detail and Norma asked questions about genetic testing to help understand potential outcomes of this testing. She expressed that she is having a hard time in this pregnancy and that she is smoking. She states that she is cutting back but her home life is very stressful. She is seeing a counselor which she says is very helpful, she was encouraged to continue seeing this provider. She says that she is starting to understand that there is something different affecting this pregnancy than her others since they did not have ultrasound findings. We also discussed skeletal dysplasias including basic descriptions. She agreed to amniocentesis which is scheduled for 3/14 with Dr. Kearns.     TESTING OPTIONS  Diagnostic Testing: Amniocentesis  Carrier Screening: NA  Pregnancy Options: Termination was not reviewed today  Recurrence Risk: To be determined  Procedures/labs DECLINED today: NA    Norma stated that she is eager to try to understand what is causing the shortening of her baby's long bones and she knows that genetic testing could help with this.     We reviewed Norma's medical and family history. We discussed basics of genetics and skeletal dysplasias. Norma was understanding of the information discussed in clinic and all questions were answered.     Please see Dr. Parker and Dr. Damon's note for detailed information regarding ultrasound findings, plan of care and diagnostic considerations.    RECOMMENDATIONS:  Amniocentesis FISH with reflex to array if normal and chromosomes if abnormal, concurrent skeletal dysplasia panel- all testing to [facility]  Maternal cell contamination studies (3-5mL in Lavender Tube)  Paternal sample for segregation studies (3-5 mL in Lavender Tube)    Elida Valentine MS, [facility] [place]  Licensed, Certified Genetic Counselor  [facility]    The patient  location is: Home ([place])  The chief complaint leading to consultation is: Fetal anomalies    Visit type: audiovisual    Face to Face time with patient: 45 min  65 minutes of total time spent on the encounter, which includes face to face time and non-face to face time preparing to see the patient (eg, review of tests), Obtaining and/or reviewing separately obtained history, Documenting clinical information in the electronic or other health record, Independently interpreting results (not separately reported) and communicating results to the patient/family/caregiver, or Care coordination (not separately reported).         Each patient to whom he or she provides medical services by telemedicine is:  (1) informed of the relationship between the physician and patient and the respective role of any other health care provider with respect to management of the patient; and (2) notified that he or she may decline to receive medical services by telemedicine and may withdraw from such care at any time.    Notes:

## 2023-03-08 ENCOUNTER — PATIENT MESSAGE (OUTPATIENT)
Dept: OTHER | Facility: OTHER | Age: 33
End: 2023-03-08
Payer: MEDICAID

## 2023-03-08 PROBLEM — R76.0 ANTI-CARDIOLIPIN ANTIBODY POSITIVE: Status: ACTIVE | Noted: 2023-03-08

## 2023-03-10 ENCOUNTER — TELEPHONE (OUTPATIENT)
Dept: MATERNAL FETAL MEDICINE | Facility: CLINIC | Age: 33
End: 2023-03-10
Payer: MEDICAID

## 2023-03-10 NOTE — TELEPHONE ENCOUNTER
RN spoke with patient and explained that she will need to hold her lovenox 24hours before her Amnio procedure next Tuesday. Patient verbalized understanding.

## 2023-03-13 ENCOUNTER — PATIENT MESSAGE (OUTPATIENT)
Dept: MATERNAL FETAL MEDICINE | Facility: CLINIC | Age: 33
End: 2023-03-13
Payer: MEDICAID

## 2023-03-13 ENCOUNTER — HOSPITAL ENCOUNTER (OUTPATIENT)
Dept: CARDIOLOGY | Facility: HOSPITAL | Age: 33
Discharge: HOME | End: 2023-03-13
Attending: INTERNAL MEDICINE
Payer: MEDICAID

## 2023-03-13 ENCOUNTER — OFFICE VISIT (OUTPATIENT)
Dept: CARDIOLOGY | Facility: CLINIC | Age: 33
End: 2023-03-13
Payer: MEDICAID

## 2023-03-13 VITALS
OXYGEN SATURATION: 96 % | BODY MASS INDEX: 18.78 KG/M2 | HEIGHT: 64 IN | WEIGHT: 110 LBS | SYSTOLIC BLOOD PRESSURE: 130 MMHG | DIASTOLIC BLOOD PRESSURE: 88 MMHG | HEART RATE: 97 BPM

## 2023-03-13 DIAGNOSIS — O09.892 PREVIOUS DEEP VEIN THROMBOSIS (DVT) AFFECTING PREGNANCY IN SECOND TRIMESTER: ICD-10-CM

## 2023-03-13 DIAGNOSIS — I51.9 RIGHT VENTRICULAR DYSFUNCTION: ICD-10-CM

## 2023-03-13 DIAGNOSIS — O99.330 TOBACCO USE IN PREGNANCY, ANTEPARTUM: ICD-10-CM

## 2023-03-13 DIAGNOSIS — Z86.79 HISTORY OF ENDOCARDITIS: Primary | ICD-10-CM

## 2023-03-13 DIAGNOSIS — I36.1 NONRHEUMATIC TRICUSPID VALVE REGURGITATION: ICD-10-CM

## 2023-03-13 DIAGNOSIS — F19.11 HISTORY OF DRUG ABUSE IN REMISSION: ICD-10-CM

## 2023-03-13 DIAGNOSIS — Z86.718 PREVIOUS DEEP VEIN THROMBOSIS (DVT) AFFECTING PREGNANCY IN SECOND TRIMESTER: ICD-10-CM

## 2023-03-13 PROCEDURE — 93005 ELECTROCARDIOGRAM TRACING: CPT

## 2023-03-13 PROCEDURE — 3008F PR BODY MASS INDEX (BMI) DOCUMENTED: ICD-10-PCS | Mod: CPTII,,, | Performed by: INTERNAL MEDICINE

## 2023-03-13 PROCEDURE — 3079F DIAST BP 80-89 MM HG: CPT | Mod: CPTII,,, | Performed by: INTERNAL MEDICINE

## 2023-03-13 PROCEDURE — 3075F PR MOST RECENT SYSTOLIC BLOOD PRESS GE 130-139MM HG: ICD-10-PCS | Mod: CPTII,,, | Performed by: INTERNAL MEDICINE

## 2023-03-13 PROCEDURE — 3075F SYST BP GE 130 - 139MM HG: CPT | Mod: CPTII,,, | Performed by: INTERNAL MEDICINE

## 2023-03-13 PROCEDURE — 3079F PR MOST RECENT DIASTOLIC BLOOD PRESSURE 80-89 MM HG: ICD-10-PCS | Mod: CPTII,,, | Performed by: INTERNAL MEDICINE

## 2023-03-13 PROCEDURE — 1160F RVW MEDS BY RX/DR IN RCRD: CPT | Mod: CPTII,,, | Performed by: INTERNAL MEDICINE

## 2023-03-13 PROCEDURE — 93010 ELECTROCARDIOGRAM REPORT: CPT | Mod: ,,, | Performed by: INTERNAL MEDICINE

## 2023-03-13 PROCEDURE — 99213 OFFICE O/P EST LOW 20 MIN: CPT | Mod: PBBFAC | Performed by: INTERNAL MEDICINE

## 2023-03-13 PROCEDURE — 99204 OFFICE O/P NEW MOD 45 MIN: CPT | Mod: S$PBB,,, | Performed by: INTERNAL MEDICINE

## 2023-03-13 PROCEDURE — 1159F PR MEDICATION LIST DOCUMENTED IN MEDICAL RECORD: ICD-10-PCS | Mod: CPTII,,, | Performed by: INTERNAL MEDICINE

## 2023-03-13 PROCEDURE — 99999 PR PBB SHADOW E&M-EST. PATIENT-LVL III: CPT | Mod: PBBFAC,,, | Performed by: INTERNAL MEDICINE

## 2023-03-13 PROCEDURE — 1159F MED LIST DOCD IN RCRD: CPT | Mod: CPTII,,, | Performed by: INTERNAL MEDICINE

## 2023-03-13 PROCEDURE — 3008F BODY MASS INDEX DOCD: CPT | Mod: CPTII,,, | Performed by: INTERNAL MEDICINE

## 2023-03-13 PROCEDURE — 99999 PR PBB SHADOW E&M-EST. PATIENT-LVL III: ICD-10-PCS | Mod: PBBFAC,,, | Performed by: INTERNAL MEDICINE

## 2023-03-13 PROCEDURE — 1160F PR REVIEW ALL MEDS BY PRESCRIBER/CLIN PHARMACIST DOCUMENTED: ICD-10-PCS | Mod: CPTII,,, | Performed by: INTERNAL MEDICINE

## 2023-03-13 PROCEDURE — 93010 EKG 12-LEAD: ICD-10-PCS | Mod: ,,, | Performed by: INTERNAL MEDICINE

## 2023-03-13 PROCEDURE — 99204 PR OFFICE/OUTPT VISIT, NEW, LEVL IV, 45-59 MIN: ICD-10-PCS | Mod: S$PBB,,, | Performed by: INTERNAL MEDICINE

## 2023-03-13 NOTE — PROGRESS NOTES
Subjective:   Patient ID:  Norma De La Garza is a 32 y.o. female who presents for evaluation of No chief complaint on file.      31 yo female referred for h/o IE  Now 25 weeks pregnancy. Last pregnancy in    PMH h/o IE TV vegetation in 2019 with 6 weeks ABx rx, h/o heroin abuse 10 yrs, last time in . H/o PE switched from xeratol to lovenox after pregnancy. Smoking,   Primary Dr. Little cardiologist last seen 6 months ago at [facility]  [employer] [facility] [employer]  Some SOB wheezing  No fever and chill   echo done at [facility]  Moderate tricuspid regurgitation. There is a 1.1 x 1.2 cm mobile echodensity noted on the atrial surface of the tricuspid valve.  No evidence of additional valve involvement of presumed endocarditis by   available views and Doppler.   · Normal wall motion. Normal (55-65%) ejection fraction.   · Normal left ventricle diastolic function.   · Normal right ventricle cavity size and ejection fraction.    echo   · The left ventricle is normal in size with concentric remodeling and normal systolic function.  · The estimated ejection fraction is 60%.  · Normal left ventricular diastolic function.  · Normal right ventricular size with mildly to moderately reduced right ventricular systolic function.  · There is moderate prolapse of the anterior tricuspid leaflet.  · Moderate to severe tricuspid regurgitation.  · Normal central venous pressure (3 mmHg).  · The estimated PA systolic pressure is 27 mmHg.  · Mild mitral regurgitation.            Past Medical History:   Diagnosis Date    Abnormal Pap smear of cervix     Deep vein thrombosis     Encounter for blood transfusion     Hepatitis B carrier     Hepatitis C        Past Surgical History:   Procedure Laterality Date     SECTION      CONIZATION OF CERVIX USING LOOP ELECTROSURGICAL EXCISION PROCEDURE (LEEP) N/A 2022    Procedure: LEEP CONIZATION, CERVIX;  Surgeon: DESTINY Mariee MD;  Location: [facility] OR;  Service: OB/GYN;   Laterality: N/A;    DILATION AND CURETTAGE OF UTERUS      jaw reconstruction  2008    TONSILLECTOMY         Social History     Tobacco Use    Smoking status: Former     Packs/day: 1.00     Types: Cigarettes     Quit date: 2022     Years since quittin.8    Smokeless tobacco: Current   Substance Use Topics    Alcohol use: No    Drug use: Not Currently     Comment: Heroine       History reviewed. No pertinent family history.    Review of Systems   Constitutional: Negative for decreased appetite, diaphoresis, fever, malaise/fatigue and night sweats.   HENT:  Negative for nosebleeds.    Eyes:  Negative for blurred vision and double vision.   Cardiovascular:  Negative for chest pain, claudication, dyspnea on exertion, irregular heartbeat, leg swelling, near-syncope, orthopnea, palpitations, paroxysmal nocturnal dyspnea and syncope.   Respiratory:  Positive for wheezing. Negative for cough, shortness of breath, sleep disturbances due to breathing, snoring and sputum production.    Endocrine: Negative for cold intolerance and polyuria.   Hematologic/Lymphatic: Does not bruise/bleed easily.   Skin:  Negative for rash.   Musculoskeletal:  Positive for back pain. Negative for falls, joint pain, joint swelling and neck pain.   Gastrointestinal:  Negative for abdominal pain, heartburn, nausea and vomiting.   Genitourinary:  Negative for dysuria, frequency and hematuria.   Neurological:  Negative for difficulty with concentration, dizziness, focal weakness, headaches, light-headedness, numbness, seizures and weakness.   Psychiatric/Behavioral:  Negative for depression, memory loss and substance abuse. The patient does not have insomnia.    Allergic/Immunologic: Negative for HIV exposure and hives.     Objective:   Physical Exam  HENT:      Head: Normocephalic.   Eyes:      Pupils: Pupils are equal, round, and reactive to light.   Neck:      Thyroid: No thyromegaly.      Vascular: Normal carotid pulses. No carotid bruit  or JVD.   Cardiovascular:      Rate and Rhythm: Normal rate and regular rhythm. No extrasystoles are present.     Chest Wall: PMI is not displaced.      Pulses: Normal pulses.      Heart sounds: Murmur heard.   High-pitched blowing holosystolic murmur is present at the lower left sternal border and apex.     No gallop. No S3 sounds.   Pulmonary:      Effort: No respiratory distress.      Breath sounds: Normal breath sounds. No stridor.   Abdominal:      General: Bowel sounds are normal.      Palpations: Abdomen is soft.      Tenderness: There is no abdominal tenderness. There is no rebound.   Musculoskeletal:         General: Normal range of motion.   Skin:     Findings: No rash.   Neurological:      Mental Status: She is alert and oriented to person, place, and time.   Psychiatric:         Behavior: Behavior normal.     No results found for: CHOL  No results found for: HDL  No results found for: LDLCALC  No results found for: TRIG  No results found for: CHOLHDL    Chemistry        Component Value Date/Time     (L) 01/25/2023 0930    K 4.3 01/25/2023 0930     01/25/2023 0930    CO2 22 (L) 01/25/2023 0930    BUN 6 01/25/2023 0930    CREATININE 0.6 01/25/2023 0930     01/25/2023 0930        Component Value Date/Time    CALCIUM 9.1 01/25/2023 0930    ALKPHOS 147 (H) 01/25/2023 0930    AST 29 01/25/2023 0930    ALT 36 01/25/2023 0930    BILITOT 0.3 01/25/2023 0930    ESTGFRAFRICA >60 06/24/2022 0934    EGFRNONAA >60 06/24/2022 0934          No results found for: LABA1C, HGBA1C  No results found for: TSH  Lab Results   Component Value Date    INR 1.0 06/15/2022     Lab Results   Component Value Date    WBC 7.79 12/05/2022    HGB 12.4 12/05/2022    HCT 37.6 12/05/2022    MCV 89 12/05/2022     12/05/2022     BNP  @LABRCNTIP(BNP,BNPTRIAGEBLO)@  CrCl cannot be calculated (Patient's most recent lab result is older than the maximum 7 days allowed.).  No results found in the last 24 hours.  No results  found in the last 24 hours.  No results found in the last 24 hours.    Assessment:      1. History of endocarditis    2. History of drug abuse in remission    3. Tobacco use in pregnancy, antepartum      Pregnancy 25 weeks  H/o IE in 2019  H/o heroin abuse, remission since   Rv dysfunction and mod to severe TR    Plan:   Repeat echo in 2 months  Discussed with pt about ABx prophylaxis for any invasive procedure  Smoking cessation  RTC as needed

## 2023-03-14 ENCOUNTER — PROCEDURE VISIT (OUTPATIENT)
Dept: MATERNAL FETAL MEDICINE | Facility: CLINIC | Age: 33
End: 2023-03-14
Payer: MEDICAID

## 2023-03-14 ENCOUNTER — OFFICE VISIT (OUTPATIENT)
Dept: MATERNAL FETAL MEDICINE | Facility: CLINIC | Age: 33
End: 2023-03-14
Payer: MEDICAID

## 2023-03-14 VITALS — BODY MASS INDEX: 18.88 KG/M2 | WEIGHT: 110 LBS | DIASTOLIC BLOOD PRESSURE: 70 MMHG | SYSTOLIC BLOOD PRESSURE: 114 MMHG

## 2023-03-14 DIAGNOSIS — O36.5920 POOR FETAL GROWTH AFFECTING MANAGEMENT OF MOTHER IN SECOND TRIMESTER, SINGLE OR UNSPECIFIED FETUS: ICD-10-CM

## 2023-03-14 DIAGNOSIS — O36.1990 MATERNAL RED CELL ALLOIMMUNIZATION, ANTEPARTUM, SINGLE OR UNSPECIFIED FETUS: ICD-10-CM

## 2023-03-14 DIAGNOSIS — O28.3 ABNORMAL FETAL ULTRASOUND: Primary | ICD-10-CM

## 2023-03-14 DIAGNOSIS — O28.3 ABNORMAL FETAL ULTRASOUND: ICD-10-CM

## 2023-03-14 DIAGNOSIS — Z36.89 ENCOUNTER FOR ULTRASOUND TO ASSESS FETAL GROWTH: ICD-10-CM

## 2023-03-14 PROCEDURE — 59000 AMNIOCENTESIS DIAGNOSTIC: CPT | Mod: PBBFAC | Performed by: OBSTETRICS & GYNECOLOGY

## 2023-03-14 PROCEDURE — 99999 PR PBB SHADOW E&M-EST. PATIENT-LVL III: CPT | Mod: PBBFAC,,, | Performed by: OBSTETRICS & GYNECOLOGY

## 2023-03-14 PROCEDURE — 76946 ECHO GUIDE FOR AMNIOCENTESIS: CPT | Mod: PBBFAC | Performed by: OBSTETRICS & GYNECOLOGY

## 2023-03-14 PROCEDURE — 76816 OB US FOLLOW-UP PER FETUS: CPT | Mod: 26,S$PBB,, | Performed by: OBSTETRICS & GYNECOLOGY

## 2023-03-14 PROCEDURE — 3008F BODY MASS INDEX DOCD: CPT | Mod: CPTII,,, | Performed by: OBSTETRICS & GYNECOLOGY

## 2023-03-14 PROCEDURE — 99213 OFFICE O/P EST LOW 20 MIN: CPT | Mod: PBBFAC,TH | Performed by: OBSTETRICS & GYNECOLOGY

## 2023-03-14 PROCEDURE — 76820 UMBILICAL ARTERY ECHO: CPT | Mod: 26,S$PBB,, | Performed by: OBSTETRICS & GYNECOLOGY

## 2023-03-14 PROCEDURE — 76946 PR  SO2 GUIDE AMNIOCENTESIS: ICD-10-PCS | Mod: 26,S$PBB,, | Performed by: OBSTETRICS & GYNECOLOGY

## 2023-03-14 PROCEDURE — 1159F PR MEDICATION LIST DOCUMENTED IN MEDICAL RECORD: ICD-10-PCS | Mod: CPTII,,, | Performed by: OBSTETRICS & GYNECOLOGY

## 2023-03-14 PROCEDURE — 99213 PR OFFICE/OUTPT VISIT, EST, LEVL III, 20-29 MIN: ICD-10-PCS | Mod: 25,S$PBB,TH, | Performed by: OBSTETRICS & GYNECOLOGY

## 2023-03-14 PROCEDURE — 59000 PR AMNIOCENTESIS,DIAGNOSTIC: ICD-10-PCS | Mod: S$PBB,,, | Performed by: OBSTETRICS & GYNECOLOGY

## 2023-03-14 PROCEDURE — 99213 OFFICE O/P EST LOW 20 MIN: CPT | Mod: 25,S$PBB,TH, | Performed by: OBSTETRICS & GYNECOLOGY

## 2023-03-14 PROCEDURE — 76816 OB US FOLLOW-UP PER FETUS: CPT | Mod: PBBFAC | Performed by: OBSTETRICS & GYNECOLOGY

## 2023-03-14 PROCEDURE — 76820 UMBILICAL ARTERY ECHO: CPT | Mod: PBBFAC | Performed by: OBSTETRICS & GYNECOLOGY

## 2023-03-14 PROCEDURE — 3078F DIAST BP <80 MM HG: CPT | Mod: CPTII,,, | Performed by: OBSTETRICS & GYNECOLOGY

## 2023-03-14 PROCEDURE — 76946 ECHO GUIDE FOR AMNIOCENTESIS: CPT | Mod: 26,S$PBB,, | Performed by: OBSTETRICS & GYNECOLOGY

## 2023-03-14 PROCEDURE — 99999 PR PBB SHADOW E&M-EST. PATIENT-LVL III: ICD-10-PCS | Mod: PBBFAC,,, | Performed by: OBSTETRICS & GYNECOLOGY

## 2023-03-14 PROCEDURE — 1159F MED LIST DOCD IN RCRD: CPT | Mod: CPTII,,, | Performed by: OBSTETRICS & GYNECOLOGY

## 2023-03-14 PROCEDURE — 59000 AMNIOCENTESIS DIAGNOSTIC: CPT | Mod: S$PBB,,, | Performed by: OBSTETRICS & GYNECOLOGY

## 2023-03-14 PROCEDURE — 81479 UNLISTED MOLECULAR PATHOLOGY: CPT | Mod: 59

## 2023-03-14 PROCEDURE — 76820 PR US, OB DOPPLER, FETAL UMBILICAL ARTERY ECHO: ICD-10-PCS | Mod: 26,S$PBB,, | Performed by: OBSTETRICS & GYNECOLOGY

## 2023-03-14 PROCEDURE — 30000890 HC MISC. SEND OUT TEST: Mod: 59

## 2023-03-14 PROCEDURE — 3074F PR MOST RECENT SYSTOLIC BLOOD PRESSURE < 130 MM HG: ICD-10-PCS | Mod: CPTII,,, | Performed by: OBSTETRICS & GYNECOLOGY

## 2023-03-14 PROCEDURE — 3008F PR BODY MASS INDEX (BMI) DOCUMENTED: ICD-10-PCS | Mod: CPTII,,, | Performed by: OBSTETRICS & GYNECOLOGY

## 2023-03-14 PROCEDURE — 76816 PR  US,PREGNANT UTERUS,F/U,TRANSABD APP: ICD-10-PCS | Mod: 26,S$PBB,, | Performed by: OBSTETRICS & GYNECOLOGY

## 2023-03-14 PROCEDURE — 3078F PR MOST RECENT DIASTOLIC BLOOD PRESSURE < 80 MM HG: ICD-10-PCS | Mod: CPTII,,, | Performed by: OBSTETRICS & GYNECOLOGY

## 2023-03-14 PROCEDURE — 3074F SYST BP LT 130 MM HG: CPT | Mod: CPTII,,, | Performed by: OBSTETRICS & GYNECOLOGY

## 2023-03-14 NOTE — PROGRESS NOTES
Maternal Fetal Medicine follow up consult    SUBJECTIVE:     Norma De La Garza is a 32 y.o.  female with IUP at 26w6d who is seen in follow up consultation by MFM.  Pregnancy complications include:   No problems updated.    Previous notes reviewed.   No changes to medical, surgical, family, social, or obstetric history.    Interval history since last MFM visit: Patient doing well today with no acute complaints. Considering amniocentesis for diagnostic testing today.    Medications:  Current Outpatient Medications   Medication Instructions    aspirin (ECOTRIN) 81 mg, Oral, Daily    enoxaparin (LOVENOX) 40 mg, Subcutaneous, Daily    ondansetron (ZOFRAN-ODT) 4 mg, Oral, Every 12 hours PRN    prenatal 25/iron fum/folic/dha (PRENATAL-1 ORAL) Oral    progesterone (PROMETRIUM) 200 mg, Vaginal, Nightly    promethazine (PHENERGAN) 25 mg, Oral, Every 4 hours PRN    QUEtiapine (SEROQUEL) 50 mg, Oral, Nightly       Care team members:  Nellie Mariee MD - Primary OB     OBJECTIVE:   /70 (BP Location: Left arm, Patient Position: Sitting)   Wt 49.9 kg (110 lb 0.2 oz)   LMP 09/15/2022   BMI 18.88 kg/m²     Ultrasound performed. See viewpoint for full ultrasound report.  Zultea live IUP at 26 6/7 weeks gestation  FGR is identified on today's study. The EFW plots at the 3%, and the AC plots at the <1%.   The EFW is 539 g.  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   Umbilical artery Doppler S/D ratios are elevated > 99% with persistent forward flow, no absence or reversal of end diastolic flow      ASSESSMENT/PLAN:     32 y.o.  female with IUP at 26w6d    Abnormal fetal ultrasound  -patient presents for amniocentesis only today due to micromelia, early onset severe FGR  -sent for FISH with reflex to CMA if normal; skeletal  dysplasia panel, and fetal RBC antigen testing for Anti-E and Anti-FyA (Kamara)  -kit given to obtain paternal sample for antigen testing, maternal sample sent today  -Follow up in 2 weeks for management of maternal medical comorbidities and discussion regarding initiation of fetal  testing  -Patient to restart lovenox this pm  -Post amniocentesis instructions given  -see viewpoint report for procedure details  -please refer to other consult notes by Dr. Parker/Nelson for further management planning    Follow up MD visit with Dr. Parker on 3/30    Jones Bob MD  PGY 6  Maternal Fetal Medicine  [facility]

## 2023-03-14 NOTE — ASSESSMENT & PLAN NOTE
-patient presents for amniocentesis only today due to micromelia, early onset severe FGR  -sent for FISH with reflex to CMA if normal; skeletal dysplasia panel, and fetal RBC antigen testing for Anti-E and Anti-FyA (Kamara)  -Follow up in 2 weeks for management of maternal medical comorbidities  -Patient to restart lovenox this pm  -Post amniocentesis instructions given  -see viewpoint report for procedure details  -please refer to other consult notes by Dr. Parker/Nelson for further management planning

## 2023-03-20 PROBLEM — I51.9 RIGHT VENTRICULAR DYSFUNCTION: Status: ACTIVE | Noted: 2023-03-20

## 2023-03-20 PROBLEM — I36.1 NONRHEUMATIC TRICUSPID VALVE REGURGITATION: Status: ACTIVE | Noted: 2023-03-20

## 2023-03-22 ENCOUNTER — PATIENT MESSAGE (OUTPATIENT)
Dept: OTHER | Facility: OTHER | Age: 33
End: 2023-03-22
Payer: MEDICAID

## 2023-03-22 ENCOUNTER — TELEPHONE (OUTPATIENT)
Dept: MATERNAL FETAL MEDICINE | Facility: CLINIC | Age: 33
End: 2023-03-22
Payer: MEDICAID

## 2023-03-23 ENCOUNTER — TELEPHONE (OUTPATIENT)
Dept: MATERNAL FETAL MEDICINE | Facility: CLINIC | Age: 33
End: 2023-03-23
Payer: MEDICAID

## 2023-03-23 LAB
GENETIC COUNSELING?: YES
GENSO SPECIMEN TYPE: NORMAL
MISCELLANEOUS GENETIC TEST NAME: NORMAL
PARTENTAL OR SIBLING TESTING?: NO
REFERENCE LAB: NORMAL
TEST RESULT: NORMAL

## 2023-03-23 NOTE — TELEPHONE ENCOUNTER
Fetal FISH and microarray resulted with normal findings. We are still waiting for fetal skeletal dysplasia panel results. Norma had some questions about what a negative result for this indicates. She also expressed frustration about not finding an answer yet. We discussed that we can expect the final results to report in another 1-2w. She is seeing [facility] on Monday. We will review additional results with her when they are available.     Elida Valentine

## 2023-04-03 ENCOUNTER — ROUTINE PRENATAL (OUTPATIENT)
Dept: OBSTETRICS AND GYNECOLOGY | Facility: CLINIC | Age: 33
End: 2023-04-03
Payer: MEDICAID

## 2023-04-03 ENCOUNTER — PROCEDURE VISIT (OUTPATIENT)
Dept: OBSTETRICS AND GYNECOLOGY | Facility: CLINIC | Age: 33
End: 2023-04-03
Payer: MEDICAID

## 2023-04-03 ENCOUNTER — LAB VISIT (OUTPATIENT)
Dept: LAB | Facility: HOSPITAL | Age: 33
End: 2023-04-03
Payer: MEDICAID

## 2023-04-03 VITALS
WEIGHT: 113.56 LBS | BODY MASS INDEX: 19.49 KG/M2 | SYSTOLIC BLOOD PRESSURE: 100 MMHG | DIASTOLIC BLOOD PRESSURE: 58 MMHG

## 2023-04-03 DIAGNOSIS — B18.2 CHRONIC HEPATITIS C WITHOUT HEPATIC COMA: ICD-10-CM

## 2023-04-03 DIAGNOSIS — F33.2 SEVERE EPISODE OF RECURRENT MAJOR DEPRESSIVE DISORDER, WITHOUT PSYCHOTIC FEATURES: ICD-10-CM

## 2023-04-03 DIAGNOSIS — Z86.718 PREVIOUS DEEP VEIN THROMBOSIS (DVT) AFFECTING PREGNANCY IN THIRD TRIMESTER: ICD-10-CM

## 2023-04-03 DIAGNOSIS — O09.899 HISTORY OF PRETERM DELIVERY, CURRENTLY PREGNANT: ICD-10-CM

## 2023-04-03 DIAGNOSIS — O09.893 PREVIOUS DEEP VEIN THROMBOSIS (DVT) AFFECTING PREGNANCY IN THIRD TRIMESTER: ICD-10-CM

## 2023-04-03 DIAGNOSIS — F19.11 HISTORY OF DRUG ABUSE IN REMISSION: ICD-10-CM

## 2023-04-03 DIAGNOSIS — O09.299 HISTORY OF INTRAUTERINE GROWTH RESTRICTION IN PRIOR PREGNANCY, CURRENTLY PREGNANT: ICD-10-CM

## 2023-04-03 DIAGNOSIS — O34.219 HISTORY OF CESAREAN DELIVERY, CURRENTLY PREGNANT: ICD-10-CM

## 2023-04-03 DIAGNOSIS — O36.1990 MATERNAL RED CELL ALLOIMMUNIZATION, ANTEPARTUM, SINGLE OR UNSPECIFIED FETUS: ICD-10-CM

## 2023-04-03 DIAGNOSIS — R76.0 ANTI-CARDIOLIPIN ANTIBODY POSITIVE: ICD-10-CM

## 2023-04-03 DIAGNOSIS — O36.5930 POOR FETAL GROWTH AFFECTING MANAGEMENT OF MOTHER IN THIRD TRIMESTER, SINGLE OR UNSPECIFIED FETUS: Primary | ICD-10-CM

## 2023-04-03 DIAGNOSIS — O36.5930 POOR FETAL GROWTH AFFECTING MANAGEMENT OF MOTHER IN THIRD TRIMESTER, SINGLE OR UNSPECIFIED FETUS: ICD-10-CM

## 2023-04-03 DIAGNOSIS — I36.1 NONRHEUMATIC TRICUSPID VALVE REGURGITATION: ICD-10-CM

## 2023-04-03 DIAGNOSIS — O28.3 ABNORMAL FETAL ULTRASOUND: ICD-10-CM

## 2023-04-03 DIAGNOSIS — Z86.79 HISTORY OF ENDOCARDITIS: ICD-10-CM

## 2023-04-03 PROBLEM — O99.330 TOBACCO USE IN PREGNANCY, ANTEPARTUM: Status: RESOLVED | Noted: 2022-12-06 | Resolved: 2023-04-03

## 2023-04-03 LAB
ABO + RH BLD: ABNORMAL
BASOPHILS # BLD AUTO: 0.06 K/UL (ref 0–0.2)
BASOPHILS NFR BLD: 0.7 % (ref 0–1.9)
BLD GP AB SCN CELLS X3 SERPL QL: ABNORMAL
DIFFERENTIAL METHOD: ABNORMAL
EOSINOPHIL # BLD AUTO: 0.3 K/UL (ref 0–0.5)
EOSINOPHIL NFR BLD: 3.1 % (ref 0–8)
ERYTHROCYTE [DISTWIDTH] IN BLOOD BY AUTOMATED COUNT: 14.2 % (ref 11.5–14.5)
GLUCOSE SERPL-MCNC: 97 MG/DL (ref 70–140)
HCT VFR BLD AUTO: 39.7 % (ref 37–48.5)
HGB BLD-MCNC: 12.9 G/DL (ref 12–16)
IMM GRANULOCYTES # BLD AUTO: 0.11 K/UL (ref 0–0.04)
IMM GRANULOCYTES NFR BLD AUTO: 1.4 % (ref 0–0.5)
LYMPHOCYTES # BLD AUTO: 2.4 K/UL (ref 1–4.8)
LYMPHOCYTES NFR BLD: 30 % (ref 18–48)
MCH RBC QN AUTO: 28.9 PG (ref 27–31)
MCHC RBC AUTO-ENTMCNC: 32.5 G/DL (ref 32–36)
MCV RBC AUTO: 89 FL (ref 82–98)
MONOCYTES # BLD AUTO: 0.6 K/UL (ref 0.3–1)
MONOCYTES NFR BLD: 6.9 % (ref 4–15)
NEUTROPHILS # BLD AUTO: 4.7 K/UL (ref 1.8–7.7)
NEUTROPHILS NFR BLD: 57.9 % (ref 38–73)
NRBC BLD-RTO: 0 /100 WBC
PLATELET # BLD AUTO: 286 K/UL (ref 150–450)
PMV BLD AUTO: 11.4 FL (ref 9.2–12.9)
RBC # BLD AUTO: 4.46 M/UL (ref 4–5.4)
SPECIMEN OUTDATE: ABNORMAL
WBC # BLD AUTO: 8.13 K/UL (ref 3.9–12.7)

## 2023-04-03 PROCEDURE — 82950 GLUCOSE TEST: CPT | Performed by: MIDWIFE

## 2023-04-03 PROCEDURE — 76816 US OB/GYN PROCEDURE (VIEWPOINT): ICD-10-PCS | Mod: 26,S$PBB,, | Performed by: OBSTETRICS & GYNECOLOGY

## 2023-04-03 PROCEDURE — 99213 OFFICE O/P EST LOW 20 MIN: CPT | Mod: PBBFAC,TH,25 | Performed by: OBSTETRICS & GYNECOLOGY

## 2023-04-03 PROCEDURE — 99213 PR OFFICE/OUTPT VISIT, EST, LEVL III, 20-29 MIN: ICD-10-PCS | Mod: TH,S$PBB,, | Performed by: OBSTETRICS & GYNECOLOGY

## 2023-04-03 PROCEDURE — 86870 RBC ANTIBODY IDENTIFICATION: CPT | Performed by: MIDWIFE

## 2023-04-03 PROCEDURE — 86592 SYPHILIS TEST NON-TREP QUAL: CPT | Performed by: MIDWIFE

## 2023-04-03 PROCEDURE — 76819 US OB/GYN PROCEDURE (VIEWPOINT): ICD-10-PCS | Mod: 26,S$PBB,, | Performed by: OBSTETRICS & GYNECOLOGY

## 2023-04-03 PROCEDURE — 76819 FETAL BIOPHYS PROFIL W/O NST: CPT | Mod: 26,S$PBB,, | Performed by: OBSTETRICS & GYNECOLOGY

## 2023-04-03 PROCEDURE — 86886 COOMBS TEST INDIRECT TITER: CPT | Mod: 91 | Performed by: MIDWIFE

## 2023-04-03 PROCEDURE — 76816 OB US FOLLOW-UP PER FETUS: CPT | Mod: PBBFAC | Performed by: OBSTETRICS & GYNECOLOGY

## 2023-04-03 PROCEDURE — 76820 UMBILICAL ARTERY ECHO: CPT | Mod: 26,S$PBB,, | Performed by: OBSTETRICS & GYNECOLOGY

## 2023-04-03 PROCEDURE — 99999 PR PBB SHADOW E&M-EST. PATIENT-LVL III: CPT | Mod: PBBFAC,,, | Performed by: OBSTETRICS & GYNECOLOGY

## 2023-04-03 PROCEDURE — 85025 COMPLETE CBC W/AUTO DIFF WBC: CPT | Performed by: MIDWIFE

## 2023-04-03 PROCEDURE — 87389 HIV-1 AG W/HIV-1&-2 AB AG IA: CPT | Performed by: MIDWIFE

## 2023-04-03 PROCEDURE — 99999 PR PBB SHADOW E&M-EST. PATIENT-LVL III: ICD-10-PCS | Mod: PBBFAC,,, | Performed by: OBSTETRICS & GYNECOLOGY

## 2023-04-03 PROCEDURE — 86900 BLOOD TYPING SEROLOGIC ABO: CPT | Performed by: MIDWIFE

## 2023-04-03 PROCEDURE — 76820 US OB/GYN PROCEDURE (VIEWPOINT): ICD-10-PCS | Mod: 26,S$PBB,, | Performed by: OBSTETRICS & GYNECOLOGY

## 2023-04-03 PROCEDURE — 99213 OFFICE O/P EST LOW 20 MIN: CPT | Mod: TH,S$PBB,, | Performed by: OBSTETRICS & GYNECOLOGY

## 2023-04-03 NOTE — PROGRESS NOTES
Here for routine OB visit:  1. Pregnancy:  -no acute issues; doing T3 labs today  -CDC and ACOG info on TDaP discussed; wants to wait until next visit to get it  2. Maternal red cell alloimmunization  -repeat antibody titers with today's labs  3.  Poor fetal growth  -amnio with negative FISH and negative microarray  -baby with micromelia; awaiting results of skeletal dysplasia panel  - start  testing now  4. History of endocarditis  - saw Dr. Nieves with cardiology  -needs abx prophylaxis for invasive procedures  -repeat echo next month  5. History of PTD  -pt not taking vaginal prometrium  -denies any labor symptoms  6. Habitual aborter  -APLAS testing inconclusive; repeat labs at the end of this month  -continue lovenox and aspirin  7. Hep C  -needs to establish with hepatitis clinic (referral has been placed)  8. Bipolar disorder  -stable on seroquel    RTC weekly for  surveillance; TDaP next visit

## 2023-04-04 LAB
BLD GP AB SCN TITR SERPL: 4 {TITER}
BLD GP AB SCN TITR SERPL: 4 {TITER}
BLOOD GROUP ANTIBODIES SERPL: NORMAL
HIV 1+2 AB+HIV1 P24 AG SERPL QL IA: NORMAL
RPR SER QL: NORMAL

## 2023-04-05 ENCOUNTER — PATIENT MESSAGE (OUTPATIENT)
Dept: OTHER | Facility: OTHER | Age: 33
End: 2023-04-05
Payer: MEDICAID

## 2023-04-12 ENCOUNTER — TELEPHONE (OUTPATIENT)
Dept: HEMATOLOGY/ONCOLOGY | Facility: CLINIC | Age: 33
End: 2023-04-12
Payer: MEDICAID

## 2023-04-17 ENCOUNTER — PATIENT MESSAGE (OUTPATIENT)
Dept: MATERNAL FETAL MEDICINE | Facility: CLINIC | Age: 33
End: 2023-04-17
Payer: MEDICAID

## 2023-04-17 ENCOUNTER — TELEPHONE (OUTPATIENT)
Dept: MATERNAL FETAL MEDICINE | Facility: CLINIC | Age: 33
End: 2023-04-17
Payer: MEDICAID

## 2023-04-17 DIAGNOSIS — Z36.89 ENCOUNTER FOR ULTRASOUND TO ASSESS FETAL GROWTH: Primary | ICD-10-CM

## 2023-04-19 ENCOUNTER — PROCEDURE VISIT (OUTPATIENT)
Dept: OBSTETRICS AND GYNECOLOGY | Facility: CLINIC | Age: 33
End: 2023-04-19
Payer: MEDICAID

## 2023-04-19 ENCOUNTER — ROUTINE PRENATAL (OUTPATIENT)
Dept: OBSTETRICS AND GYNECOLOGY | Facility: CLINIC | Age: 33
End: 2023-04-19
Payer: MEDICAID

## 2023-04-19 ENCOUNTER — PATIENT MESSAGE (OUTPATIENT)
Dept: OTHER | Facility: OTHER | Age: 33
End: 2023-04-19
Payer: MEDICAID

## 2023-04-19 VITALS
WEIGHT: 114.44 LBS | BODY MASS INDEX: 19.64 KG/M2 | DIASTOLIC BLOOD PRESSURE: 70 MMHG | SYSTOLIC BLOOD PRESSURE: 120 MMHG

## 2023-04-19 DIAGNOSIS — O22.43 HEMORRHOIDS DURING PREGNANCY IN THIRD TRIMESTER: ICD-10-CM

## 2023-04-19 DIAGNOSIS — O09.893 PREVIOUS DEEP VEIN THROMBOSIS (DVT) AFFECTING PREGNANCY IN THIRD TRIMESTER: ICD-10-CM

## 2023-04-19 DIAGNOSIS — Z86.718 PREVIOUS DEEP VEIN THROMBOSIS (DVT) AFFECTING PREGNANCY IN THIRD TRIMESTER: ICD-10-CM

## 2023-04-19 DIAGNOSIS — N96 HABITUAL ABORTER: ICD-10-CM

## 2023-04-19 DIAGNOSIS — O36.5930 POOR FETAL GROWTH AFFECTING MANAGEMENT OF MOTHER IN THIRD TRIMESTER, SINGLE OR UNSPECIFIED FETUS: Primary | ICD-10-CM

## 2023-04-19 DIAGNOSIS — B18.2 CHRONIC HEPATITIS C WITHOUT HEPATIC COMA: ICD-10-CM

## 2023-04-19 DIAGNOSIS — O36.5930 POOR FETAL GROWTH AFFECTING MANAGEMENT OF MOTHER IN THIRD TRIMESTER, SINGLE OR UNSPECIFIED FETUS: ICD-10-CM

## 2023-04-19 DIAGNOSIS — O26.13 LOW WEIGHT GAIN DURING PREGNANCY IN THIRD TRIMESTER: ICD-10-CM

## 2023-04-19 DIAGNOSIS — O36.1990 MATERNAL RED CELL ALLOIMMUNIZATION, ANTEPARTUM, SINGLE OR UNSPECIFIED FETUS: ICD-10-CM

## 2023-04-19 DIAGNOSIS — O34.219 HISTORY OF CESAREAN DELIVERY, CURRENTLY PREGNANT: ICD-10-CM

## 2023-04-19 DIAGNOSIS — Z3A.32 32 WEEKS GESTATION OF PREGNANCY: ICD-10-CM

## 2023-04-19 PROCEDURE — 76820 UMBILICAL ARTERY ECHO: CPT | Mod: PBBFAC | Performed by: OBSTETRICS & GYNECOLOGY

## 2023-04-19 PROCEDURE — 99212 OFFICE O/P EST SF 10 MIN: CPT | Mod: PBBFAC,TH | Performed by: MIDWIFE

## 2023-04-19 PROCEDURE — 76819 US OB/GYN PROCEDURE (VIEWPOINT): ICD-10-PCS | Mod: 26,S$PBB,, | Performed by: OBSTETRICS & GYNECOLOGY

## 2023-04-19 PROCEDURE — 99214 PR OFFICE/OUTPT VISIT, EST, LEVL IV, 30-39 MIN: ICD-10-PCS | Mod: TH,S$PBB,, | Performed by: MIDWIFE

## 2023-04-19 PROCEDURE — 99999 PR PBB SHADOW E&M-EST. PATIENT-LVL II: ICD-10-PCS | Mod: PBBFAC,,, | Performed by: MIDWIFE

## 2023-04-19 PROCEDURE — 99214 OFFICE O/P EST MOD 30 MIN: CPT | Mod: TH,S$PBB,, | Performed by: MIDWIFE

## 2023-04-19 PROCEDURE — 99999 PR PBB SHADOW E&M-EST. PATIENT-LVL II: CPT | Mod: PBBFAC,,, | Performed by: MIDWIFE

## 2023-04-19 PROCEDURE — 76820 US OB/GYN PROCEDURE (VIEWPOINT): ICD-10-PCS | Mod: 26,S$PBB,, | Performed by: OBSTETRICS & GYNECOLOGY

## 2023-04-19 PROCEDURE — 76819 FETAL BIOPHYS PROFIL W/O NST: CPT | Mod: 26,S$PBB,, | Performed by: OBSTETRICS & GYNECOLOGY

## 2023-04-19 RX ORDER — LACTOSE-REDUCED FOOD 0.06G-1/ML
1 LIQUID (ML) ORAL DAILY
Qty: 30 EACH | Refills: 3 | Status: SHIPPED | OUTPATIENT
Start: 2023-04-19 | End: 2023-05-19

## 2023-04-19 RX ORDER — HYDROCORTISONE 25 MG/G
CREAM TOPICAL 2 TIMES DAILY
Qty: 20 G | Refills: 1 | Status: SHIPPED | OUTPATIENT
Start: 2023-04-19 | End: 2023-05-02 | Stop reason: SDUPTHER

## 2023-04-19 NOTE — PROGRESS NOTES
32 y.o. female  at 32w0d   Reports + FM, denies VB, LOF or regular CTX  Doing ok, c/o hemorrhoids, rx sent for anusol, tux pads as well, rx for boost to see if insurance will cover  TW lbs   Hepatitis C virus infection without hepatic coma              -Needs to see hepatology, referral placed earlier in pregnancy  Previous deep vein thrombosis (DVT) affecting pregnancy in second trimester              -Seeing hem/onc              -Lovenox as ordered  History of  delivery, currently pregnant              -For repeat C/S, will follow MFM recommendation on time of delivery d/t FGR  Maternal red cell alloimmunization, antepartum              -Antibody titers   Poor fetal growth affecting management of mother in third trimester              -Seeing MFM               -US today: eric breech, MVP 3.3, SD ratios 93%  Abnormal fetal ultrasound              -Seeing MFM, Amnio done 3/14/23              -Short long bones              -Saw genetics 3/7/23              -Will follow up w/ pt once amnio results back  Bipolar 1 disorder              -Seroquel nightly  History of drug abuse in remission              -UDS negative  Recurrent major depressive disorder              -No meds currently  History of loop electrical excision procedure (LEEP)                    -Cervical length 35 mm 23  Habitual aborter              -SAB X7  Anti-cardiolipin antibody positive              -Repeat at end of April  Reviewed warning signs, normal FKCs, labor precautions and how/when to call.  RTC x  wks w/ MFM as scheduled, call or present sooner prn.

## 2023-04-21 ENCOUNTER — TELEPHONE (OUTPATIENT)
Dept: OBSTETRICS AND GYNECOLOGY | Facility: CLINIC | Age: 33
End: 2023-04-21

## 2023-04-21 ENCOUNTER — TELEPHONE (OUTPATIENT)
Dept: OBSTETRICS AND GYNECOLOGY | Facility: CLINIC | Age: 33
End: 2023-04-21
Payer: MEDICAID

## 2023-04-21 NOTE — TELEPHONE ENCOUNTER
----- Message from Margarette Amador sent at 4/21/2023  9:36 AM CDT -----  Contact: [phone]  Patient would like to consult with a nurse in regards to her medication. Please call to advise at [phone]. Thanks

## 2023-04-24 ENCOUNTER — TELEPHONE (OUTPATIENT)
Dept: MATERNAL FETAL MEDICINE | Facility: CLINIC | Age: 33
End: 2023-04-24
Payer: MEDICAID

## 2023-04-24 ENCOUNTER — PROCEDURE VISIT (OUTPATIENT)
Dept: OBSTETRICS AND GYNECOLOGY | Facility: CLINIC | Age: 33
End: 2023-04-24
Payer: MEDICAID

## 2023-04-24 VITALS
HEART RATE: 68 BPM | DIASTOLIC BLOOD PRESSURE: 70 MMHG | BODY MASS INDEX: 19.72 KG/M2 | WEIGHT: 115.5 LBS | SYSTOLIC BLOOD PRESSURE: 116 MMHG | HEIGHT: 64 IN

## 2023-04-24 DIAGNOSIS — O36.5990 FETAL GROWTH RESTRICTION ANTEPARTUM: Primary | ICD-10-CM

## 2023-04-24 DIAGNOSIS — O36.1990 MATERNAL RED CELL ALLOIMMUNIZATION, ANTEPARTUM, SINGLE OR UNSPECIFIED FETUS: ICD-10-CM

## 2023-04-24 DIAGNOSIS — Z86.718 PREVIOUS DEEP VEIN THROMBOSIS (DVT) AFFECTING PREGNANCY IN THIRD TRIMESTER: ICD-10-CM

## 2023-04-24 DIAGNOSIS — Z36.89 ENCOUNTER FOR ULTRASOUND TO ASSESS FETAL GROWTH: ICD-10-CM

## 2023-04-24 DIAGNOSIS — O99.330 TOBACCO USE IN PREGNANCY, ANTEPARTUM: ICD-10-CM

## 2023-04-24 DIAGNOSIS — Z86.79 HISTORY OF ENDOCARDITIS: ICD-10-CM

## 2023-04-24 DIAGNOSIS — O28.3 ABNORMAL FETAL ULTRASOUND: ICD-10-CM

## 2023-04-24 DIAGNOSIS — B18.2 CHRONIC HEPATITIS C WITHOUT HEPATIC COMA: ICD-10-CM

## 2023-04-24 DIAGNOSIS — N96 HABITUAL ABORTER: ICD-10-CM

## 2023-04-24 DIAGNOSIS — O09.893 PREVIOUS DEEP VEIN THROMBOSIS (DVT) AFFECTING PREGNANCY IN THIRD TRIMESTER: ICD-10-CM

## 2023-04-24 PROCEDURE — 76820 UMBILICAL ARTERY ECHO: CPT | Mod: PBBFAC,PO | Performed by: OBSTETRICS & GYNECOLOGY

## 2023-04-24 PROCEDURE — 76819 PR US, OB, FETAL BIOPHYSICAL, W/O NST: ICD-10-PCS | Mod: 26,S$PBB,, | Performed by: OBSTETRICS & GYNECOLOGY

## 2023-04-24 PROCEDURE — 76816 OB US FOLLOW-UP PER FETUS: CPT | Mod: 26,S$PBB,, | Performed by: OBSTETRICS & GYNECOLOGY

## 2023-04-24 PROCEDURE — 76820 PR US, OB DOPPLER, FETAL UMBILICAL ARTERY ECHO: ICD-10-PCS | Mod: 26,S$PBB,, | Performed by: OBSTETRICS & GYNECOLOGY

## 2023-04-24 PROCEDURE — 76819 FETAL BIOPHYS PROFIL W/O NST: CPT | Mod: 26,S$PBB,, | Performed by: OBSTETRICS & GYNECOLOGY

## 2023-04-24 PROCEDURE — 99215 PR OFFICE/OUTPT VISIT, EST, LEVL V, 40-54 MIN: ICD-10-PCS | Mod: S$PBB,TH,, | Performed by: OBSTETRICS & GYNECOLOGY

## 2023-04-24 PROCEDURE — 76816 OB US FOLLOW-UP PER FETUS: CPT | Mod: PBBFAC,PO | Performed by: OBSTETRICS & GYNECOLOGY

## 2023-04-24 PROCEDURE — 76820 UMBILICAL ARTERY ECHO: CPT | Mod: 26,S$PBB,, | Performed by: OBSTETRICS & GYNECOLOGY

## 2023-04-24 PROCEDURE — 99215 OFFICE O/P EST HI 40 MIN: CPT | Mod: S$PBB,TH,, | Performed by: OBSTETRICS & GYNECOLOGY

## 2023-04-24 PROCEDURE — 76819 FETAL BIOPHYS PROFIL W/O NST: CPT | Mod: PBBFAC,PO | Performed by: OBSTETRICS & GYNECOLOGY

## 2023-04-24 PROCEDURE — 76816 PR  US,PREGNANT UTERUS,F/U,TRANSABD APP: ICD-10-PCS | Mod: 26,S$PBB,, | Performed by: OBSTETRICS & GYNECOLOGY

## 2023-04-24 NOTE — PROGRESS NOTES
"Maternal Fetal Medicine follow up consult    SUBJECTIVE:     Norma De La Garza is a 32 y.o.  female with IUP at 32w5d who is seen in follow up consultation by M.  Pregnancy complications include:   Problem   Abnormal Fetal Ultrasound   Maternal Red Cell Alloimmunization, Antepartum   History of Endocarditis   Habitual Aborter   Previous Deep Vein Thrombosis (Dvt) Affecting Pregnancy in Third Trimester   Hepatitis C Virus Infection Without Hepatic Coma   Tobacco Use in Pregnancy, Antepartum (Resolved)       Previous notes reviewed.   No changes to medical, surgical, family, social, or obstetric history.    Interval history since last MFM visit: no major interval problems    Medications:  Current Outpatient Medications   Medication Instructions    aspirin (ECOTRIN) 81 mg, Oral, Daily    enoxaparin (LOVENOX) 40 mg, Subcutaneous, Daily    food supplemt, lactose-reduced (BOOST HIGH PROTEIN) 0.06 gram- 1 kcal/mL Liqd 1 each, Oral, Daily    hydrocortisone 2.5 % cream Topical (Top), 2 times daily    ondansetron (ZOFRAN-ODT) 4 mg, Oral, Every 12 hours PRN    prenatal 25/iron fum/folic/dha (PRENATAL-1 ORAL) Oral    promethazine (PHENERGAN) 25 mg, Oral, Every 4 hours PRN    QUEtiapine (SEROQUEL) 50 mg, Oral, Nightly       Care team members:  Dr. Mariee - Primary OB  Dr. Escobar - cardiology     OBJECTIVE:   /70   Pulse 68   Ht 5' 4" (1.626 m)   Wt 52.4 kg (115 lb 8.3 oz)   LMP 09/15/2022   BMI 19.83 kg/m²     Ultrasound performed. See viewpoint for full ultrasound report.  FGR is identified on today's study. The EFW plots at the 1%, and the AC plots at the <1%. The long bones continue to demonstrate significant growth lag of 5-6 SD below the average.  The EFW is 1106 g.  AFV is normal, and the BPP score is reassuring at 8/8.   Umbilical artery Doppler S/D ratios are elevated > 99th percentile but diastolic flow is noted.        ASSESSMENT/PLAN:     32 y.o.  female with IUP at 32w5d    Hepatitis " C virus infection without hepatic coma  Viral load: 5.38 log in January  LFTS normal in January    See prior notes for more extensive counseling  Recommendations:  Repeat screen for HIV infection, hepatitis A and hepatitis B infection, and other sexually transmitted diseases (syphilis, gonorrhea, and chlamydia) in 3rd trimester  Vaccinate for hepatitis A and hepatitis B if non-immune  Given normal LFTs and fact that LFTs decrease in pregnancy, no need for further repeat of LFTs.   Refer to hepatology for evaluation and consideration of treatment postpartum  Mode of delivery per other obstetric indications.  Avoid invasive fetal procedures intrapartum (early amniotomy, fetal scalp electrode, and episiotomy) unless necessary.  Breastfeeding is not contraindicated (recommend abstain from breastfeeding if nipples are bleeding or cracked).  Evaluation of  by pediatricians after delivery.    Abnormal fetal ultrasound  Please see prior notes for extensive counseling.  Ultrasound findings have been consistent with early onset FGR with micromelia with approximately 4-5 week lag in biometry for long bones. TORCH titers were consistent with a likely prior history of CMV (IgG positive, IgM neg). Fetal echo was normal. Amniocentesis normal CMA; skeletal dysplasia panel negative for causative variants; an single copy VOUS in ECV seen.     Ultrasound today demonstrates continued lag of the long bones with femur and humerus at -5-6 SD. Normal mineralization and no evidence of fracture or curvature.    Recommendations:  1. Will plan to repeat ultrasound in 3 weeks for growth.   2. Given overall clinical scenario, will likely recommend delivery at 36-37 weeks.  At this point, none of the findings point to a need for  surgical intervention, so unless other indications develop would plan for delivery in [place].    Habitual aborter  Anticardiolipin + IgM in January--needs repeat testing.   Cont lovenox,  aspirin.  Recommend repeat anticardiolipin lab draw > 12 weeks from initial draw, but given her history and presentation would continue the Lovenox and aspirin through this pregnancy.    History of endocarditis  Please see prior notes for counseling.  Maternal echo 2/2023:  The left ventricle is normal in size with concentric remodeling and normal systolic function.  The estimated ejection fraction is 60%.  Normal left ventricular diastolic function.  Normal right ventricular size with mildly to moderately reduced right ventricular systolic function.  There is moderate prolapse of the anterior tricuspid leaflet.  Moderate to severe tricuspid regurgitation.  Normal central venous pressure (3 mmHg).  The estimated PA systolic pressure is 27 mmHg.  Mild mitral regurgitation.    Saw Dr. Nieves who recommended  -abx prophylaxis for invasive procedures including delivery  -repeat echo in 3 months  Denies any cardiac symptoms or complaints currently.      Maternal red cell alloimmunization, antepartum  Anti-FyA and Anti-E--last titers 1:4    At amnio red cell antigen genotypic performed and fetus is negative for FyA and E so not at risk for hemolytic disease. No further titers indicated.    Previous deep vein thrombosis (DVT) affecting pregnancy in third trimester  No symptoms. Has been compliant with LMWH  Please refer to initial consult for full details  Please obtain prothrombin [id number] mutation and Factor V Leiden mutation analysis  Continue lovenox 40 mg daily and aspirin 81 mg daily.    Peripartum Management  MAUDE hose/SCDs should be used while anticoagulation is interrupted.  Regardless of whether the patient is on prophylactic dose UFH or LMWH, the last dose should be 12 hours prior to neuraxial anesthesia. For this reason, we no longer recommend conversion to UFH at 36 weeks.   In patients with neuraxial anesthesia: prophylactic anticoagulation should not be initiated within 12 hours of neuraxial blockade or within  4 hours of epidural removal, whichever is later; therapeutic anticoagulation with LMWH should not be initiated within 24 hours of neuraxial blockade or within 4 hours of epidural removal, whichever is later.     Prophylactic anticoagulation (defer to timing related to neuraxial anesthesia)  After vaginal delivery: should be started no sooner than 6 hours   After  delivery: should be started no sooner than 12 hours      Tobacco use in pregnancy, antepartum  Smoking 1 ppd. Encourage attempts at cessation    FETAL CHECKLIST  Primary MFM: Bernard Orona  Genetic Counseling: done  Genetic testing: CMA normal, skeletal panel no mutation identified  Delivery timin-37  Delivery location: [place]  Mode of delivery:  per routine OB indications but may need  depending on fetal status      F/u in 3 weeks for MFM visit and US      45 minutes of total time spent on the encounter, which includes face to face time and non-face to face time preparing to see the patient (eg, review of tests), obtaining and/or reviewing separately obtained history, documenting clinical information in the electronic or other health record, independently interpreting results (not separately reported) and communicating results to the patient/family/caregiver, or care coordination (not separately reported).    Bernard Orona  Maternal-Fetal Medicine    Electronically Signed by Bernard Orona 2023

## 2023-04-24 NOTE — ASSESSMENT & PLAN NOTE
Please see prior notes for extensive counseling.  Ultrasound findings have been consistent with early onset FGR with micromelia with approximately 4-5 week lag in biometry for long bones. TORCH titers were consistent with a likely prior history of CMV (IgG positive, IgM neg). Fetal echo was normal. Amniocentesis normal CMA; skeletal dysplasia panel negative for causative variants; an single copy VOUS in ECV seen.     Ultrasound today demonstrates continued lag of the long bones with femur and humerus at -5-6 SD. Normal mineralization and no evidence of fracture or curvature.    Recommendations:  1. Will plan to repeat ultrasound in 3 weeks for growth.   2. Given overall clinical scenario, will likely recommend delivery at 36-37 weeks.  At this point, none of the findings point to a need for  surgical intervention, so unless other indications develop would plan for delivery in [place].

## 2023-04-24 NOTE — TELEPHONE ENCOUNTER
Called Norma to discuss her results from amniocentesis. She was at work and requested a call back on Monday 4/24 in the AM.    Elida Valentine CGC

## 2023-04-24 NOTE — ASSESSMENT & PLAN NOTE
Anti-FyA and Anti-E--last titers 1:4    At amnio red cell antigen genotypic performed and fetus is negative for FyA and E so not at risk for hemolytic disease. No further titers indicated.

## 2023-04-24 NOTE — ASSESSMENT & PLAN NOTE
Anticardiolipin + IgM in January--needs repeat testing.   Cont lovenox, aspirin.  Recommend repeat anticardiolipin lab draw > 12 weeks from initial draw, but given her history and presentation would continue the Lovenox and aspirin through this pregnancy.

## 2023-04-24 NOTE — ASSESSMENT & PLAN NOTE
Please see prior notes for counseling.  Maternal echo 2/2023:   The left ventricle is normal in size with concentric remodeling and normal systolic function.   The estimated ejection fraction is 60%.   Normal left ventricular diastolic function.   Normal right ventricular size with mildly to moderately reduced right ventricular systolic function.   There is moderate prolapse of the anterior tricuspid leaflet.   Moderate to severe tricuspid regurgitation.   Normal central venous pressure (3 mmHg).   The estimated PA systolic pressure is 27 mmHg.   Mild mitral regurgitation.    Saw Dr. Nieves who recommended  -abx prophylaxis for invasive procedures including delivery  -repeat echo in 3 months  Denies any cardiac symptoms or complaints currently.

## 2023-04-24 NOTE — ASSESSMENT & PLAN NOTE
No symptoms. Has been compliant with LMWH  Please refer to initial consult for full details  Please obtain prothrombin [id number] mutation and Factor V Leiden mutation analysis  Continue lovenox 40 mg daily and aspirin 81 mg daily.    Peripartum Management  MAUDE hose/SCDs should be used while anticoagulation is interrupted.  Regardless of whether the patient is on prophylactic dose UFH or LMWH, the last dose should be 12 hours prior to neuraxial anesthesia. For this reason, we no longer recommend conversion to UFH at 36 weeks.   In patients with neuraxial anesthesia: prophylactic anticoagulation should not be initiated within 12 hours of neuraxial blockade or within 4 hours of epidural removal, whichever is later; therapeutic anticoagulation with LMWH should not be initiated within 24 hours of neuraxial blockade or within 4 hours of epidural removal, whichever is later.     Prophylactic anticoagulation (defer to timing related to neuraxial anesthesia)  After vaginal delivery: should be started no sooner than 6 hours   After  delivery: should be started no sooner than 12 hours

## 2023-04-24 NOTE — ASSESSMENT & PLAN NOTE
Viral load: 5.38 log in January  LFTS normal in January    See prior notes for more extensive counseling  Recommendations:   Repeat screen for HIV infection, hepatitis A and hepatitis B infection, and other sexually transmitted diseases (syphilis, gonorrhea, and chlamydia) in 3rd trimester   Vaccinate for hepatitis A and hepatitis B if non-immune   Given normal LFTs and fact that LFTs decrease in pregnancy, no need for further repeat of LFTs.    Refer to hepatology for evaluation and consideration of treatment postpartum   Mode of delivery per other obstetric indications.   Avoid invasive fetal procedures intrapartum (early amniotomy, fetal scalp electrode, and episiotomy) unless necessary.   Breastfeeding is not contraindicated (recommend abstain from breastfeeding if nipples are bleeding or cracked).   Evaluation of  by pediatricians after delivery.

## 2023-04-25 ENCOUNTER — PATIENT MESSAGE (OUTPATIENT)
Dept: MATERNAL FETAL MEDICINE | Facility: CLINIC | Age: 33
End: 2023-04-25
Payer: MEDICAID

## 2023-04-25 DIAGNOSIS — Z36.89 ENCOUNTER FOR ULTRASOUND TO ASSESS FETAL GROWTH: Primary | ICD-10-CM

## 2023-04-28 ENCOUNTER — TELEPHONE (OUTPATIENT)
Dept: MATERNAL FETAL MEDICINE | Facility: CLINIC | Age: 33
End: 2023-04-28
Payer: MEDICAID

## 2023-04-28 NOTE — TELEPHONE ENCOUNTER
Call to patient and left date and time of her follow up appointment with Maternal Fetal Medicine at the [facility].

## 2023-05-02 ENCOUNTER — ROUTINE PRENATAL (OUTPATIENT)
Dept: OBSTETRICS AND GYNECOLOGY | Facility: CLINIC | Age: 33
End: 2023-05-02
Payer: MEDICAID

## 2023-05-02 ENCOUNTER — PROCEDURE VISIT (OUTPATIENT)
Dept: OBSTETRICS AND GYNECOLOGY | Facility: CLINIC | Age: 33
End: 2023-05-02
Payer: MEDICAID

## 2023-05-02 VITALS — WEIGHT: 115.5 LBS | BODY MASS INDEX: 19.83 KG/M2 | SYSTOLIC BLOOD PRESSURE: 120 MMHG | DIASTOLIC BLOOD PRESSURE: 70 MMHG

## 2023-05-02 DIAGNOSIS — O34.219 HISTORY OF CESAREAN DELIVERY, CURRENTLY PREGNANT: ICD-10-CM

## 2023-05-02 DIAGNOSIS — O09.893 PREVIOUS DEEP VEIN THROMBOSIS (DVT) AFFECTING PREGNANCY IN THIRD TRIMESTER: ICD-10-CM

## 2023-05-02 DIAGNOSIS — O36.5930 POOR FETAL GROWTH AFFECTING MANAGEMENT OF MOTHER IN THIRD TRIMESTER, SINGLE OR UNSPECIFIED FETUS: Primary | ICD-10-CM

## 2023-05-02 DIAGNOSIS — Z86.718 PREVIOUS DEEP VEIN THROMBOSIS (DVT) AFFECTING PREGNANCY IN THIRD TRIMESTER: ICD-10-CM

## 2023-05-02 DIAGNOSIS — O09.899 HISTORY OF PRETERM DELIVERY, CURRENTLY PREGNANT: ICD-10-CM

## 2023-05-02 DIAGNOSIS — O36.5930 POOR FETAL GROWTH AFFECTING MANAGEMENT OF MOTHER IN THIRD TRIMESTER, SINGLE OR UNSPECIFIED FETUS: ICD-10-CM

## 2023-05-02 DIAGNOSIS — O34.219 HISTORY OF CESAREAN DELIVERY, CURRENTLY PREGNANT: Primary | ICD-10-CM

## 2023-05-02 DIAGNOSIS — O22.43 HEMORRHOIDS DURING PREGNANCY IN THIRD TRIMESTER: ICD-10-CM

## 2023-05-02 PROCEDURE — 76820 US OB/GYN PROCEDURE (VIEWPOINT): ICD-10-PCS | Mod: 26,S$PBB,, | Performed by: OBSTETRICS & GYNECOLOGY

## 2023-05-02 PROCEDURE — 99999 PR PBB SHADOW E&M-EST. PATIENT-LVL III: CPT | Mod: PBBFAC,,, | Performed by: ADVANCED PRACTICE MIDWIFE

## 2023-05-02 PROCEDURE — 99214 OFFICE O/P EST MOD 30 MIN: CPT | Mod: TH,S$PBB,, | Performed by: ADVANCED PRACTICE MIDWIFE

## 2023-05-02 PROCEDURE — 99213 OFFICE O/P EST LOW 20 MIN: CPT | Mod: PBBFAC,TH,25 | Performed by: ADVANCED PRACTICE MIDWIFE

## 2023-05-02 PROCEDURE — 99999 PR PBB SHADOW E&M-EST. PATIENT-LVL III: ICD-10-PCS | Mod: PBBFAC,,, | Performed by: ADVANCED PRACTICE MIDWIFE

## 2023-05-02 PROCEDURE — 76820 UMBILICAL ARTERY ECHO: CPT | Mod: PBBFAC | Performed by: OBSTETRICS & GYNECOLOGY

## 2023-05-02 PROCEDURE — 76819 US OB/GYN PROCEDURE (VIEWPOINT): ICD-10-PCS | Mod: 26,S$PBB,, | Performed by: OBSTETRICS & GYNECOLOGY

## 2023-05-02 PROCEDURE — 76819 FETAL BIOPHYS PROFIL W/O NST: CPT | Mod: 26,S$PBB,, | Performed by: OBSTETRICS & GYNECOLOGY

## 2023-05-02 PROCEDURE — 99214 PR OFFICE/OUTPT VISIT, EST, LEVL IV, 30-39 MIN: ICD-10-PCS | Mod: TH,S$PBB,, | Performed by: ADVANCED PRACTICE MIDWIFE

## 2023-05-02 RX ORDER — HYDROCORTISONE 25 MG/G
CREAM TOPICAL 2 TIMES DAILY
Qty: 20 G | Refills: 1 | Status: SHIPPED | OUTPATIENT
Start: 2023-05-02 | End: 2023-12-05

## 2023-05-02 NOTE — PROGRESS NOTES
US today shows BPP 8/8, eric breech presentation, posterior placenta, 3VC, MVP 5.2, SD ratio 94%.

## 2023-05-02 NOTE — PROGRESS NOTES
32 y.o. female  at 33w6d   Reports + FM, denies VB, LOF or regular CTX  Doing well without concerns. Having some hemorrhoid pain. Discussed common discomforts. Encouraged tucks pads and hydrocortisone cream use.     TW lbs   GBS handout provided and reviewed    Poor fetal growth affecting management of mother in third trimester, single or unspecified fetus    Previous deep vein thrombosis (DVT) affecting pregnancy in third trimester  -taking lovenox daily as prescribed  History of  delivery, currently pregnant    History of  delivery, currently pregnant  -message sent to surgery scheduler to schedule repeat  in 36th week per Dr. Martínez last note.        Reviewed warning signs, normal FKCs, labor precautions and how/when to call.  RTC x 2 wks, call or present sooner prn.

## 2023-05-05 ENCOUNTER — PROCEDURE VISIT (OUTPATIENT)
Dept: OBSTETRICS AND GYNECOLOGY | Facility: CLINIC | Age: 33
End: 2023-05-05
Payer: MEDICAID

## 2023-05-05 ENCOUNTER — ROUTINE PRENATAL (OUTPATIENT)
Dept: OBSTETRICS AND GYNECOLOGY | Facility: CLINIC | Age: 33
End: 2023-05-05
Payer: MEDICAID

## 2023-05-05 VITALS
WEIGHT: 117.94 LBS | SYSTOLIC BLOOD PRESSURE: 112 MMHG | BODY MASS INDEX: 20.25 KG/M2 | DIASTOLIC BLOOD PRESSURE: 60 MMHG

## 2023-05-05 DIAGNOSIS — O09.893 PREVIOUS DEEP VEIN THROMBOSIS (DVT) AFFECTING PREGNANCY IN THIRD TRIMESTER: ICD-10-CM

## 2023-05-05 DIAGNOSIS — Z3A.34 34 WEEKS GESTATION OF PREGNANCY: ICD-10-CM

## 2023-05-05 DIAGNOSIS — O36.5930 POOR FETAL GROWTH AFFECTING MANAGEMENT OF MOTHER IN THIRD TRIMESTER, SINGLE OR UNSPECIFIED FETUS: Primary | ICD-10-CM

## 2023-05-05 DIAGNOSIS — Z30.09 UNWANTED FERTILITY: ICD-10-CM

## 2023-05-05 DIAGNOSIS — O34.219 HISTORY OF CESAREAN DELIVERY, CURRENTLY PREGNANT: ICD-10-CM

## 2023-05-05 DIAGNOSIS — B18.2 CHRONIC HEPATITIS C WITHOUT HEPATIC COMA: ICD-10-CM

## 2023-05-05 DIAGNOSIS — Z86.718 PREVIOUS DEEP VEIN THROMBOSIS (DVT) AFFECTING PREGNANCY IN THIRD TRIMESTER: ICD-10-CM

## 2023-05-05 DIAGNOSIS — O36.5930 POOR FETAL GROWTH AFFECTING MANAGEMENT OF MOTHER IN THIRD TRIMESTER, SINGLE OR UNSPECIFIED FETUS: ICD-10-CM

## 2023-05-05 PROCEDURE — 99214 OFFICE O/P EST MOD 30 MIN: CPT | Mod: TH,S$PBB,, | Performed by: MIDWIFE

## 2023-05-05 PROCEDURE — 99999 PR PBB SHADOW E&M-EST. PATIENT-LVL II: ICD-10-PCS | Mod: PBBFAC,,, | Performed by: MIDWIFE

## 2023-05-05 PROCEDURE — 76819 FETAL BIOPHYS PROFIL W/O NST: CPT | Mod: PBBFAC | Performed by: OBSTETRICS & GYNECOLOGY

## 2023-05-05 PROCEDURE — 99999 PR PBB SHADOW E&M-EST. PATIENT-LVL II: CPT | Mod: PBBFAC,,, | Performed by: MIDWIFE

## 2023-05-05 PROCEDURE — 99214 PR OFFICE/OUTPT VISIT, EST, LEVL IV, 30-39 MIN: ICD-10-PCS | Mod: TH,S$PBB,, | Performed by: MIDWIFE

## 2023-05-05 PROCEDURE — 99212 OFFICE O/P EST SF 10 MIN: CPT | Mod: PBBFAC,TH,25 | Performed by: MIDWIFE

## 2023-05-05 PROCEDURE — 76819 US OB/GYN PROCEDURE (VIEWPOINT): ICD-10-PCS | Mod: 26,S$PBB,, | Performed by: OBSTETRICS & GYNECOLOGY

## 2023-05-05 NOTE — PROGRESS NOTES
32 y.o. female  at 34w2d   Reports + FM, denies VB, LOF or regular CTX  Doing ok, c/o hemorroids, rec made  TW lbs   Hepatitis C virus infection without hepatic coma              -Needs to see hepatology, referral placed earlier in pregnancy  Previous deep vein thrombosis (DVT) affecting pregnancy in third trimester              -Seeing hem/onc              -Lovenox as ordered  History of  delivery, currently pregnant              -For repeat C/S, scheduled   Maternal red cell alloimmunization, antepartum              -Antibody titers, no risk for fetal anemia  Poor fetal growth affecting management of mother in third trimester              -Seeing MFM               -US today: BREECH, BPP 8/, MVP 3.5  Abnormal fetal ultrasound              -Seeing MFM, Amnio done 3/14/23              -Short long bones              -Saw genetics 3/7/23              -amnio normal for skeletal dysplasia  Bipolar 1 disorder              -Seroquel nightly  History of drug abuse in remission              -UDS negative  Recurrent major depressive disorder              -No meds currently  History of loop electrical excision procedure (LEEP)                    -Cervical length 35 mm 23  Habitual aborter              -SAB X7  Reviewed warning signs, normal FKCs, labor precautions and how/when to call.  RTC x 4 days, call or present sooner prn.

## 2023-05-09 ENCOUNTER — PROCEDURE VISIT (OUTPATIENT)
Dept: OBSTETRICS AND GYNECOLOGY | Facility: CLINIC | Age: 33
End: 2023-05-09
Payer: MEDICAID

## 2023-05-09 ENCOUNTER — HOSPITAL ENCOUNTER (OUTPATIENT)
Facility: HOSPITAL | Age: 33
LOS: 1 days | Discharge: HOME | End: 2023-05-09
Attending: OBSTETRICS & GYNECOLOGY
Payer: MEDICAID

## 2023-05-09 ENCOUNTER — ROUTINE PRENATAL (OUTPATIENT)
Dept: OBSTETRICS AND GYNECOLOGY | Facility: CLINIC | Age: 33
End: 2023-05-09
Payer: MEDICAID

## 2023-05-09 VITALS
DIASTOLIC BLOOD PRESSURE: 60 MMHG | BODY MASS INDEX: 19.79 KG/M2 | WEIGHT: 115.31 LBS | SYSTOLIC BLOOD PRESSURE: 110 MMHG

## 2023-05-09 VITALS
DIASTOLIC BLOOD PRESSURE: 74 MMHG | RESPIRATION RATE: 18 BRPM | OXYGEN SATURATION: 97 % | SYSTOLIC BLOOD PRESSURE: 132 MMHG | TEMPERATURE: 99 F | HEART RATE: 86 BPM

## 2023-05-09 DIAGNOSIS — O36.5930 POOR FETAL GROWTH AFFECTING MANAGEMENT OF MOTHER IN THIRD TRIMESTER, SINGLE OR UNSPECIFIED FETUS: ICD-10-CM

## 2023-05-09 DIAGNOSIS — F33.2 SEVERE EPISODE OF RECURRENT MAJOR DEPRESSIVE DISORDER, WITHOUT PSYCHOTIC FEATURES: ICD-10-CM

## 2023-05-09 DIAGNOSIS — O36.5930 POOR FETAL GROWTH AFFECTING MANAGEMENT OF MOTHER IN THIRD TRIMESTER, SINGLE OR UNSPECIFIED FETUS: Primary | ICD-10-CM

## 2023-05-09 DIAGNOSIS — O36.1990 MATERNAL RED CELL ALLOIMMUNIZATION, ANTEPARTUM, SINGLE OR UNSPECIFIED FETUS: ICD-10-CM

## 2023-05-09 DIAGNOSIS — B18.2 CHRONIC HEPATITIS C WITHOUT HEPATIC COMA: ICD-10-CM

## 2023-05-09 DIAGNOSIS — I36.1 NONRHEUMATIC TRICUSPID VALVE REGURGITATION: ICD-10-CM

## 2023-05-09 DIAGNOSIS — Z86.718 PREVIOUS DEEP VEIN THROMBOSIS (DVT) AFFECTING PREGNANCY IN THIRD TRIMESTER: ICD-10-CM

## 2023-05-09 DIAGNOSIS — O34.219 HISTORY OF CESAREAN DELIVERY, CURRENTLY PREGNANT: ICD-10-CM

## 2023-05-09 DIAGNOSIS — O09.899 HISTORY OF PRETERM DELIVERY, CURRENTLY PREGNANT: ICD-10-CM

## 2023-05-09 DIAGNOSIS — O09.893 PREVIOUS DEEP VEIN THROMBOSIS (DVT) AFFECTING PREGNANCY IN THIRD TRIMESTER: ICD-10-CM

## 2023-05-09 DIAGNOSIS — R76.0 ANTI-CARDIOLIPIN ANTIBODY POSITIVE: ICD-10-CM

## 2023-05-09 DIAGNOSIS — F31.9 BIPOLAR 1 DISORDER: ICD-10-CM

## 2023-05-09 DIAGNOSIS — O36.5930 POOR FETAL GROWTH AFFECTING MANAGEMENT OF MOTHER IN THIRD TRIMESTER: ICD-10-CM

## 2023-05-09 PROBLEM — A59.01 TRICHOMONAL VAGINITIS DURING PREGNANCY IN FIRST TRIMESTER: Status: RESOLVED | Noted: 2022-12-08 | Resolved: 2023-05-09

## 2023-05-09 PROBLEM — O23.591 TRICHOMONAL VAGINITIS DURING PREGNANCY IN FIRST TRIMESTER: Status: RESOLVED | Noted: 2022-12-08 | Resolved: 2023-05-09

## 2023-05-09 PROCEDURE — 59025 FETAL NON-STRESS TEST: CPT | Mod: 26,,,

## 2023-05-09 PROCEDURE — 76819 FETAL BIOPHYS PROFIL W/O NST: CPT | Mod: PBBFAC | Performed by: OBSTETRICS & GYNECOLOGY

## 2023-05-09 PROCEDURE — 99213 OFFICE O/P EST LOW 20 MIN: CPT | Mod: PBBFAC,TH | Performed by: OBSTETRICS & GYNECOLOGY

## 2023-05-09 PROCEDURE — 99213 PR OFFICE/OUTPT VISIT, EST, LEVL III, 20-29 MIN: ICD-10-PCS | Mod: 25,TH,S$PBB, | Performed by: OBSTETRICS & GYNECOLOGY

## 2023-05-09 PROCEDURE — 59025 FETAL NON-STRESS TEST: CPT

## 2023-05-09 PROCEDURE — 59025 OBTAIN FETAL NONSTRESS TEST (NST): ICD-10-PCS | Mod: 26,,,

## 2023-05-09 PROCEDURE — 99499 NO LOS: ICD-10-PCS | Mod: TH,,,

## 2023-05-09 PROCEDURE — 99211 OFF/OP EST MAY X REQ PHY/QHP: CPT | Mod: 27

## 2023-05-09 PROCEDURE — 99999 PR PBB SHADOW E&M-EST. PATIENT-LVL III: CPT | Mod: PBBFAC,,, | Performed by: OBSTETRICS & GYNECOLOGY

## 2023-05-09 PROCEDURE — 99999 PR PBB SHADOW E&M-EST. PATIENT-LVL III: ICD-10-PCS | Mod: PBBFAC,,, | Performed by: OBSTETRICS & GYNECOLOGY

## 2023-05-09 PROCEDURE — 99499 UNLISTED E&M SERVICE: CPT | Mod: TH,,,

## 2023-05-09 PROCEDURE — 99213 OFFICE O/P EST LOW 20 MIN: CPT | Mod: 25,TH,S$PBB, | Performed by: OBSTETRICS & GYNECOLOGY

## 2023-05-09 PROCEDURE — 76819 US OB/GYN PROCEDURE (VIEWPOINT): ICD-10-PCS | Mod: 26,S$PBB,, | Performed by: OBSTETRICS & GYNECOLOGY

## 2023-05-09 RX ORDER — SODIUM CHLORIDE, SODIUM LACTATE, POTASSIUM CHLORIDE, CALCIUM CHLORIDE 600; 310; 30; 20 MG/100ML; MG/100ML; MG/100ML; MG/100ML
INJECTION, SOLUTION INTRAVENOUS CONTINUOUS
Status: CANCELLED | OUTPATIENT
Start: 2023-05-09

## 2023-05-09 RX ORDER — ACETAMINOPHEN 500 MG
500 TABLET ORAL EVERY 6 HOURS PRN
Status: DISCONTINUED | OUTPATIENT
Start: 2023-05-09 | End: 2023-05-09 | Stop reason: HOSPADM

## 2023-05-09 RX ORDER — SODIUM CHLORIDE, SODIUM LACTATE, POTASSIUM CHLORIDE, CALCIUM CHLORIDE 600; 310; 30; 20 MG/100ML; MG/100ML; MG/100ML; MG/100ML
INJECTION, SOLUTION INTRAVENOUS CONTINUOUS
Status: DISCONTINUED | OUTPATIENT
Start: 2023-05-09 | End: 2023-05-09 | Stop reason: HOSPADM

## 2023-05-09 RX ORDER — OXYTOCIN/RINGER'S LACTATE 30/500 ML
95 PLASTIC BAG, INJECTION (ML) INTRAVENOUS ONCE
Status: CANCELLED | OUTPATIENT
Start: 2023-05-09 | End: 2023-05-09

## 2023-05-09 RX ORDER — SODIUM CITRATE AND CITRIC ACID MONOHYDRATE 334; 500 MG/5ML; MG/5ML
30 SOLUTION ORAL
Status: CANCELLED | OUTPATIENT
Start: 2023-05-09

## 2023-05-09 RX ORDER — METHYLERGONOVINE MALEATE 0.2 MG/ML
200 INJECTION INTRAVENOUS
Status: CANCELLED | OUTPATIENT
Start: 2023-05-09

## 2023-05-09 RX ORDER — CARBOPROST TROMETHAMINE 250 UG/ML
250 INJECTION, SOLUTION INTRAMUSCULAR
Status: CANCELLED | OUTPATIENT
Start: 2023-05-09

## 2023-05-09 RX ORDER — ONDANSETRON 8 MG/1
8 TABLET, ORALLY DISINTEGRATING ORAL EVERY 8 HOURS PRN
Status: DISCONTINUED | OUTPATIENT
Start: 2023-05-09 | End: 2023-05-09 | Stop reason: HOSPADM

## 2023-05-09 RX ORDER — PROCHLORPERAZINE EDISYLATE 5 MG/ML
5 INJECTION INTRAMUSCULAR; INTRAVENOUS EVERY 6 HOURS PRN
Status: DISCONTINUED | OUTPATIENT
Start: 2023-05-09 | End: 2023-05-09 | Stop reason: HOSPADM

## 2023-05-09 RX ORDER — MUPIROCIN 20 MG/G
OINTMENT TOPICAL
Status: CANCELLED | OUTPATIENT
Start: 2023-05-09

## 2023-05-09 RX ORDER — MISOPROSTOL 100 UG/1
800 TABLET ORAL
Status: CANCELLED | OUTPATIENT
Start: 2023-05-09

## 2023-05-09 RX ORDER — OXYTOCIN/RINGER'S LACTATE 30/500 ML
334 PLASTIC BAG, INJECTION (ML) INTRAVENOUS ONCE
Status: CANCELLED | OUTPATIENT
Start: 2023-05-09 | End: 2023-05-09

## 2023-05-09 RX ORDER — FAMOTIDINE 10 MG/ML
20 INJECTION INTRAVENOUS
Status: CANCELLED | OUTPATIENT
Start: 2023-05-09

## 2023-05-09 NOTE — PROGRESS NOTES
Here for routine OB visit:  1. Pregnancy:  -no acute issues today; no labor symptoms  -The skin of the suprapubic region was evaluated and appears healthy.  Counseled the patient to shower daily and to wash this area with an antibacterial soap such as Dial daily.  Advised her to not shave the hair from this area from now until after delivery.  I also counseled the patient to place antibacterial hand soap in all her bathrooms and kitchen at home to help facilitate proper hand hygiene practices before and after delivery.  -consent for repeat  reviewed and signed; pt declines BTL; hibiclens packets with instructions given    2. IUGR, micromelia, elevated UA S/D ratios  -BPP today  (-2 for movement)  -advised to go to L&D for NST; spoke with L&D charge nurse; if abnormal, recommend BMZ series; otherwise give BMZ series next week in anticipation of delivery at 36wga    3. Hx of DVT; + APLAS  -on lovenox ppx  -advised to hold lovenox 24 hours prior to planned delivery    4. Maternal red cell alloimmunization  -Anti-FyA and Anti-E Ab's present  -fetus negative for the antigens; no further titers needed  -will review case with Dr. Saldaña with transfusion medicine in case pt needs any blood products surrounding delivery;  CBC thus far has been normal    5. Bipolar 1 disorder  -stable on seroquel    6.  Nonrheumatic tricuspid valve regurgitation and hx of endocarditis  -give routine Ancef prophylaxis prior to ; no other abx indicated    If NST okay, pt has f/u in the office Friday for OB visit with BPP and UA Dopplers  **recommend BMZ series 5/15 and  in anticipation of delivery on **

## 2023-05-09 NOTE — DISCHARGE INSTRUCTIONS
Discharge Instructions    Self Care Instructions:    Diet:  Eat from the five basic food groups  Fruits and proteins are good choices  Limit fast foods and added salt/sugar  Moderate carbonated and caffeine drinks    Hydration:  Drink at least 8 large glasses of water a day    Kick Counts:  After a meal, rest on your side and note the baby's movements until you have 8-10 movements in a 2 hour counting period.    If you do not feel your baby move 8-10 times within 2 hours or you sense a change in the type or character of the baby's movement, you should come in to the hospital at once.  Remember; your baby can sleep for 20-40 minutes at a time.      When to notify your provider:    Vaginal bleeding like a period;  You may spot if we examined your cervix.  If your water breaks, come to the birth center.  Note time, color and odor.  Abdominal tenderness or pain that does not go away  Contractions every 3 to 5 minutes for 1 to 2 hours.  True contractions move from front to back, are regular; usually get longer, stronger and closer together and do not stop if you change your position or activity.  Any burning, urgency or frequency in relation to emptying your bladder.  Any temperature greater than 100.4 degrees, chills, flu-like symptoms       Patient will need to be further evaluated if requesting prednisone  The last time it was given for sacroiilitis and it was for a short term  She can be placed on same day or anything availabilities next week with first available provider   Thanks much

## 2023-05-09 NOTE — PROCEDURES
Norma De La Garza is a 32 y.o. female patient.    Temp: 98.8 °F (37.1 °C) (05/09/23 1700)  Pulse: 86 (05/09/23 1705)  Resp: 18 (05/09/23 1700)  BP: 132/74 (05/09/23 1705)  SpO2: 97 % (05/09/23 1705)       Obtain Fetal nonstress test (NST)    Date/Time: 5/9/2023 6:00 PM  Performed by: Lisy Frances CNM  Authorized by: Lisy Frances CNM     Nonstress Test:     Variability:  6-25 BPM    Decelerations:  None    Accelerations:  15 bpm    Acoustic Stimulator: No      Baseline:  140    Uterine Irritability: No      Contractions:  Not present  Biophysical Profile:     Nonstress Test Interpretation: reactive      Overall Impression:  Reassuring  Post-procedure:     Patient tolerance:  Patient tolerated the procedure well with no immediate complications    5/9/2023

## 2023-05-09 NOTE — Clinical Note
Hi Dr. Saldaña, This is a pregnant patient of ours scheduled for a  on .  She has Anti-FyA and Anti-E antibodies.  Great news is that the fetus does not have these specific antigens.  Her baseline Hb/Hct look fine, and she's at average risk for hemorrhage at the time of delivery, but my concern is the availability of emergency blood without these antigens.  Should we type and match her a few days prior to delivery?

## 2023-05-09 NOTE — DISCHARGE SUMMARY
[facility] - Labor & Delivery  Obstetrics  Discharge Summary      Patient Name: Norma De La Garza  MRN: [id number]  Admission Date: 2023  Hospital Length of Stay: 1 days  Discharge Date and Time:  2023 6:03 PM  Attending Physician: Cinthia Brumfield MD   Discharging Provider: Lisy Frances CNM   Primary Care Provider: Ron Mckinnon MD    HPI: 32 y.o.  34w6d sent from office for BPP 6/8 (off for movement)        FHT: 140Cat 1 (reassuring)  TOCO: none    Procedure(s) (LRB):   SECTION (N/A)     Hospital Course:   NST    23 @ 1800:  NST reactive   Discussed future appts and POC for BMZ and delivery. Patient verbalized understanding   Discharge instructions reviewed. Return to LD if experiencing decreased fetal movement, leaking of fluid, contractions, and/or vaginal bleeding. Patient verbalized understanding   Keep next scheduled  appt              Final Active Diagnoses:    Diagnosis Date Noted POA    PRINCIPAL PROBLEM:  Poor fetal growth affecting management of mother in third trimester [O36.5930] 2023 Yes      Problems Resolved During this Admission:        Significant Diagnostic Studies: Labs: All labs within the past 24 hours have been reviewed        Immunizations     None          This patient has no babies on file.  Pending Diagnostic Studies:     None          Discharged Condition: good    Disposition: Home or Self Care    Follow Up:   Follow-up Information     Nellie Mariee MD Follow up on 2023.    Specialties: Obstetrics and Gynecology, Obstetrics  Contact information:  [address] [place] [address] DR Deena PEREZ [address] [phone]                       Patient Instructions:      Notify your health care provider if you experience any of the following:  temperature >100.4     Notify your health care provider if you experience any of the following:  persistent nausea and vomiting or diarrhea     Notify your health care provider if you experience any of the following:  severe  uncontrolled pain     Notify your health care provider if you experience any of the following:  difficulty breathing or increased cough     Notify your health care provider if you experience any of the following:  severe persistent headache     Notify your health care provider if you experience any of the following:  persistent dizziness, light-headedness, or visual disturbances     Notify your health care provider if you experience any of the following:  increased confusion or weakness     Notify your health care provider if you experience any of the following:   Order Comments: Return to LD if experiencing decreased fetal movement, leaking of fluid, contractions, and/or vaginal bleeding     Activity as tolerated     Medications:  Current Discharge Medication List      CONTINUE these medications which have NOT CHANGED    Details   aspirin (ECOTRIN) 81 MG EC tablet Take 1 tablet (81 mg total) by mouth once daily.  Qty: 90 tablet, Refills: 3    Associated Diagnoses: Supervision of high risk pregnancy in second trimester      enoxaparin (LOVENOX) 40 mg/0.4 mL Syrg Inject 0.4 mLs (40 mg total) into the skin once daily.  Qty: 12 mL, Refills: 6    Associated Diagnoses: Acute deep vein thrombosis (DVT) of proximal vein of right lower extremity      food supplemt, lactose-reduced (BOOST HIGH PROTEIN) 0.06 gram- 1 kcal/mL Liqd Take 1 each by mouth once daily.  Qty: 30 each, Refills: 3    Associated Diagnoses: Low weight gain during pregnancy in third trimester      hydrocortisone 2.5 % cream Apply topically 2 (two) times daily.  Qty: 20 g, Refills: 1    Associated Diagnoses: Hemorrhoids during pregnancy in third trimester      ondansetron (ZOFRAN-ODT) 4 MG TbDL Take 1 tablet (4 mg total) by mouth every 12 (twelve) hours as needed (nausea).  Qty: 30 tablet, Refills: 3    Associated Diagnoses: Nausea and vomiting in pregnancy      prenatal 25/iron fum/folic/dha (PRENATAL-1 ORAL) Take by mouth.      promethazine (PHENERGAN) 25  MG tablet Take 1 tablet (25 mg total) by mouth every 4 (four) hours as needed for Nausea.  Qty: 30 tablet, Refills: 3    Associated Diagnoses: Nausea and vomiting in pregnancy      QUEtiapine (SEROQUEL) 50 MG tablet Take 1 tablet (50 mg total) by mouth nightly.  Qty: 30 tablet, Refills: 11    Associated Diagnoses: Anxiety             Lisy Frances CNM  Obstetrics  [facility] - Labor & Delivery

## 2023-05-09 NOTE — HOSPITAL COURSE
MO    5/9/23 @ 1800:  NST reactive   Discussed future appts and POC for BMZ and delivery. Patient verbalized understanding   Discharge instructions reviewed. Return to LD if experiencing decreased fetal movement, leaking of fluid, contractions, and/or vaginal bleeding. Patient verbalized understanding   Keep next scheduled  appt

## 2023-05-09 NOTE — SUBJECTIVE & OBJECTIVE
Obstetric HPI:  Patient reports None contractions, active fetal movement, No vaginal bleeding , No loss of fluid     This pregnancy has been complicated by see problem list    OB History    Para Term  AB Living   10 2 0 2 7 3   SAB IAB Ectopic Multiple Live Births   7 0 0 1 3      # Outcome Date GA Lbr Huseyin/2nd Weight Sex Delivery Anes PTL Lv   10 Current            9 2022           8 2022           7 SAB 21 5w0d          6  16 32w0d   F CS-Unspec EPI  ELTON   5 2012           4 2011 3w0d          3 SAB 06/15/10 17w0d   F  EPI  FD      Complications: Rh incompatibility   2A  09 32w0d   F CS-Unspec OTHER, EPI  ELTON   2B  09 32w0d   M CS-Unspec EPI N ELTON   1               Past Medical History:   Diagnosis Date    Abnormal Pap smear of cervix     Deep vein thrombosis     Encounter for blood transfusion     Hepatitis B carrier     Hepatitis C      Past Surgical History:   Procedure Laterality Date     SECTION      CONIZATION OF CERVIX USING LOOP ELECTROSURGICAL EXCISION PROCEDURE (LEEP) N/A 2022    Procedure: LEEP CONIZATION, CERVIX;  Surgeon: DESTINY Mariee MD;  Location: [facility];  Service: OB/GYN;  Laterality: N/A;    DILATION AND CURETTAGE OF UTERUS      jaw reconstruction      TONSILLECTOMY         PTA Medications   Medication Sig    aspirin (ECOTRIN) 81 MG EC tablet Take 1 tablet (81 mg total) by mouth once daily.    enoxaparin (LOVENOX) 40 mg/0.4 mL Syrg Inject 0.4 mLs (40 mg total) into the skin once daily.    food supplemt, lactose-reduced (BOOST HIGH PROTEIN) 0.06 gram- 1 kcal/mL Liqd Take 1 each by mouth once daily.    hydrocortisone 2.5 % cream Apply topically 2 (two) times daily.    ondansetron (ZOFRAN-ODT) 4 MG TbDL Take 1 tablet (4 mg total) by mouth every 12 (twelve) hours as needed (nausea).    prenatal 25/iron fum/folic/dha (PRENATAL-1 ORAL) Take by mouth.    promethazine (PHENERGAN) 25 MG tablet Take 1 tablet  (25 mg total) by mouth every 4 (four) hours as needed for Nausea.    QUEtiapine (SEROQUEL) 50 MG tablet Take 1 tablet (50 mg total) by mouth nightly.       Review of patient's allergies indicates:   Allergen Reactions    Penicillins     Meperidine Itching        Family History    None       Tobacco Use    Smoking status: Every Day     Packs/day: 1.00     Types: Cigarettes    Smokeless tobacco: Current   Substance and Sexual Activity    Alcohol use: No    Drug use: Not Currently     Comment: Heroine    Sexual activity: Yes     Partners: Male     Birth control/protection: None     Review of Systems   All other systems reviewed and are negative.   Objective:     Vital Signs (Most Recent):  Pulse: 86 (05/09/23 1705)  BP: 132/74 (05/09/23 1705)  SpO2: 97 % (05/09/23 1705) Vital Signs (24h Range):  Pulse:  [83-86] 86  SpO2:  [97 %-98 %] 97 %  BP: (110-132)/(60-74) 132/74        There is no height or weight on file to calculate BMI.    FHT: 140Cat 1 (reassuring)  TOCO:  none     Physical Exam:   Constitutional: She is oriented to person, place, and time. She appears well-developed and well-nourished.       Cardiovascular:  Normal rate.             Pulmonary/Chest: Effort normal. No respiratory distress.        Abdominal: Soft.     Genitourinary:    Uterus normal.             Musculoskeletal: Moves all extremeties.       Neurological: She is alert and oriented to person, place, and time.    Skin: Skin is warm and dry.    Psychiatric: She has a normal mood and affect.      Cervix: deferred       Significant Labs:  Lab Results   Component Value Date    GROUPTRH A POS 04/03/2023    HEPBSAG Non-reactive 12/05/2022       I have personallly reviewed all pertinent lab results from the last 24 hours.

## 2023-05-09 NOTE — H&P
[facility] - Labor & Delivery  Obstetrics  History & Physical    Patient Name: Norma De La Garza  MRN: [id number]  Admission Date: 2023  Primary Care Provider: Ron Mckinnon MD    Subjective:     Principal Problem:Poor fetal growth affecting management of mother in third trimester    History of Present Illness:  32 y.o.  34w6d sent from office for BPP  (off for movement)        Obstetric HPI:  Patient reports None contractions, active fetal movement, No vaginal bleeding , No loss of fluid     This pregnancy has been complicated by see problem list    OB History    Para Term  AB Living   10 2 0 2 7 3   SAB IAB Ectopic Multiple Live Births   7 0 0 1 3      # Outcome Date GA Lbr Huseyin/2nd Weight Sex Delivery Anes PTL Lv   10 Current            9 2022           8 2022           7 SAB 21 5w0d          6  16 32w0d   F CS-Unspec EPI  ELTON   5 2012           4 2011 3w0d          3 SAB 06/15/10 17w0d   F  EPI  FD      Complications: Rh incompatibility   2A  09 32w0d   F CS-Unspec OTHER, EPI  ELTON   2B  09 32w0d   M CS-Unspec EPI N ELTON   1 SAB              Past Medical History:   Diagnosis Date    Abnormal Pap smear of cervix     Deep vein thrombosis     Encounter for blood transfusion     Hepatitis B carrier     Hepatitis C      Past Surgical History:   Procedure Laterality Date     SECTION      CONIZATION OF CERVIX USING LOOP ELECTROSURGICAL EXCISION PROCEDURE (LEEP) N/A 2022    Procedure: LEEP CONIZATION, CERVIX;  Surgeon: DESTINY Mariee MD;  Location: [facility];  Service: OB/GYN;  Laterality: N/A;    DILATION AND CURETTAGE OF UTERUS      jaw reconstruction  2008    TONSILLECTOMY         PTA Medications   Medication Sig    aspirin (ECOTRIN) 81 MG EC tablet Take 1 tablet (81 mg total) by mouth once daily.    enoxaparin (LOVENOX) 40 mg/0.4 mL Syrg Inject 0.4 mLs (40 mg total) into the skin once daily.    food  supplemt, lactose-reduced (BOOST HIGH PROTEIN) 0.06 gram- 1 kcal/mL Liqd Take 1 each by mouth once daily.    hydrocortisone 2.5 % cream Apply topically 2 (two) times daily.    ondansetron (ZOFRAN-ODT) 4 MG TbDL Take 1 tablet (4 mg total) by mouth every 12 (twelve) hours as needed (nausea).    prenatal 25/iron fum/folic/dha (PRENATAL-1 ORAL) Take by mouth.    promethazine (PHENERGAN) 25 MG tablet Take 1 tablet (25 mg total) by mouth every 4 (four) hours as needed for Nausea.    QUEtiapine (SEROQUEL) 50 MG tablet Take 1 tablet (50 mg total) by mouth nightly.       Review of patient's allergies indicates:   Allergen Reactions    Penicillins     Meperidine Itching        Family History    None       Tobacco Use    Smoking status: Every Day     Packs/day: 1.00     Types: Cigarettes    Smokeless tobacco: Current   Substance and Sexual Activity    Alcohol use: No    Drug use: Not Currently     Comment: Heroine    Sexual activity: Yes     Partners: Male     Birth control/protection: None     Review of Systems   All other systems reviewed and are negative.   Objective:     Vital Signs (Most Recent):  Pulse: 86 (05/09/23 1705)  BP: 132/74 (05/09/23 1705)  SpO2: 97 % (05/09/23 1705) Vital Signs (24h Range):  Pulse:  [83-86] 86  SpO2:  [97 %-98 %] 97 %  BP: (110-132)/(60-74) 132/74        There is no height or weight on file to calculate BMI.    FHT: 140Cat 1 (reassuring)  TOCO:  none     Physical Exam:   Constitutional: She is oriented to person, place, and time. She appears well-developed and well-nourished.       Cardiovascular:  Normal rate.             Pulmonary/Chest: Effort normal. No respiratory distress.        Abdominal: Soft.     Genitourinary:    Uterus normal.             Musculoskeletal: Moves all extremeties.       Neurological: She is alert and oriented to person, place, and time.    Skin: Skin is warm and dry.    Psychiatric: She has a normal mood and affect.      Cervix: deferred       Significant  Labs:  Lab Results   Component Value Date    GROUPTRH A POS 2023    HEPBSAG Non-reactive 2022       I have personallly reviewed all pertinent lab results from the last 24 hours.    Assessment/Plan:     32 y.o. female  at 34w6d for:    * Poor fetal growth affecting management of mother in third trimester  NST        Lisy Frances, ANAND  Obstetrics  [facility] - [facility]

## 2023-05-10 ENCOUNTER — PATIENT MESSAGE (OUTPATIENT)
Dept: OTHER | Facility: OTHER | Age: 33
End: 2023-05-10
Payer: MEDICAID

## 2023-05-12 ENCOUNTER — PATIENT MESSAGE (OUTPATIENT)
Dept: MATERNAL FETAL MEDICINE | Facility: CLINIC | Age: 33
End: 2023-05-12
Payer: MEDICAID

## 2023-05-12 ENCOUNTER — HOSPITAL ENCOUNTER (OUTPATIENT)
Facility: HOSPITAL | Age: 33
LOS: 1 days | Discharge: HOME | End: 2023-05-12
Attending: OBSTETRICS & GYNECOLOGY | Admitting: OBSTETRICS & GYNECOLOGY
Payer: MEDICAID

## 2023-05-12 ENCOUNTER — PROCEDURE VISIT (OUTPATIENT)
Dept: OBSTETRICS AND GYNECOLOGY | Facility: CLINIC | Age: 33
End: 2023-05-12
Payer: MEDICAID

## 2023-05-12 ENCOUNTER — ROUTINE PRENATAL (OUTPATIENT)
Dept: OBSTETRICS AND GYNECOLOGY | Facility: CLINIC | Age: 33
End: 2023-05-12
Payer: MEDICAID

## 2023-05-12 VITALS
DIASTOLIC BLOOD PRESSURE: 73 MMHG | RESPIRATION RATE: 18 BRPM | OXYGEN SATURATION: 96 % | SYSTOLIC BLOOD PRESSURE: 114 MMHG | HEART RATE: 64 BPM

## 2023-05-12 VITALS
DIASTOLIC BLOOD PRESSURE: 64 MMHG | WEIGHT: 116.63 LBS | BODY MASS INDEX: 20.02 KG/M2 | SYSTOLIC BLOOD PRESSURE: 104 MMHG

## 2023-05-12 DIAGNOSIS — O36.5930 POOR FETAL GROWTH AFFECTING MANAGEMENT OF MOTHER IN THIRD TRIMESTER: Primary | ICD-10-CM

## 2023-05-12 DIAGNOSIS — O09.93 HIGH-RISK PREGNANCY IN THIRD TRIMESTER: ICD-10-CM

## 2023-05-12 DIAGNOSIS — O34.219 HISTORY OF CESAREAN DELIVERY, CURRENTLY PREGNANT: ICD-10-CM

## 2023-05-12 DIAGNOSIS — O36.5930 POOR FETAL GROWTH AFFECTING MANAGEMENT OF MOTHER IN THIRD TRIMESTER, SINGLE OR UNSPECIFIED FETUS: Primary | ICD-10-CM

## 2023-05-12 DIAGNOSIS — O36.5930 POOR FETAL GROWTH AFFECTING MANAGEMENT OF MOTHER IN THIRD TRIMESTER, SINGLE OR UNSPECIFIED FETUS: ICD-10-CM

## 2023-05-12 PROCEDURE — 1111F PR DISCHARGE MEDS RECONCILED W/ CURRENT OUTPATIENT MED LIST: ICD-10-PCS | Mod: CPTII,,, | Performed by: MIDWIFE

## 2023-05-12 PROCEDURE — 99213 OFFICE O/P EST LOW 20 MIN: CPT | Mod: TH,25,, | Performed by: ADVANCED PRACTICE MIDWIFE

## 2023-05-12 PROCEDURE — 99212 OFFICE O/P EST SF 10 MIN: CPT | Mod: PBBFAC,TH,25 | Performed by: MIDWIFE

## 2023-05-12 PROCEDURE — 99213 PR OFFICE/OUTPT VISIT, EST, LEVL III, 20-29 MIN: ICD-10-PCS | Mod: TH,25,, | Performed by: ADVANCED PRACTICE MIDWIFE

## 2023-05-12 PROCEDURE — 1111F DSCHRG MED/CURRENT MED MERGE: CPT | Mod: CPTII,,, | Performed by: MIDWIFE

## 2023-05-12 PROCEDURE — 99499 NO LOS: ICD-10-PCS | Mod: TH,S$PBB,, | Performed by: MIDWIFE

## 2023-05-12 PROCEDURE — 99211 OFF/OP EST MAY X REQ PHY/QHP: CPT | Mod: 25,27

## 2023-05-12 PROCEDURE — 76819 US OB/GYN PROCEDURE (VIEWPOINT): ICD-10-PCS | Mod: 26,59,S$PBB, | Performed by: OBSTETRICS & GYNECOLOGY

## 2023-05-12 PROCEDURE — 99499 UNLISTED E&M SERVICE: CPT | Mod: TH,S$PBB,, | Performed by: MIDWIFE

## 2023-05-12 PROCEDURE — 76819 FETAL BIOPHYS PROFIL W/O NST: CPT | Mod: 26,59,S$PBB, | Performed by: OBSTETRICS & GYNECOLOGY

## 2023-05-12 PROCEDURE — 99999 PR PBB SHADOW E&M-EST. PATIENT-LVL II: CPT | Mod: PBBFAC,,, | Performed by: MIDWIFE

## 2023-05-12 PROCEDURE — 59025 FETAL NON-STRESS TEST: CPT | Mod: 59

## 2023-05-12 PROCEDURE — 99999 PR PBB SHADOW E&M-EST. PATIENT-LVL II: ICD-10-PCS | Mod: PBBFAC,,, | Performed by: MIDWIFE

## 2023-05-12 PROCEDURE — 76820 US OB/GYN PROCEDURE (VIEWPOINT): ICD-10-PCS | Mod: 26,S$PBB,, | Performed by: OBSTETRICS & GYNECOLOGY

## 2023-05-12 PROCEDURE — 59025 FETAL NON-STRESS TEST: CPT | Mod: 26,,, | Performed by: ADVANCED PRACTICE MIDWIFE

## 2023-05-12 PROCEDURE — 59025 OBTAIN FETAL NONSTRESS TEST (NST): ICD-10-PCS | Mod: 26,,, | Performed by: ADVANCED PRACTICE MIDWIFE

## 2023-05-12 PROCEDURE — 76820 UMBILICAL ARTERY ECHO: CPT | Mod: PBBFAC | Performed by: OBSTETRICS & GYNECOLOGY

## 2023-05-12 NOTE — H&P
[facility] - Labor & Delivery  Obstetrics  History & Physical    Patient Name: Norma De La Garza  MRN: [id number]  Admission Date: 2023  Primary Care Provider: Ron Mckinnon MD    Subjective:     Principal Problem:Poor fetal growth affecting management of mother in third trimester    History of Present Illness:  Sent from office due to BPP 6 of 8, 2 off for breathing      Obstetric HPI:  Patient reports no contractions, active fetal movement, No vaginal bleeding , No loss of fluid     This pregnancy has been complicated by high risk pregnancy, IUGR, Hep C, Hx drug abuse    OB History    Para Term  AB Living   10 2 0 2 7 3   SAB IAB Ectopic Multiple Live Births   7 0 0 1 3      # Outcome Date GA Lbr Huseyin/2nd Weight Sex Delivery Anes PTL Lv   10 Current            9 2022           8 2022           7 SAB 21 5w0d          6  16 32w0d   F CS-Unspec EPI  ELTON   5 2012           4 2011 3w0d          3 SAB 06/15/10 17w0d   F  EPI  FD      Complications: Rh incompatibility   2A  09 32w0d   F CS-Unspec OTHER, EPI  ELTON   2B  09 32w0d   M CS-Unspec EPI N ELTON   1 SAB              Past Medical History:   Diagnosis Date    Abnormal Pap smear of cervix     Deep vein thrombosis     Encounter for blood transfusion     Hepatitis B carrier     Hepatitis C      Past Surgical History:   Procedure Laterality Date     SECTION      CONIZATION OF CERVIX USING LOOP ELECTROSURGICAL EXCISION PROCEDURE (LEEP) N/A 2022    Procedure: LEEP CONIZATION, CERVIX;  Surgeon: DESTINY Mariee MD;  Location: [facility];  Service: OB/GYN;  Laterality: N/A;    DILATION AND CURETTAGE OF UTERUS      jaw reconstruction  2008    TONSILLECTOMY         PTA Medications   Medication Sig    aspirin (ECOTRIN) 81 MG EC tablet Take 1 tablet (81 mg total) by mouth once daily.    enoxaparin (LOVENOX) 40 mg/0.4 mL Syrg Inject 0.4 mLs (40 mg total) into the skin once daily.     food supplemt, lactose-reduced (BOOST HIGH PROTEIN) 0.06 gram- 1 kcal/mL Liqd Take 1 each by mouth once daily.    hydrocortisone 2.5 % cream Apply topically 2 (two) times daily.    ondansetron (ZOFRAN-ODT) 4 MG TbDL Take 1 tablet (4 mg total) by mouth every 12 (twelve) hours as needed (nausea).    prenatal 25/iron fum/folic/dha (PRENATAL-1 ORAL) Take by mouth.    promethazine (PHENERGAN) 25 MG tablet Take 1 tablet (25 mg total) by mouth every 4 (four) hours as needed for Nausea.    QUEtiapine (SEROQUEL) 50 MG tablet Take 1 tablet (50 mg total) by mouth nightly.       Review of patient's allergies indicates:   Allergen Reactions    Penicillins     Meperidine Itching        Family History    None       Tobacco Use    Smoking status: Every Day     Packs/day: 1.00     Types: Cigarettes    Smokeless tobacco: Current   Substance and Sexual Activity    Alcohol use: No    Drug use: Not Currently     Comment: Heroine    Sexual activity: Yes     Partners: Male     Birth control/protection: None     Review of Systems   Constitutional:         Voices no complaints at present and appears in NAD   All other systems reviewed and are negative.   Objective:     Vital Signs (Most Recent):  Pulse: 81 (05/12/23 1421)  Resp: 18 (05/12/23 1320)  BP: 114/73 (05/12/23 1335)  SpO2: 97 % (05/12/23 1421) Vital Signs (24h Range):  Pulse:  [62-81] 81  Resp:  [18] 18  SpO2:  [90 %-97 %] 97 %  BP: (104-117)/(64-77) 114/73        There is no height or weight on file to calculate BMI.    FHT: Cat 1 (reassuring)  TOCO:  None     Physical Exam:   Constitutional: She is oriented to person, place, and time. She appears well-developed and well-nourished.        Pulmonary/Chest: Effort normal.        Abdominal: Soft.     Genitourinary:    Uterus normal.             Musculoskeletal: Normal range of motion and moves all extremeties.       Neurological: She is alert and oriented to person, place, and time.    Skin: Skin is warm and dry.     Psychiatric: She has a normal mood and affect. Her behavior is normal. Judgment and thought content normal.      Cervix:  deferred     Significant Labs:  Lab Results   Component Value Date    GROUPTRH A POS 2023    HEPBSAG Non-reactive 2022       I have personallly reviewed all pertinent lab results from the last 24 hours.    Assessment/Plan:     32 y.o. female  at 35w2d for:    * Poor fetal growth affecting management of mother in third trimester  NST reactive and FHT strip reassuring  BPP 6 of 8, 2 off for breathing        Marita Mejia CNM  Obstetrics  [facility] - [facility] & Delivery

## 2023-05-12 NOTE — SUBJECTIVE & OBJECTIVE
Obstetric HPI:  Patient reports no contractions, active fetal movement, No vaginal bleeding , No loss of fluid     This pregnancy has been complicated by high risk pregnancy, IUGR, Hep C, Hx drug abuse    OB History    Para Term  AB Living   10 2 0 2 7 3   SAB IAB Ectopic Multiple Live Births   7 0 0 1 3      # Outcome Date GA Lbr Huseyin/2nd Weight Sex Delivery Anes PTL Lv   10 Current            9 2022           8 2022           7 SAB 21 5w0d          6  16 32w0d   F CS-Unspec EPI  ELTON   5 2012           4 2011 3w0d          3 SAB 06/15/10 17w0d   F  EPI  FD      Complications: Rh incompatibility   2A  09 32w0d   F CS-Unspec OTHER, EPI  ELTON   2B  09 32w0d   M CS-Unspec EPI N ELTON   1 SAB              Past Medical History:   Diagnosis Date    Abnormal Pap smear of cervix     Deep vein thrombosis     Encounter for blood transfusion     Hepatitis B carrier     Hepatitis C      Past Surgical History:   Procedure Laterality Date     SECTION      CONIZATION OF CERVIX USING LOOP ELECTROSURGICAL EXCISION PROCEDURE (LEEP) N/A 2022    Procedure: LEEP CONIZATION, CERVIX;  Surgeon: DESTINY Mariee MD;  Location: [facility];  Service: OB/GYN;  Laterality: N/A;    DILATION AND CURETTAGE OF UTERUS      jaw reconstruction      TONSILLECTOMY         PTA Medications   Medication Sig    aspirin (ECOTRIN) 81 MG EC tablet Take 1 tablet (81 mg total) by mouth once daily.    enoxaparin (LOVENOX) 40 mg/0.4 mL Syrg Inject 0.4 mLs (40 mg total) into the skin once daily.    food supplemt, lactose-reduced (BOOST HIGH PROTEIN) 0.06 gram- 1 kcal/mL Liqd Take 1 each by mouth once daily.    hydrocortisone 2.5 % cream Apply topically 2 (two) times daily.    ondansetron (ZOFRAN-ODT) 4 MG TbDL Take 1 tablet (4 mg total) by mouth every 12 (twelve) hours as needed (nausea).    prenatal 25/iron fum/folic/dha (PRENATAL-1 ORAL) Take by mouth.    promethazine  (PHENERGAN) 25 MG tablet Take 1 tablet (25 mg total) by mouth every 4 (four) hours as needed for Nausea.    QUEtiapine (SEROQUEL) 50 MG tablet Take 1 tablet (50 mg total) by mouth nightly.       Review of patient's allergies indicates:   Allergen Reactions    Penicillins     Meperidine Itching        Family History    None       Tobacco Use    Smoking status: Every Day     Packs/day: 1.00     Types: Cigarettes    Smokeless tobacco: Current   Substance and Sexual Activity    Alcohol use: No    Drug use: Not Currently     Comment: Heroine    Sexual activity: Yes     Partners: Male     Birth control/protection: None     Review of Systems   Constitutional:         Voices no complaints at present and appears in NAD   All other systems reviewed and are negative.   Objective:     Vital Signs (Most Recent):  Pulse: 81 (05/12/23 1421)  Resp: 18 (05/12/23 1320)  BP: 114/73 (05/12/23 1335)  SpO2: 97 % (05/12/23 1421) Vital Signs (24h Range):  Pulse:  [62-81] 81  Resp:  [18] 18  SpO2:  [90 %-97 %] 97 %  BP: (104-117)/(64-77) 114/73        There is no height or weight on file to calculate BMI.    FHT: Cat 1 (reassuring)  TOCO:  None     Physical Exam:   Constitutional: She is oriented to person, place, and time. She appears well-developed and well-nourished.        Pulmonary/Chest: Effort normal.        Abdominal: Soft.     Genitourinary:    Uterus normal.             Musculoskeletal: Normal range of motion and moves all extremeties.       Neurological: She is alert and oriented to person, place, and time.    Skin: Skin is warm and dry.    Psychiatric: She has a normal mood and affect. Her behavior is normal. Judgment and thought content normal.      Cervix:  deferred     Significant Labs:  Lab Results   Component Value Date    GROUPTRH A POS 04/03/2023    HEPBSAG Non-reactive 12/05/2022       I have personallly reviewed all pertinent lab results from the last 24 hours.

## 2023-05-12 NOTE — DISCHARGE SUMMARY
[facility] - Labor & Delivery  Obstetrics  Discharge Summary      Patient Name: Norma De La Garza  MRN: [id number]  Admission Date: 2023  Hospital Length of Stay: 1 days  Discharge Date and Time:  2023 2:59 PM  Attending Physician: Jamel Arredondo MD   Discharging Provider: Marita Mejia CNM   Primary Care Provider: Ron Mckinnon MD    HPI: Sent from office due to BPP 6 of 8, 2 off for breathing      FHT: Cat 1 (reassuring)  TOCO: none    Procedure(s) (LRB):   SECTION (N/A)     Hospital Course:   EFM / NST reactive reassuring.  Consult with Dr Huggins R/T BPP 8 of 8, dopplers >99% with diastolic flow noted and reactive NST and FHT tracing for 2 hours.  OK to D/C to home with information on [facility]'s BID.  Has F/U appt with MFM and ultrasound on Monday advised to keep.            Final Active Diagnoses:    Diagnosis Date Noted POA    PRINCIPAL PROBLEM:  Poor fetal growth affecting management of mother in third trimester [O36.5930] 2023 Yes      Problems Resolved During this Admission:          Immunizations     None          This patient has no babies on file.  Pending Diagnostic Studies:     None          Discharged Condition: good    Disposition: Home or Self Care    Follow Up: Monday    Patient Instructions:      Diet Adult Regular     Notify your health care provider if you experience any of the following:  temperature >100.4     Notify your health care provider if you experience any of the following:  persistent nausea and vomiting or diarrhea     Notify your health care provider if you experience any of the following:  severe uncontrolled pain     Notify your health care provider if you experience any of the following:  persistent dizziness, light-headedness, or visual disturbances     Activity as tolerated     Medications:  Current Discharge Medication List      CONTINUE these medications which have NOT CHANGED    Details   aspirin (ECOTRIN) 81 MG EC tablet Take 1 tablet (81 mg total) by  mouth once daily.  Qty: 90 tablet, Refills: 3    Associated Diagnoses: Supervision of high risk pregnancy in second trimester      enoxaparin (LOVENOX) 40 mg/0.4 mL Syrg Inject 0.4 mLs (40 mg total) into the skin once daily.  Qty: 12 mL, Refills: 6    Associated Diagnoses: Acute deep vein thrombosis (DVT) of proximal vein of right lower extremity      food supplemt, lactose-reduced (BOOST HIGH PROTEIN) 0.06 gram- 1 kcal/mL Liqd Take 1 each by mouth once daily.  Qty: 30 each, Refills: 3    Associated Diagnoses: Low weight gain during pregnancy in third trimester      hydrocortisone 2.5 % cream Apply topically 2 (two) times daily.  Qty: 20 g, Refills: 1    Associated Diagnoses: Hemorrhoids during pregnancy in third trimester      ondansetron (ZOFRAN-ODT) 4 MG TbDL Take 1 tablet (4 mg total) by mouth every 12 (twelve) hours as needed (nausea).  Qty: 30 tablet, Refills: 3    Associated Diagnoses: Nausea and vomiting in pregnancy      prenatal 25/iron fum/folic/dha (PRENATAL-1 ORAL) Take by mouth.      promethazine (PHENERGAN) 25 MG tablet Take 1 tablet (25 mg total) by mouth every 4 (four) hours as needed for Nausea.  Qty: 30 tablet, Refills: 3    Associated Diagnoses: Nausea and vomiting in pregnancy      QUEtiapine (SEROQUEL) 50 MG tablet Take 1 tablet (50 mg total) by mouth nightly.  Qty: 30 tablet, Refills: 11    Associated Diagnoses: Anxiety             Marita Mejia, ANAND  Obstetrics  [facility] - [facility] Delivery

## 2023-05-12 NOTE — DISCHARGE INSTRUCTIONS

## 2023-05-12 NOTE — PROCEDURES
Norma De La Garza is a 32 y.o. female patient.    Pulse: 81 (23 1421)  Resp: 18 (23 1320)  BP: 114/73 (23 1335)  SpO2: 97 % (23 1421)       Obtain Fetal nonstress test (NST)    Date/Time: 2023 3:01 PM  Performed by: Marita Mejia CNM  Authorized by: Marita Mejia CNM     Nonstress Test:     Variability:  6-25 BPM    Decelerations:  None    Accelerations:  15 bpm    Acoustic Stimulator: No      Uterine Irritability: No      Contractions:  Not present  Biophysical Profile:     Fetal Breathin    Fetal Movement:  2    Fetal Tone:  2    Fluid Volume:  2    Nonstress Test:  2    Biophysical Profile Score  (of 8):  6    Biophysical Profile Score  (of 10):  8    Nonstress Test Interpretation: reactive      Overall Impression:  Reassuring  Post-procedure:     Patient tolerance:  Patient tolerated the procedure well with no immediate complications    2023

## 2023-05-12 NOTE — HOSPITAL COURSE
EFM / NST reactive reassuring.  Consult with Dr Huggins R/T BPP 8 of 8, dopplers >99% with diastolic flow noted and reactive NST and FHT tracing for 2 hours.  OK to D/C to home with information on [facility]'s BID.  Has F/U appt with MFM and ultrasound on Monday advised to keep.

## 2023-05-15 ENCOUNTER — HOSPITAL ENCOUNTER (OUTPATIENT)
Facility: HOSPITAL | Age: 33
LOS: 1 days | Discharge: OTHER INSTITUTION | End: 2023-05-15
Attending: OBSTETRICS & GYNECOLOGY | Admitting: OBSTETRICS & GYNECOLOGY
Payer: MEDICAID

## 2023-05-15 ENCOUNTER — PROCEDURE VISIT (OUTPATIENT)
Dept: OBSTETRICS AND GYNECOLOGY | Facility: CLINIC | Age: 33
End: 2023-05-15
Payer: MEDICAID

## 2023-05-15 VITALS
SYSTOLIC BLOOD PRESSURE: 125 MMHG | HEART RATE: 75 BPM | BODY MASS INDEX: 19.72 KG/M2 | WEIGHT: 115.5 LBS | HEIGHT: 64 IN | DIASTOLIC BLOOD PRESSURE: 73 MMHG

## 2023-05-15 DIAGNOSIS — Z86.718 PREVIOUS DEEP VEIN THROMBOSIS (DVT) AFFECTING PREGNANCY IN THIRD TRIMESTER: ICD-10-CM

## 2023-05-15 DIAGNOSIS — O36.5990 FETAL GROWTH RESTRICTION ANTEPARTUM: Primary | ICD-10-CM

## 2023-05-15 DIAGNOSIS — B18.2 CHRONIC HEPATITIS C WITHOUT HEPATIC COMA: ICD-10-CM

## 2023-05-15 DIAGNOSIS — Z86.79 HISTORY OF ENDOCARDITIS: ICD-10-CM

## 2023-05-15 DIAGNOSIS — O09.893 PREVIOUS DEEP VEIN THROMBOSIS (DVT) AFFECTING PREGNANCY IN THIRD TRIMESTER: ICD-10-CM

## 2023-05-15 DIAGNOSIS — N96 HABITUAL ABORTER: ICD-10-CM

## 2023-05-15 DIAGNOSIS — Z36.89 ENCOUNTER FOR ULTRASOUND TO ASSESS FETAL GROWTH: ICD-10-CM

## 2023-05-15 DIAGNOSIS — O36.5930 POOR FETAL GROWTH AFFECTING MANAGEMENT OF MOTHER IN THIRD TRIMESTER: ICD-10-CM

## 2023-05-15 DIAGNOSIS — O28.3 ABNORMAL FETAL ULTRASOUND: ICD-10-CM

## 2023-05-15 DIAGNOSIS — O99.330 TOBACCO USE IN PREGNANCY, ANTEPARTUM: ICD-10-CM

## 2023-05-15 DIAGNOSIS — O36.1990 MATERNAL RED CELL ALLOIMMUNIZATION, ANTEPARTUM, SINGLE OR UNSPECIFIED FETUS: ICD-10-CM

## 2023-05-15 PROCEDURE — 76820 PR US, OB DOPPLER, FETAL UMBILICAL ARTERY ECHO: ICD-10-PCS | Mod: 26,S$PBB,, | Performed by: OBSTETRICS & GYNECOLOGY

## 2023-05-15 PROCEDURE — 76820 UMBILICAL ARTERY ECHO: CPT | Mod: PBBFAC,PO | Performed by: OBSTETRICS & GYNECOLOGY

## 2023-05-15 PROCEDURE — 99215 PR OFFICE/OUTPT VISIT, EST, LEVL V, 40-54 MIN: ICD-10-PCS | Mod: S$PBB,TH,, | Performed by: OBSTETRICS & GYNECOLOGY

## 2023-05-15 PROCEDURE — 99215 OFFICE O/P EST HI 40 MIN: CPT | Mod: S$PBB,TH,, | Performed by: OBSTETRICS & GYNECOLOGY

## 2023-05-15 PROCEDURE — 76820 UMBILICAL ARTERY ECHO: CPT | Mod: 26,S$PBB,, | Performed by: OBSTETRICS & GYNECOLOGY

## 2023-05-15 PROCEDURE — 63600175 PHARM REV CODE 636 W HCPCS: Performed by: OBSTETRICS & GYNECOLOGY

## 2023-05-15 PROCEDURE — 76816 OB US FOLLOW-UP PER FETUS: CPT | Mod: PBBFAC,PO | Performed by: OBSTETRICS & GYNECOLOGY

## 2023-05-15 PROCEDURE — 76816 OB US FOLLOW-UP PER FETUS: CPT | Mod: 26,S$PBB,, | Performed by: OBSTETRICS & GYNECOLOGY

## 2023-05-15 PROCEDURE — 76816 PR  US,PREGNANT UTERUS,F/U,TRANSABD APP: ICD-10-PCS | Mod: 26,S$PBB,, | Performed by: OBSTETRICS & GYNECOLOGY

## 2023-05-15 RX ORDER — BETAMETHASONE SODIUM PHOSPHATE AND BETAMETHASONE ACETATE 3; 3 MG/ML; MG/ML
12 INJECTION, SUSPENSION INTRA-ARTICULAR; INTRALESIONAL; INTRAMUSCULAR; SOFT TISSUE EVERY 24 HOURS
Status: DISCONTINUED | OUTPATIENT
Start: 2023-05-15 | End: 2023-05-15 | Stop reason: HOSPADM

## 2023-05-15 RX ADMIN — BETAMETHASONE SODIUM PHOSPHATE AND BETAMETHASONE ACETATE 12 MG: 3; 3 INJECTION, SUSPENSION INTRA-ARTICULAR; INTRALESIONAL; INTRAMUSCULAR at 03:05

## 2023-05-15 NOTE — ASSESSMENT & PLAN NOTE
U/S today shows EFW 1214 grams (<1st percentile) and absent end diastolic flow.  SANTOSH 4.0 cm.    Per [facility], betamethasone series is recommended, followed by  delivery on Wednesday of this week.  Due to full NICU here this week, patient requests transfer to [facility] for her delivery.  [facility] by Dr. Clayton [facility].

## 2023-05-15 NOTE — ASSESSMENT & PLAN NOTE
Anti-FyA and Anti-E--last titers 1:4; fetus is antigen negative so no risk for hemolytic disease.

## 2023-05-15 NOTE — H&P
[facility] - Labor & Delivery  Obstetrics  History & Physical    Patient Name: Norma De La Garza  MRN: [id number]  Admission Date: 5/15/2023  Primary Care Provider: Ron Mckinnon MD    Subjective:     Principal Problem:Poor fetal growth affecting management of mother in third trimester    History of Present Illness:  32 y.o. female  at 35w3d EGA, referred to L&D by MFM due to fetal growth restriction with absent end diastolic flow and oligohydramnios noted on u/s today with recommendation for betamethasone series followed by delivery in 2 days.  EFW 1214 grams on u/s today.  SANTOSH 4 cm.   Patient has no complaints.  Normal fetal movement.  No vaginal bleeding or fluid leakage.  No cramping or contractions.  Patient requests transfer to hospital with NICU beds available prior to her delivery.  Previous  x 2 and plans repeat .  Pregnancy also complicated by previous DVT, history of endocarditis, and history of hepatitis C, with normal LFT's during her pregnancy.        OB History    Para Term  AB Living   10 2 0 2 7 3   SAB IAB Ectopic Multiple Live Births   7 0 0 1 3      # Outcome Date GA Lbr Huseyin/2nd Weight Sex Delivery Anes PTL Lv   10 Current            9 2022           8 2022           7 SAB 21 5w0d          6  16 32w0d   F CS-Unspec EPI  ELTON   5 2012           4 2011 3w0d          3 SAB 06/15/10 17w0d   F  EPI  FD      Complications: Rh incompatibility   2A  09 32w0d   F CS-Unspec OTHER, EPI  ELTON   2B  09 32w0d   M CS-Unspec EPI N ELTON   1 SAB              Past Medical History:   Diagnosis Date    Abnormal Pap smear of cervix     Deep vein thrombosis     Encounter for blood transfusion     Hepatitis B carrier     Hepatitis C      Past Surgical History:   Procedure Laterality Date     SECTION      CONIZATION OF CERVIX USING LOOP ELECTROSURGICAL EXCISION PROCEDURE (LEEP) N/A 2022    Procedure: LEEP  CONIZATION, CERVIX;  Surgeon: DESTINY Mariee MD;  [facility];  Service: OB/GYN;  Laterality: N/A;    DILATION AND CURETTAGE OF UTERUS      jaw reconstruction  2008    TONSILLECTOMY         PTA Medications   Medication Sig    aspirin (ECOTRIN) 81 MG EC tablet Take 1 tablet (81 mg total) by mouth once daily.    enoxaparin (LOVENOX) 40 mg/0.4 mL Syrg Inject 0.4 mLs (40 mg total) into the skin once daily.    food supplemt, lactose-reduced (BOOST HIGH PROTEIN) 0.06 gram- 1 kcal/mL Liqd Take 1 each by mouth once daily.    hydrocortisone 2.5 % cream Apply topically 2 (two) times daily.    ondansetron (ZOFRAN-ODT) 4 MG TbDL Take 1 tablet (4 mg total) by mouth every 12 (twelve) hours as needed (nausea).    prenatal 25/iron fum/folic/dha (PRENATAL-1 ORAL) Take by mouth.    promethazine (PHENERGAN) 25 MG tablet Take 1 tablet (25 mg total) by mouth every 4 (four) hours as needed for Nausea.    QUEtiapine (SEROQUEL) 50 MG tablet Take 1 tablet (50 mg total) by mouth nightly.       Review of patient's allergies indicates:   Allergen Reactions    Penicillins     Meperidine Itching        Family History    None       Tobacco Use    Smoking status: Every Day     Packs/day: 1.00     Types: Cigarettes    Smokeless tobacco: Current   Substance and Sexual Activity    Alcohol use: No    Drug use: Not Currently     Comment: Heroine    Sexual activity: Yes     Partners: Male     Birth control/protection: None     Review of Systems   Constitutional:  Negative for chills and fever.   Gastrointestinal:  Negative for abdominal pain.   Genitourinary:  Negative for vaginal bleeding.    Objective:     Vital Signs (Most Recent):    Vital Signs (24h Range):  Pulse:  [75] 75  BP: (125)/(73) 125/73        There is no height or weight on file to calculate BMI.    FHT: 140 Cat 1 (reassuring)  TOCO:  Q 0 minutes     Physical Exam:   Constitutional: She is oriented to person, place, and time. She appears well-developed and  well-nourished. No distress.    HENT:   Head: Normocephalic and atraumatic.      Cardiovascular:  Normal rate and regular rhythm.             Pulmonary/Chest: Effort normal.        Abdominal: Soft. There is no abdominal tenderness.             Musculoskeletal: No tenderness or edema.       Neurological: She is alert and oriented to person, place, and time.    Skin: Skin is warm and dry.    Psychiatric: She has a normal mood and affect.           Significant Labs:  Lab Results   Component Value Date    GROUPTRH A POS 2023    HEPBSAG Non-reactive 2022           Assessment/Plan:     32 y.o. female  at 35w5d for:    * Poor fetal growth affecting management of mother in third trimester  U/S today shows EFW 1214 grams (<1st percentile) and absent end diastolic flow.  SANTOSH 4.0 cm.    Per MFM, betamethasone series is recommended, followed by  delivery on Wednesday of this week.  Due to full NICU here this week, patient requests transfer to [facility] for her delivery.  [facility] by Dr[facility] Clayton [facility].    History of  delivery, currently pregnant  Plan repeat     History of endocarditis  Patient has had full cardiology workup during this pregnancy.  SBE prophylaxis recommended at  section with Ancef.    Previous deep vein thrombosis (DVT) affecting pregnancy in third trimester  Continue Lovenox 40 mg daily for now.  Will hold Lovenox for 12 hours prior to scheduled .    Hepatitis C virus infection without hepatic coma  Stable LFT's.  Patient will follow up postpartum for this.        Isis Yuan MD  Obstetrics  [facility] - Labor & Delivery

## 2023-05-15 NOTE — HPI
32 y.o. female  at 35w3d EGA, referred to L&D by MFM due to fetal growth restriction with absent end diastolic flow and oligohydramnios noted on u/s today with recommendation for betamethasone series followed by delivery in 2 days.  EFW 1214 grams on u/s today.  SANTOSH 4 cm.   Patient has no complaints.  Normal fetal movement.  No vaginal bleeding or fluid leakage.  No cramping or contractions.  Patient requests transfer to hospital with NICU beds available prior to her delivery.  Previous  x 2 and plans repeat .  Pregnancy also complicated by previous DVT, history of endocarditis, and history of hepatitis C, with normal LFT's during her pregnancy.

## 2023-05-15 NOTE — SUBJECTIVE & OBJECTIVE
OB History    Para Term  AB Living   10 2 0 2 7 3   SAB IAB Ectopic Multiple Live Births   7 0 0 1 3      # Outcome Date GA Lbr Huseyin/2nd Weight Sex Delivery Anes PTL Lv   10 Current            9 2022           8 2022           7 SAB 21 5w0d          6  16 32w0d   F CS-Unspec EPI  ELTON   5 2012           4 2011 3w0d          3 SAB 06/15/10 17w0d   F  EPI  FD      Complications: Rh incompatibility   2A  09 32w0d   F CS-Unspec OTHER, EPI  ELTON   2B  09 32w0d   M CS-Unspec EPI N ELTON   1 SAB              Past Medical History:   Diagnosis Date    Abnormal Pap smear of cervix     Deep vein thrombosis     Encounter for blood transfusion     Hepatitis B carrier     Hepatitis C      Past Surgical History:   Procedure Laterality Date     SECTION      CONIZATION OF CERVIX USING LOOP ELECTROSURGICAL EXCISION PROCEDURE (LEEP) N/A 2022    Procedure: LEEP CONIZATION, CERVIX;  Surgeon: DESTINY Mariee MD;  Location: [facility];  Service: OB/GYN;  Laterality: N/A;    DILATION AND CURETTAGE OF UTERUS      jaw reconstruction      TONSILLECTOMY         PTA Medications   Medication Sig    aspirin (ECOTRIN) 81 MG EC tablet Take 1 tablet (81 mg total) by mouth once daily.    enoxaparin (LOVENOX) 40 mg/0.4 mL Syrg Inject 0.4 mLs (40 mg total) into the skin once daily.    food supplemt, lactose-reduced (BOOST HIGH PROTEIN) 0.06 gram- 1 kcal/mL Liqd Take 1 each by mouth once daily.    hydrocortisone 2.5 % cream Apply topically 2 (two) times daily.    ondansetron (ZOFRAN-ODT) 4 MG TbDL Take 1 tablet (4 mg total) by mouth every 12 (twelve) hours as needed (nausea).    prenatal 25/iron fum/folic/dha (PRENATAL-1 ORAL) Take by mouth.    promethazine (PHENERGAN) 25 MG tablet Take 1 tablet (25 mg total) by mouth every 4 (four) hours as needed for Nausea.    QUEtiapine (SEROQUEL) 50 MG tablet Take 1 tablet (50 mg total) by mouth nightly.       Review of  patient's allergies indicates:   Allergen Reactions    Penicillins     Meperidine Itching        Family History    None       Tobacco Use    Smoking status: Every Day     Packs/day: 1.00     Types: Cigarettes    Smokeless tobacco: Current   Substance and Sexual Activity    Alcohol use: No    Drug use: Not Currently     Comment: Heroine    Sexual activity: Yes     Partners: Male     Birth control/protection: None     Review of Systems   Constitutional:  Negative for chills and fever.   Gastrointestinal:  Negative for abdominal pain.   Genitourinary:  Negative for vaginal bleeding.    Objective:     Vital Signs (Most Recent):    Vital Signs (24h Range):  Pulse:  [75] 75  BP: (125)/(73) 125/73        There is no height or weight on file to calculate BMI.    FHT: 140 Cat 1 (reassuring)  TOCO:  Q 0 minutes     Physical Exam:   Constitutional: She is oriented to person, place, and time. She appears well-developed and well-nourished. No distress.    HENT:   Head: Normocephalic and atraumatic.      Cardiovascular:  Normal rate and regular rhythm.             Pulmonary/Chest: Effort normal.        Abdominal: Soft. There is no abdominal tenderness.             Musculoskeletal: No tenderness or edema.       Neurological: She is alert and oriented to person, place, and time.    Skin: Skin is warm and dry.    Psychiatric: She has a normal mood and affect.           Significant Labs:  Lab Results   Component Value Date    [facility]    HEPBSAG Non-reactive 12/05/2022

## 2023-05-15 NOTE — DISCHARGE SUMMARY
[facility] - Labor & Delivery  Obstetrics  Discharge Summary      Patient Name: Norma De La Garza  MRN: [id number]  Admission Date: 5/15/2023  Hospital Length of Stay: 1 days  Discharge Date and Time:  05/15/2023 5:06 PM  Attending Physician: Isis Yuan, *   Discharging Provider: Isis Yuan MD   Primary Care Provider: Ron Mckinnon MD    HPI: 32 y.o. female  at 35w3d EGA, referred to L&D by [facility] due to fetal growth restriction with absent end diastolic flow and oligohydramnios noted on u/s today with recommendation for betamethasone series followed by delivery in 2 days.  EFW 1214 grams on u/s today.  SANTOSH 4 cm.   Patient has no complaints.  Normal fetal movement.  No vaginal bleeding or fluid leakage.  No cramping or contractions.  Patient requests transfer to hospital with NICU beds available prior to her delivery.  Previous  x 2 and plans repeat .  Pregnancy also complicated by previous DVT, history of endocarditis, and history of hepatitis C, with normal LFT's during her pregnancy.    Hospital Course:   Patient referred to L&D by [facility] due to fetal growth restriction with absent end diastolic flow and oligohydramnios on u/s today.    Recommendation made for betamethasone series, followed by repeat  in 2 days.    Due to no NICU beds available here this week, patient requested transfer to [facility] for care.  Patient accepted for direct admit to high risk unit at [facility] by JULI Prajapati at [facility].           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  Pending Diagnostic Studies:     None          Discharged Condition: stable    Disposition:  Transfer to [facility]    Follow Up:  Postpartum appointment    Patient Instructions:   No discharge procedures on file.  Medications:  Current Discharge Medication List      CONTINUE these medications which have NOT CHANGED    Details   aspirin (ECOTRIN) 81 MG EC tablet Take 1 tablet (81 mg total) by mouth once daily.  Qty: 90 tablet, Refills: 3    Associated Diagnoses: Supervision of high risk pregnancy in second trimester      enoxaparin (LOVENOX) 40 mg/0.4 mL Syrg Inject 0.4 mLs (40 mg total) into the skin once daily.  Qty: 12 mL, Refills: 6    Associated Diagnoses: Acute deep vein thrombosis (DVT) of proximal vein of right lower extremity      food supplemt, lactose-reduced (BOOST HIGH PROTEIN) 0.06 gram- 1 kcal/mL Liqd Take 1 each by mouth once daily.  Qty: 30 each, Refills: 3    Associated Diagnoses: Low weight gain during pregnancy in third trimester      hydrocortisone 2.5 % cream Apply topically 2 (two) times daily.  Qty: 20 g, Refills: 1    Associated Diagnoses: Hemorrhoids during pregnancy in third trimester      ondansetron (ZOFRAN-ODT) 4 MG TbDL Take 1 tablet (4 mg total) by mouth every 12 (twelve) hours as needed (nausea).  Qty: 30 tablet, Refills: 3    Associated Diagnoses: Nausea and vomiting in pregnancy      prenatal 25/iron fum/folic/dha (PRENATAL-1 ORAL) Take by mouth.      promethazine (PHENERGAN) 25 MG tablet Take 1 tablet (25 mg total) by mouth every 4 (four) hours as needed for Nausea.  Qty: 30 tablet, Refills: 3    Associated Diagnoses: Nausea and vomiting in pregnancy      QUEtiapine (SEROQUEL) 50 MG tablet Take 1 tablet (50 mg total) by  mouth nightly.  Qty: 30 tablet, Refills: 11    Associated Diagnoses: Anxiety             Isis Yuan MD  Obstetrics  [facility] - Labor & Delivery

## 2023-05-15 NOTE — PROGRESS NOTES
"Maternal Fetal Medicine follow up consult    SUBJECTIVE:     Norma De La Garza is a 32 y.o.  female with IUP at 35w5d who is seen in follow up consultation by MFM.  Pregnancy complications include:   Problem   Abnormal Fetal Ultrasound   Maternal Red Cell Alloimmunization, Antepartum   History of Endocarditis   Habitual Aborter   Previous Deep Vein Thrombosis (Dvt) Affecting Pregnancy in Third Trimester   Hepatitis C Virus Infection Without Hepatic Coma   Tobacco Use in Pregnancy, Antepartum (Resolved)       Previous notes reviewed.   No changes to medical, surgical, family, social, or obstetric history.    Interval history since last MFM visit: has been seen at hospital a few times over the last couple of weeks due to equivocal BPP--NST has been reassuring.     Medications:  Current Outpatient Medications   Medication Instructions    aspirin (ECOTRIN) 81 mg, Oral, Daily    enoxaparin (LOVENOX) 40 mg, Subcutaneous, Daily    food supplemt, lactose-reduced (BOOST HIGH PROTEIN) 0.06 gram- 1 kcal/mL Liqd 1 each, Oral, Daily    hydrocortisone 2.5 % cream Topical (Top), 2 times daily    ondansetron (ZOFRAN-ODT) 4 mg, Oral, Every 12 hours PRN    prenatal 25/iron fum/folic/dha (PRENATAL-1 ORAL) Oral    promethazine (PHENERGAN) 25 mg, Oral, Every 4 hours PRN    QUEtiapine (SEROQUEL) 50 mg, Oral, Nightly            OBJECTIVE:   /73   Pulse 75   Ht 5' 4" (1.626 m)   Wt 52.4 kg (115 lb 8.3 oz)   LMP 09/15/2022   BMI 19.83 kg/m²       Ultrasound performed. See viewpoint for full ultrasound report.  FGR is identified on today's study. The EFW plots at the <1%, and the AC plots at the <1%.   The EFW is 1214 g.  The amniotic fluid volume is decreased and consistent with oligohydramnios.   Umbilical artery Doppler interrogation demonstrates absent end diastolic flow.        ASSESSMENT/PLAN:     32 y.o.  female with IUP at 35w5d    Abnormal fetal ultrasound  Please see prior notes for extensive " counseling.  Ultrasound findings have been consistent with early onset FGR with micromelia with approximately 4-5 week lag in biometry for long bones. TORCH titers were consistent with a likely prior history of CMV (IgG positive, IgM neg). Fetal echo was normal. Amniocentesis normal CMA; skeletal dysplasia panel negative for causative variants; an single copy VOUS in ECV seen.     Ultrasound today demonstrates worsening growth restriction with greater lag in all parameters and now with oligohydramnios and Doppler abnormality. Normal mineralization and no evidence of fracture or curvature.    Recommendations:  1. Given the deceleration in fetal growth, the Doppler abnormalities and oligohydramnios, recommend delivery at this GA. Discussed with Moose Hendrix who is CNM covering today and instructed patient to go to the hospital. Reasonable to give steroids and keep on CFM while awaiting steroid window, but if tracing concerning would move to delivery sooner.    Habitual aborter  Anticardiolipin + IgM in January--needs repeat testing.   Cont lovenox, aspirin.  Recommend repeat anticardiolipin lab draw > 12 weeks from initial draw, but given her history and presentation would continue the Lovenox and aspirin through this pregnancy.  Can repeat anticardiolipin in post-partum period or with repeat HIV/Hep B testing in 3rd trimester.    Hepatitis C virus infection without hepatic coma  Viral load: 5.38 log in January  LFTS normal in January    See prior notes for more extensive counseling  Recommendations:  Repeat screen for HIV infection, hepatitis A and hepatitis B infection, and other sexually transmitted diseases (syphilis, gonorrhea, and chlamydia) in 3rd trimester  Vaccinate for hepatitis A and hepatitis B if non-immune  Given normal LFTs and fact that LFTs decrease in pregnancy, no need for further repeat of LFTs.   Refer to hepatology for evaluation and consideration of treatment postpartum  Mode of delivery per  other obstetric indications.  Avoid invasive fetal procedures intrapartum (early amniotomy, fetal scalp electrode, and episiotomy) unless necessary.  Breastfeeding is not contraindicated (recommend abstain from breastfeeding if nipples are bleeding or cracked).  Evaluation of  by pediatricians after delivery.    Maternal red cell alloimmunization, antepartum  Anti-FyA and Anti-E--last titers 1:4; fetus is antigen negative so no risk for hemolytic disease.        Previous deep vein thrombosis (DVT) affecting pregnancy in third trimester  No symptoms. Has been compliant with LMWH  Please refer to initial consult for full details  Please obtain prothrombin [id number] mutation and Factor V Leiden mutation analysis  Continue lovenox 40 mg daily and aspirin 81 mg daily.    Peripartum Management  MAUDE hose/SCDs should be used while anticoagulation is interrupted.  The last dose of LMWH should be 12 hours prior to neuraxial anesthesia.  In patients with neuraxial anesthesia: prophylactic anticoagulation should not be initiated within 12 hours of neuraxial blockade or within 4 hours of epidural removal, whichever is later; therapeutic anticoagulation with LMWH should not be initiated within 24 hours of neuraxial blockade or within 4 hours of epidural removal, whichever is later.     Prophylactic anticoagulation (defer to timing related to neuraxial anesthesia)  After vaginal delivery: should be started no sooner than 6 hours   After  delivery: should be started no sooner than 12 hours      History of endocarditis  Please see prior notes for counseling.  Maternal echo 2023:  The left ventricle is normal in size with concentric remodeling and normal systolic function.  The estimated ejection fraction is 60%.  Normal left ventricular diastolic function.  Normal right ventricular size with mildly to moderately reduced right ventricular systolic function.  There is moderate prolapse of the anterior tricuspid  leaflet.  Moderate to severe tricuspid regurgitation.  Normal central venous pressure (3 mmHg).  The estimated PA systolic pressure is 27 mmHg.  Mild mitral regurgitation.    Saw Dr. Nieves who recommended  -SBE antibiotic prophylaxis for invasive procedures including delivery per AHA guidelines  Denies any cardiac symptoms or complaints currently.      Tobacco use in pregnancy, antepartum  Smoking 1 ppd. Encourage attempts at cessation    Today I have spent 48 minutes in the care of the patient. This includes face to face time and non-face to face time preparing to see the patient (eg, review of tests), obtaining and/or reviewing separately obtained history, documenting clinical information in the electronic or other health record, independently interpreting results and communicating results to the patient/family/caregiver, or care coordination.     Bernard Orona Jr., MD  Maternal Fetal Medicine

## 2023-05-15 NOTE — ASSESSMENT & PLAN NOTE
Anticardiolipin + IgM in January--needs repeat testing.   Cont lovenox, aspirin.  Recommend repeat anticardiolipin lab draw > 12 weeks from initial draw, but given her history and presentation would continue the Lovenox and aspirin through this pregnancy.  Can repeat anticardiolipin in post-partum period or with repeat HIV/Hep B testing in 3rd trimester.

## 2023-05-15 NOTE — HOSPITAL COURSE
Patient referred to L&D by JULI due to fetal growth restriction with absent end diastolic flow and oligohydramnios on u/s today.    Recommendation made for betamethasone series, followed by repeat  in 2 days.    Due to no NICU beds available here this week, patient requested transfer to [facility] for care.  Patient accepted for direct admit to high risk unit at [facility] by JULI Prajapati at [facility].

## 2023-05-15 NOTE — ASSESSMENT & PLAN NOTE
No symptoms. Has been compliant with LMWH  Please refer to initial consult for full details  Please obtain prothrombin [id number] mutation and Factor V Leiden mutation analysis  Continue lovenox 40 mg daily and aspirin 81 mg daily.    Peripartum Management  MAUDE hose/SCDs should be used while anticoagulation is interrupted.  The last dose of LMWH should be 12 hours prior to neuraxial anesthesia.  In patients with neuraxial anesthesia: prophylactic anticoagulation should not be initiated within 12 hours of neuraxial blockade or within 4 hours of epidural removal, whichever is later; therapeutic anticoagulation with LMWH should not be initiated within 24 hours of neuraxial blockade or within 4 hours of epidural removal, whichever is later.     Prophylactic anticoagulation (defer to timing related to neuraxial anesthesia)  After vaginal delivery: should be started no sooner than 6 hours   After  delivery: should be started no sooner than 12 hours

## 2023-05-15 NOTE — ASSESSMENT & PLAN NOTE
Please see prior notes for counseling.  Maternal echo 2/2023:   The left ventricle is normal in size with concentric remodeling and normal systolic function.   The estimated ejection fraction is 60%.   Normal left ventricular diastolic function.   Normal right ventricular size with mildly to moderately reduced right ventricular systolic function.   There is moderate prolapse of the anterior tricuspid leaflet.   Moderate to severe tricuspid regurgitation.   Normal central venous pressure (3 mmHg).   The estimated PA systolic pressure is 27 mmHg.   Mild mitral regurgitation.    Saw Dr. Nieves who recommended  -SBE antibiotic prophylaxis for invasive procedures including delivery per AHA guidelines  Denies any cardiac symptoms or complaints currently.

## 2023-05-15 NOTE — ASSESSMENT & PLAN NOTE
Patient has had full cardiology workup during this pregnancy.  SBE prophylaxis recommended at  section with Ancef.

## 2023-05-15 NOTE — ASSESSMENT & PLAN NOTE
Please see prior notes for extensive counseling.  Ultrasound findings have been consistent with early onset FGR with micromelia with approximately 4-5 week lag in biometry for long bones. TORCH titers were consistent with a likely prior history of CMV (IgG positive, IgM neg). Fetal echo was normal. Amniocentesis normal CMA; skeletal dysplasia panel negative for causative variants; an single copy VOUS in ECV seen.     Ultrasound today demonstrates worsening growth restriction with greater lag in all parameters and now with oligohydramnios and Doppler abnormality. Normal mineralization and no evidence of fracture or curvature.    Recommendations:  1. Given the deceleration in fetal growth, the Doppler abnormalities and oligohydramnios, recommend delivery at this GA. Discussed with Moose Hendrix who is CNM covering today and instructed patient to go to the hospital. Reasonable to give steroids and keep on CFM while awaiting steroid window, but if tracing concerning would move to delivery sooner.

## 2023-05-16 ENCOUNTER — TELEPHONE (OUTPATIENT)
Dept: PREADMISSION TESTING | Facility: HOSPITAL | Age: 33
End: 2023-05-16
Payer: MEDICAID

## 2023-05-19 LAB
MISCELLANEOUS TEST NAME: NORMAL
REFERENCE LAB: NORMAL
SPECIMEN TYPE: NORMAL
TEST RESULT: NORMAL

## 2023-05-23 ENCOUNTER — PROCEDURE VISIT (OUTPATIENT)
Dept: OBSTETRICS AND GYNECOLOGY | Facility: CLINIC | Age: 33
End: 2023-05-23
Payer: MEDICAID

## 2023-05-23 ENCOUNTER — POSTPARTUM VISIT (OUTPATIENT)
Dept: OBSTETRICS AND GYNECOLOGY | Facility: CLINIC | Age: 33
End: 2023-05-23
Payer: MEDICAID

## 2023-05-23 VITALS
WEIGHT: 106.5 LBS | HEIGHT: 64 IN | DIASTOLIC BLOOD PRESSURE: 72 MMHG | SYSTOLIC BLOOD PRESSURE: 118 MMHG | BODY MASS INDEX: 18.18 KG/M2

## 2023-05-23 DIAGNOSIS — O36.5930 POOR FETAL GROWTH AFFECTING MANAGEMENT OF MOTHER IN THIRD TRIMESTER, SINGLE OR UNSPECIFIED FETUS: ICD-10-CM

## 2023-05-23 PROCEDURE — 59430 PR CARE AFTER DELIVERY ONLY: ICD-10-PCS | Mod: ,,, | Performed by: ADVANCED PRACTICE MIDWIFE

## 2023-05-23 PROCEDURE — 99213 OFFICE O/P EST LOW 20 MIN: CPT | Mod: PBBFAC | Performed by: ADVANCED PRACTICE MIDWIFE

## 2023-05-23 PROCEDURE — 99999 PR PBB SHADOW E&M-EST. PATIENT-LVL III: CPT | Mod: PBBFAC,,, | Performed by: ADVANCED PRACTICE MIDWIFE

## 2023-05-23 PROCEDURE — 99999 PR PBB SHADOW E&M-EST. PATIENT-LVL III: ICD-10-PCS | Mod: PBBFAC,,, | Performed by: ADVANCED PRACTICE MIDWIFE

## 2023-05-23 RX ORDER — IBUPROFEN 800 MG/1
800 TABLET ORAL 3 TIMES DAILY
COMMUNITY
Start: 2023-05-18 | End: 2024-03-08

## 2023-05-23 RX ORDER — OXYCODONE HYDROCHLORIDE 5 MG/1
TABLET ORAL
COMMUNITY
Start: 2023-05-18 | End: 2024-03-08

## 2023-05-23 NOTE — PROGRESS NOTES
" Norma De La Garza  is here for a 1 week postpartum visit. She had a  at [facility] on 23. Unable to view delivery notes/hospital stay records. She reports that she is not having any bowel or bladder problems. She describes her bleeding as a "normal cycle." But she does still report some significant cramping. She is taking 800mg of Ibprofen as well as percocet as needed.     Past Medical History:   Diagnosis Date    Abnormal Pap smear of cervix     Deep vein thrombosis     Encounter for blood transfusion     Hepatitis B carrier     Hepatitis C      Past Surgical History:   Procedure Laterality Date     SECTION      CONIZATION OF CERVIX USING LOOP ELECTROSURGICAL EXCISION PROCEDURE (LEEP) N/A 2022    Procedure: LEEP CONIZATION, CERVIX;  Surgeon: DESTINY Mariee MD;  Location: [facility];  Service: OB/GYN;  Laterality: N/A;    DILATION AND CURETTAGE OF UTERUS      jaw reconstruction      TONSILLECTOMY       History reviewed. No pertinent family history.  Review of patient's allergies indicates:   Allergen Reactions    Penicillins     Meperidine Itching     Social History     Socioeconomic History    Marital status: Legally    Tobacco Use    Smoking status: Every Day     Packs/day: 1.00     Types: Cigarettes    Smokeless tobacco: Current   Substance and Sexual Activity    Alcohol use: No    Drug use: Not Currently     Comment: Heroine    Sexual activity: Yes     Partners: Male     Birth control/protection: None       ROS:  GENERAL: No fever, chills, fatigability or weight loss.  VULVAR: No pain, no lesions and no itching.  VAGINAL: No relaxation, no itching, no discharge, expected postpartum bleeding.   ABDOMEN: Mild abdominal cramping. Denies nausea. Denies vomiting. No diarrhea. No constipation  BREAST: Denies pain. No lumps. No discharge.  URINARY: No incontinence, no nocturia, no frequency and no dysuria.  CARDIOVASCULAR: No chest pain. No shortness of breath. " No leg cramps.  NEUROLOGICAL: no headaches. No vision changes.      Vitals:    05/23/23 1043   BP: 118/72     GENERAL: healthy, alert, no distress  ABDOMEN: Positive findings: incision appears to be well healing and non erythematous. Some bruising noted above incision that is tender to palpation. No dressing present, patient states that they removed the dressing before discharging her.         Diagnoses and all orders for this visit:    Routine postpartum follow-up        She had a nexplanon placed PP in the hospital.   Discussed no tub soaks, continue with showers only.   Patient would like to start on suboxone, but is currently trying to get into the osmar program at [facility].   Follow up in 3-4 weeks for routine postpartum.

## 2023-07-10 ENCOUNTER — TELEPHONE (OUTPATIENT)
Dept: OBSTETRICS AND GYNECOLOGY | Facility: CLINIC | Age: 33
End: 2023-07-10
Payer: MEDICAID

## 2023-07-10 NOTE — TELEPHONE ENCOUNTER
Pt called and stated that she had her teeth pulled and dentures placed. When she did this her dentist told her to stop taking lovenox and pt would like to know how does she go about getting back on them. Please advise.

## 2023-07-10 NOTE — TELEPHONE ENCOUNTER
----- Message from Allyson Solorio sent at 7/10/2023  7:49 AM CDT -----  Contact: Norma  Patient is calling to speak with the nurse regarding medication. Reports wanting to know if patient is cleared for blood thinners. Please give patient a call back at [phone]

## 2023-07-14 NOTE — TELEPHONE ENCOUNTER
Attempted to contact pt in regards to medicine. Couldn't lvm due to vm being full. Will send [employer] message.         Patient is no longer pregnant and delivered awhile ago.  Needs to address this with her PCP or heme/onc.

## 2023-08-14 PROBLEM — O36.5930 POOR FETAL GROWTH AFFECTING MANAGEMENT OF MOTHER IN THIRD TRIMESTER: Status: RESOLVED | Noted: 2023-01-23 | Resolved: 2023-08-14

## 2023-08-29 ENCOUNTER — TELEPHONE (OUTPATIENT)
Dept: OBSTETRICS AND GYNECOLOGY | Facility: CLINIC | Age: 33
End: 2023-08-29
Payer: MEDICAID

## 2023-08-29 NOTE — TELEPHONE ENCOUNTER
----- Message from Areli Mckenna sent at 8/29/2023  6:43 AM CDT -----  Contact: Norma Green needs  a call back at [phone], regards to getting her missed  appointment reschedule.    Thanks  Td

## 2023-09-15 ENCOUNTER — PATIENT MESSAGE (OUTPATIENT)
Dept: OBSTETRICS AND GYNECOLOGY | Facility: CLINIC | Age: 33
End: 2023-09-15
Payer: MEDICAID

## 2023-10-31 ENCOUNTER — TELEPHONE (OUTPATIENT)
Dept: OBSTETRICS AND GYNECOLOGY | Facility: CLINIC | Age: 33
End: 2023-10-31
Payer: MEDICAID

## 2023-10-31 DIAGNOSIS — B18.2 CHRONIC HEPATITIS C WITHOUT HEPATIC COMA: Primary | ICD-10-CM

## 2023-10-31 NOTE — TELEPHONE ENCOUNTER
Called patient and she is trying to get an appointment for Hep C doctor and needs referral.  Will send message to provider.

## 2023-10-31 NOTE — TELEPHONE ENCOUNTER
----- Message from Maryg Ewing sent at 10/31/2023  2:22 PM CDT -----  Contact: Pt @[phone]  [email or web address]     Patient is calling for Medical Advice regarding:  Reschedule POSTPARTUM/ check on IUD/ referral for HEP C doctor     Would like response via App [employer]t: call back     Comments:   Please call back to advise.

## 2023-11-02 ENCOUNTER — TELEPHONE (OUTPATIENT)
Dept: OBSTETRICS AND GYNECOLOGY | Facility: CLINIC | Age: 33
End: 2023-11-02
Payer: MEDICAID

## 2023-11-02 NOTE — TELEPHONE ENCOUNTER
----- Message from Tank Ferraro sent at 11/2/2023  8:44 AM CDT -----  Contact: Norma  .Type:  Patient Returning Call    Who Called: Norma   Who Left Message for Patient: Nurse  Does the patient know what this is regarding?: no  Would the patient rather a call back or a response via [facility]?  Call   Best Call Back Number: [phone]

## 2023-11-02 NOTE — TELEPHONE ENCOUNTER
Bere Jones, MEGA Emery, Dena HUMMEL, LPN  Caller: Unspecified (2 days ago,  3:00 PM)  Good morning Dena Emery,     Unfortunately, our department does not accept new Medicaid patients. When someone needs an appointment as a new Medicaid patient, we refer them out to [facility]. Dr. Jayleen Sharpe is there as well. I apologize for any inconvenience this may cause.     ThanksBere Dr. placed referral to hepatology.

## 2023-11-02 NOTE — TELEPHONE ENCOUNTER
Attempted to contact patient in regards to Patient Call Back message. Last encounter from 10/31/2023 states patient is new GYN Medicaid. Patient is established with us as a GYN Patient and OB patient and can be scheduled for GYN visit if needed. No answer, left callback message.

## 2023-11-15 ENCOUNTER — TELEPHONE (OUTPATIENT)
Dept: OBSTETRICS AND GYNECOLOGY | Facility: CLINIC | Age: 33
End: 2023-11-15
Payer: MEDICAID

## 2023-11-15 NOTE — TELEPHONE ENCOUNTER
----- Message from Pee Andrade sent at 11/15/2023  7:14 AM CST -----  Contact: celia Oden is requesting a call to schedule an appointment. Please call her back at [phone].        Thanks  DD

## 2023-11-15 NOTE — TELEPHONE ENCOUNTER
Called patient and she believed she had an appointment scheduled for the end of this month and there are no appointments scheduled for her.  Appointment scheduled.

## 2023-11-29 ENCOUNTER — OFFICE VISIT (OUTPATIENT)
Dept: OBSTETRICS AND GYNECOLOGY | Facility: CLINIC | Age: 33
End: 2023-11-29
Payer: MEDICAID

## 2023-11-29 VITALS
DIASTOLIC BLOOD PRESSURE: 70 MMHG | WEIGHT: 104.94 LBS | SYSTOLIC BLOOD PRESSURE: 130 MMHG | BODY MASS INDEX: 17.92 KG/M2 | HEIGHT: 64 IN

## 2023-11-29 DIAGNOSIS — B18.2 CHRONIC HEPATITIS C WITHOUT HEPATIC COMA: Primary | ICD-10-CM

## 2023-11-29 DIAGNOSIS — Z11.3 SCREEN FOR STD (SEXUALLY TRANSMITTED DISEASE): ICD-10-CM

## 2023-11-29 PROCEDURE — 99213 OFFICE O/P EST LOW 20 MIN: CPT | Mod: PBBFAC

## 2023-11-29 PROCEDURE — 99212 OFFICE O/P EST SF 10 MIN: CPT | Mod: S$PBB,,,

## 2023-11-29 PROCEDURE — 3075F SYST BP GE 130 - 139MM HG: CPT | Mod: CPTII,,,

## 2023-11-29 PROCEDURE — 3078F DIAST BP <80 MM HG: CPT | Mod: CPTII,,,

## 2023-11-29 PROCEDURE — 1159F PR MEDICATION LIST DOCUMENTED IN MEDICAL RECORD: ICD-10-PCS | Mod: CPTII,,,

## 2023-11-29 PROCEDURE — 3008F BODY MASS INDEX DOCD: CPT | Mod: CPTII,,,

## 2023-11-29 PROCEDURE — 99999 PR PBB SHADOW E&M-EST. PATIENT-LVL III: CPT | Mod: PBBFAC,,,

## 2023-11-29 PROCEDURE — 1159F MED LIST DOCD IN RCRD: CPT | Mod: CPTII,,,

## 2023-11-29 PROCEDURE — 99212 PR OFFICE/OUTPT VISIT, EST, LEVL II, 10-19 MIN: ICD-10-PCS | Mod: S$PBB,,,

## 2023-11-29 PROCEDURE — 3008F PR BODY MASS INDEX (BMI) DOCUMENTED: ICD-10-PCS | Mod: CPTII,,,

## 2023-11-29 PROCEDURE — 3078F PR MOST RECENT DIASTOLIC BLOOD PRESSURE < 80 MM HG: ICD-10-PCS | Mod: CPTII,,,

## 2023-11-29 PROCEDURE — 87491 CHLMYD TRACH DNA AMP PROBE: CPT

## 2023-11-29 PROCEDURE — 99999 PR PBB SHADOW E&M-EST. PATIENT-LVL III: ICD-10-PCS | Mod: PBBFAC,,,

## 2023-11-29 PROCEDURE — 3075F PR MOST RECENT SYSTOLIC BLOOD PRESS GE 130-139MM HG: ICD-10-PCS | Mod: CPTII,,,

## 2023-12-01 LAB
C TRACH DNA SPEC QL NAA+PROBE: NOT DETECTED
N GONORRHOEA DNA SPEC QL NAA+PROBE: NOT DETECTED

## 2023-12-05 NOTE — PROGRESS NOTES
Subjective:       Patient ID: Norma De La Garza is a 33 y.o. female.    Chief Complaint:  STD CHECK      History of Present Illness  HPI  Vaginal Discharge and Irritation  Patient presents for vaginal discharge check. Sexual history reviewed with the patient. STD exposure: denies knowledge of risky exposure.  Previous history of STD:  trichomonas, hepatitis, +HPV16. Current symptoms include none.  Contraception:  Nexplanon implant .    Patient requesting referral to follow up with Hep C, she was instructed to do so after pregnancy.     GYN & OB History  No LMP recorded. Patient has had an implant.   Date of Last Pap: 3/7/2022    OB History    Para Term  AB Living   10 2 0 2 7 3   SAB IAB Ectopic Multiple Live Births   7     1 3      # Outcome Date GA Lbr Huseyin/2nd Weight Sex Delivery Anes PTL Lv   10             9 2022           8 2022           7 SAB / 5w0d          6  16 32w0d   F CS-Unspec EPI  ELTON   5 2012           4 2011 3w0d          3 SAB 06/15/10 17w0d   F  EPI  FD      Complications: Rh incompatibility   2A  09 32w0d   F CS-Unspec OTHER, EPI  ELTON   2B  09 32w0d   M CS-Unspec EPI N ELTON   1 SAB                Review of Systems  Review of Systems   Constitutional:  Negative for appetite change, fatigue and fever.   Gastrointestinal:  Negative for abdominal pain, bloating, constipation, diarrhea, nausea and vomiting.   Genitourinary:  Negative for bladder incontinence, dysmenorrhea, dyspareunia, dysuria, flank pain, frequency, genital sores, menorrhagia, menstrual problem, pelvic pain, urgency, vaginal bleeding, vaginal discharge, vaginal pain, postcoital bleeding, vaginal dryness and vaginal odor.   All other systems reviewed and are negative.          Objective:      Physical Exam:   Constitutional: She is oriented to person, place, and time. She appears well-developed and well-nourished. No distress.    HENT:   Head:  Normocephalic and atraumatic.    Eyes: Pupils are equal, round, and reactive to light. Conjunctivae and EOM are normal.     Cardiovascular:  Normal rate.             Pulmonary/Chest: Effort normal.        Abdominal: Soft. She exhibits no distension. There is no abdominal tenderness. There is no rebound and no guarding. Hernia confirmed negative in the right inguinal area and confirmed negative in the left inguinal area.     Genitourinary:    Inguinal canal, vagina, uterus, right adnexa, left adnexa and rectum normal.   Rectum:      No external hemorrhoid.      Pelvic exam was performed with patient supine.   The external female genitalia was normal.   No external genitalia lesions identified,Genitalia hair distrobution normal .   Labial bartholins normal.There is no rash, tenderness, lesion or injury on the right labia. There is no rash, tenderness, lesion or injury on the left labia. Cervix is normal. Right adnexum displays no mass, no tenderness and no fullness. Left adnexum displays no mass, no tenderness and no fullness. No erythema,  no vaginal discharge, tenderness or bleeding in the vagina.    No foreign body in the vagina.      No signs of injury in the vagina.   Vagina was moist.Cervix exhibits no motion tenderness, no lesion, no friability, no lesion, no tenderness and no polyp. Uerus contour normal  Uterus is not enlarged and not tender. Normal urethral meatus.Urethral Meatus exhibits: urethral lesion and prolapsedUrethra findings: no urethral mass, no tenderness and no urethral scarringBladder findings: no bladder distention and no bladder tenderness          Musculoskeletal: Normal range of motion and moves all extremeties.        Arms:       Lymphadenopathy: No inguinal adenopathy noted on the right or left side.    Neurological: She is alert and oriented to person, place, and time.    Skin: Skin is warm and dry. No rash noted. She is not diaphoretic. No erythema. No pallor.    Psychiatric: She has a  normal mood and affect. Her behavior is normal. Judgment and thought content normal.             Assessment:        1. Chronic hepatitis C without hepatic coma    2. Screen for STD (sexually transmitted disease)               Plan:   Continue annual well woman exam.    Diagnosis and orders this visit:  Chronic hepatitis C without hepatic coma  -     Ambulatory referral/consult to Hepatology; Future; Expected date: 12/12/2023    Screen for STD (sexually transmitted disease)  -     C. trachomatis/N. gonorrhoeae by AMP DNA      Kacey Wilder, NP

## 2024-01-01 PROBLEM — O09.299 HISTORY OF INTRAUTERINE GROWTH RESTRICTION IN PRIOR PREGNANCY, CURRENTLY PREGNANT: Status: RESOLVED | Noted: 2023-01-24 | Resolved: 2024-01-01

## 2024-03-01 ENCOUNTER — PATIENT MESSAGE (OUTPATIENT)
Dept: OBSTETRICS AND GYNECOLOGY | Facility: CLINIC | Age: 34
End: 2024-03-01
Payer: MEDICAID

## 2024-03-04 ENCOUNTER — TELEPHONE (OUTPATIENT)
Dept: OBSTETRICS AND GYNECOLOGY | Facility: CLINIC | Age: 34
End: 2024-03-04
Payer: MEDICAID

## 2024-03-04 NOTE — TELEPHONE ENCOUNTER
----- Message from Chantal Carson sent at 3/4/2024  9:01 AM CST -----  Regarding: Patient Returning Call  Contact: Norma  .Type:  Patient Returning Call    Who Called: Norma   Who Left Message for Patient:  Does the patient know what this is regarding?:  Would the patient rather a call back or a response via [facility]? call  Best Call Back Number: [phone]  Additional Information:  Norma returned the missed call but also went to the appt today that was cancelled. She did not receiv  the message.

## 2024-03-04 NOTE — TELEPHONE ENCOUNTER
Called patient and rescheduled appointment.   I have reviewed the H&P which is current within the last 30 days. I have examined the patient, and the changes to the patient's condition are as follows: none. Risks and benefits, as well as alternatives of robotic umbilical hernia repair were discussed with the patient and they would like to proceed.       Date of service: 4/15/2022

## 2024-03-08 ENCOUNTER — OFFICE VISIT (OUTPATIENT)
Dept: OBSTETRICS AND GYNECOLOGY | Facility: CLINIC | Age: 34
End: 2024-03-08
Payer: MEDICAID

## 2024-03-08 VITALS — BODY MASS INDEX: 18.63 KG/M2 | WEIGHT: 109.13 LBS | HEIGHT: 64 IN

## 2024-03-08 DIAGNOSIS — Z97.5 BREAKTHROUGH BLEEDING ON NEXPLANON: Primary | ICD-10-CM

## 2024-03-08 DIAGNOSIS — Z11.3 SCREEN FOR STD (SEXUALLY TRANSMITTED DISEASE): ICD-10-CM

## 2024-03-08 DIAGNOSIS — N92.1 BREAKTHROUGH BLEEDING ON NEXPLANON: Primary | ICD-10-CM

## 2024-03-08 DIAGNOSIS — D06.9 HIGH GRADE SQUAMOUS INTRAEPITHELIAL LESION (HGSIL), GRADE 3 CIN, ON BIOPSY OF CERVIX: ICD-10-CM

## 2024-03-08 PROCEDURE — 99212 OFFICE O/P EST SF 10 MIN: CPT | Mod: S$PBB,,,

## 2024-03-08 PROCEDURE — 3008F BODY MASS INDEX DOCD: CPT | Mod: CPTII,,,

## 2024-03-08 PROCEDURE — 87624 HPV HI-RISK TYP POOLED RSLT: CPT

## 2024-03-08 PROCEDURE — 1160F RVW MEDS BY RX/DR IN RCRD: CPT | Mod: CPTII,,,

## 2024-03-08 PROCEDURE — 88175 CYTOPATH C/V AUTO FLUID REDO: CPT

## 2024-03-08 PROCEDURE — 1159F MED LIST DOCD IN RCRD: CPT | Mod: CPTII,,,

## 2024-03-08 PROCEDURE — 99999 PR PBB SHADOW E&M-EST. PATIENT-LVL II: CPT | Mod: PBBFAC,,,

## 2024-03-08 PROCEDURE — 87491 CHLMYD TRACH DNA AMP PROBE: CPT

## 2024-03-08 PROCEDURE — 99212 OFFICE O/P EST SF 10 MIN: CPT | Mod: PBBFAC

## 2024-03-08 NOTE — PROGRESS NOTES
Subjective:       Patient ID: Norma De La Garza is a 33 y.o. female.    Chief Complaint:  Vaginal Bleeding      History of Present Illness  Vaginal Bleeding        Patient presents with complaints of prolonged bleeding with Nexplanon   Bleeding lasted 20 days last month, it has now resolved   Patient had LEEP procedure May 2022,  which showed severe dysplasia or ERNST 3, she was advised to repeat pap in 6 months but never did, patient states she was not aware it was abnormal.   Complains of vaginal odor and irritation after intercourse     GYN & OB History  No LMP recorded. Patient has had an implant.   Date of Last Pap: 3/8/2024    OB History    Para Term  AB Living   10 2 0 2 7 3   SAB IAB Ectopic Multiple Live Births   7     1 3      # Outcome Date GA Lbr Huseyin/2nd Weight Sex Delivery Anes PTL Lv   10             9 2022           8 2022           7 SAB / 5w0d          6  / 32w0d   F CS-Unspec EPI  ELTON   5 2012           4 2011 3w0d          3 SAB 06/15/10 17w0d   F  EPI  FD      Complications: Rh incompatibility   2A  09 32w0d   F CS-Unspec OTHER, EPI  ELTON   2B  09 32w0d   M CS-Unspec EPI N ELTON   1 SAB                Review of Systems  Review of Systems   Genitourinary:  Positive for vaginal bleeding, vaginal pain and vaginal odor.           Objective:      Physical Exam:   Constitutional: She is oriented to person, place, and time. She appears well-developed and well-nourished. No distress.    HENT:   Head: Normocephalic and atraumatic.    Eyes: Pupils are equal, round, and reactive to light. Conjunctivae and EOM are normal.     Cardiovascular:  Normal rate.             Pulmonary/Chest: Effort normal.        Abdominal: Soft. She exhibits no distension. There is no abdominal tenderness. There is no rebound and no guarding. Hernia confirmed negative in the right inguinal area and confirmed negative in the left inguinal area.      Genitourinary:    Inguinal canal, uterus, right adnexa and left adnexa normal.   Rectum:      No external hemorrhoid.      Pelvic exam was performed with patient supine.   The external female genitalia was normal.   No external genitalia lesions identified,Genitalia hair distrobution normal .     Labial bartholins normal.There is no rash, tenderness, lesion or injury on the right labia. There is no rash, tenderness, lesion or injury on the left labia. Right adnexum displays no mass, no tenderness and no fullness. Left adnexum displays no mass, no tenderness and no fullness. There is erythema, vaginal discharge and tenderness in the vagina. No bleeding in the vagina.    No foreign body in the vagina.      No signs of injury in the vagina.   Cervix exhibits friability and tenderness. Cervix exhibits no motion tenderness, no lesion and no polyp.    pap smear completedUerus contour normal  Uterus is not enlarged and not tender. Uterus size: 8 cm.Normal urethral meatus.Urethral Meatus exhibits: urethral lesionUrethra findings: no urethral mass, no tenderness, no urethral scarring and prolapsedBladder findings: no bladder distention and no bladder tenderness          Musculoskeletal: Normal range of motion and moves all extremeties.      Lymphadenopathy: No inguinal adenopathy noted on the right or left side.    Neurological: She is alert and oriented to person, place, and time.    Skin: Skin is warm and dry. No rash noted. She is not diaphoretic. No erythema. No pallor.    Psychiatric: She has a normal mood and affect. Her behavior is normal. Judgment and thought content normal.             Assessment:        1. Breakthrough bleeding on Nexplanon    2. High grade squamous intraepithelial lesion (HGSIL), grade 3 ERNST, on biopsy of cervix    3. Screen for STD (sexually transmitted disease)               Plan:   Pap collected  If BV or yeast +, will treat   Bleeding as essentially resolved, continue to monitor, notify me  if bleeding occurs again       Diagnosis and orders this visit:  Breakthrough bleeding on Nexplanon  -     C. trachomatis/N. gonorrhoeae by AMP DNA    High grade squamous intraepithelial lesion (HGSIL), grade 3 ERNST, on biopsy of cervix  -     Liquid-Based Pap Smear, Screening  -     HPV High Risk Genotypes, PCR    Screen for STD (sexually transmitted disease)  -     C. trachomatis/N. gonorrhoeae by AMP DNA         Kacey Wilder, NP

## 2024-03-09 LAB
C TRACH DNA SPEC QL NAA+PROBE: NOT DETECTED
N GONORRHOEA DNA SPEC QL NAA+PROBE: NOT DETECTED

## 2024-03-13 ENCOUNTER — TELEPHONE (OUTPATIENT)
Dept: OBSTETRICS AND GYNECOLOGY | Facility: CLINIC | Age: 34
End: 2024-03-13
Payer: MEDICAID

## 2024-03-13 NOTE — TELEPHONE ENCOUNTER
----- Message from Emilee Tam sent at 3/13/2024 10:05 AM CDT -----  Contact: Patient, [phone]  Patient is returning a phone call.  Who left a message for the patient: Ethel  Does patient know what this is regarding:  Results  Would you like a call back, or a response through your [facility] portal?:   Call back  Comments: Missed your call, please call her back. Thanks.

## 2024-03-13 NOTE — TELEPHONE ENCOUNTER
Pt called back for results, informed janee and chl were negative and the results to pap were still processing. Pt verbalized understanding

## 2024-03-13 NOTE — TELEPHONE ENCOUNTER
Rtn call to pt, no answer, no vm Phone call unable to be completed     Beatrice JACOB LPN  OB/GYN

## 2024-03-13 NOTE — TELEPHONE ENCOUNTER
----- Message from Emilee Tam sent at 3/13/2024  8:40 AM CDT -----  Contact: Patient, [phone]  [id number]    Type: Test Results    What test was performed? Labs    Who ordered the test? Kacey Wilder NP    When and where were the test performed? 03/08/2024    Would you like response via [facility]: Call back    Comments: Please call her. Thanks.

## 2024-03-15 LAB
HPV HR 12 DNA SPEC QL NAA+PROBE: NEGATIVE
HPV16 AG SPEC QL: NEGATIVE
HPV18 DNA SPEC QL NAA+PROBE: NEGATIVE

## 2024-03-18 LAB
FINAL PATHOLOGIC DIAGNOSIS: NORMAL
Lab: NORMAL

## 2024-03-19 ENCOUNTER — PATIENT MESSAGE (OUTPATIENT)
Dept: OBSTETRICS AND GYNECOLOGY | Facility: CLINIC | Age: 34
End: 2024-03-19
Payer: MEDICAID

## 2024-03-19 DIAGNOSIS — B96.89 BACTERIAL VAGINITIS: Primary | ICD-10-CM

## 2024-03-19 DIAGNOSIS — N76.0 BACTERIAL VAGINITIS: Primary | ICD-10-CM

## 2024-03-19 RX ORDER — METRONIDAZOLE 500 MG/1
500 TABLET ORAL 2 TIMES DAILY
Qty: 14 TABLET | Refills: 0 | Status: SHIPPED | OUTPATIENT
Start: 2024-03-19 | End: 2024-03-26

## 2024-03-20 ENCOUNTER — TELEPHONE (OUTPATIENT)
Dept: OBSTETRICS AND GYNECOLOGY | Facility: CLINIC | Age: 34
End: 2024-03-20
Payer: MEDICAID

## 2024-03-20 NOTE — TELEPHONE ENCOUNTER
----- Message from Enrique Lai sent at 3/20/2024  7:40 AM CDT -----  Contact: self  ..Type:  Test Results    Who Called: .Norma De La Garza  Name of Test (Lab/Mammo/Etc): pap smear   Date of Test: 03/08  Ordering Provider:  Tina   Where the test was performed: [facility]  Would the patient rather a call back or a response via [facility]? Call back   Best Call Back Number: .[phone] (home)   Additional Information:

## 2024-07-24 ENCOUNTER — PATIENT MESSAGE (OUTPATIENT)
Dept: HEPATOLOGY | Facility: CLINIC | Age: 34
End: 2024-07-24
Payer: MEDICAID

## (undated) DEVICE — SEE MEDLINE ITEM 154981

## (undated) DEVICE — CONTAINER SPECIMEN OR STER 4OZ

## (undated) DEVICE — TOWEL OR DISP STRL BLUE 4/PK

## (undated) DEVICE — SOL NS 1000CC

## (undated) DEVICE — SUT MERSILENE 5-0 RS22

## (undated) DEVICE — JELLY SURGILUBE LUBE PKT 3GM

## (undated) DEVICE — COVER LIGHT HANDLE 80/CA

## (undated) DEVICE — SEE MEDLINE ITEM 157027

## (undated) DEVICE — ELECTRODE LOOP 15X12 DISPOSABL

## (undated) DEVICE — PACK DRAPE PERI/GYN TIBURON

## (undated) DEVICE — SYR 10CC LUER LOCK

## (undated) DEVICE — SEE MEDLINE ITEM 157181

## (undated) DEVICE — ELECTRODE BALL DISP 5MM

## (undated) DEVICE — TUBING MEDI-VAC 20FT .25IN

## (undated) DEVICE — GLOVE SURG BIOGEL LATEX SZ 7.5

## (undated) DEVICE — MANIFOLD 4 PORT

## (undated) DEVICE — GLOVE SURG STRL SZ 6.5